# Patient Record
Sex: MALE | Race: WHITE | Employment: OTHER | URBAN - NONMETROPOLITAN AREA
[De-identification: names, ages, dates, MRNs, and addresses within clinical notes are randomized per-mention and may not be internally consistent; named-entity substitution may affect disease eponyms.]

---

## 2017-01-11 RX ORDER — OXYCODONE HYDROCHLORIDE 30 MG/1
30 TABLET, FILM COATED, EXTENDED RELEASE ORAL EVERY 12 HOURS
Qty: 60 EACH | Refills: 0 | Status: SHIPPED | OUTPATIENT
Start: 2017-01-16 | End: 2017-02-07 | Stop reason: SDUPTHER

## 2017-01-11 RX ORDER — HYDROCODONE BITARTRATE AND ACETAMINOPHEN 10; 325 MG/1; MG/1
1 TABLET ORAL EVERY 6 HOURS PRN
Qty: 120 TABLET | Refills: 0 | Status: SHIPPED | OUTPATIENT
Start: 2017-01-16 | End: 2017-02-07 | Stop reason: SDUPTHER

## 2017-02-07 RX ORDER — HYDROCODONE BITARTRATE AND ACETAMINOPHEN 10; 325 MG/1; MG/1
1 TABLET ORAL EVERY 6 HOURS PRN
Qty: 120 TABLET | Refills: 0 | Status: SHIPPED | OUTPATIENT
Start: 2017-02-07 | End: 2017-03-14 | Stop reason: SDUPTHER

## 2017-02-07 RX ORDER — OXYCODONE HYDROCHLORIDE 30 MG/1
30 TABLET, FILM COATED, EXTENDED RELEASE ORAL EVERY 12 HOURS
Qty: 60 EACH | Refills: 0 | Status: SHIPPED | OUTPATIENT
Start: 2017-02-07 | End: 2017-03-14 | Stop reason: SDUPTHER

## 2017-03-14 ENCOUNTER — OFFICE VISIT (OUTPATIENT)
Dept: PAIN MANAGEMENT | Age: 62
End: 2017-03-14
Payer: MEDICARE

## 2017-03-14 ENCOUNTER — HOSPITAL ENCOUNTER (OUTPATIENT)
Age: 62
Setting detail: SPECIMEN
Discharge: HOME | End: 2017-03-14

## 2017-03-14 VITALS
DIASTOLIC BLOOD PRESSURE: 86 MMHG | WEIGHT: 259.2 LBS | BODY MASS INDEX: 35.11 KG/M2 | HEIGHT: 72 IN | RESPIRATION RATE: 20 BRPM | HEART RATE: 96 BPM | SYSTOLIC BLOOD PRESSURE: 136 MMHG

## 2017-03-14 DIAGNOSIS — Z79.891 ENCOUNTER FOR LONG-TERM OPIATE ANALGESIC USE: ICD-10-CM

## 2017-03-14 DIAGNOSIS — G89.29 CHRONIC CERVICAL PAIN: ICD-10-CM

## 2017-03-14 DIAGNOSIS — M17.12 PRIMARY OSTEOARTHRITIS OF LEFT KNEE: Primary | ICD-10-CM

## 2017-03-14 DIAGNOSIS — Z02.83 ENCOUNTER FOR DRUG SCREENING: ICD-10-CM

## 2017-03-14 DIAGNOSIS — M54.2 CHRONIC CERVICAL PAIN: ICD-10-CM

## 2017-03-14 PROCEDURE — 4004F PT TOBACCO SCREEN RCVD TLK: CPT | Performed by: NURSE PRACTITIONER

## 2017-03-14 PROCEDURE — 3017F COLORECTAL CA SCREEN DOC REV: CPT | Performed by: NURSE PRACTITIONER

## 2017-03-14 PROCEDURE — G8484 FLU IMMUNIZE NO ADMIN: HCPCS | Performed by: NURSE PRACTITIONER

## 2017-03-14 PROCEDURE — G8427 DOCREV CUR MEDS BY ELIG CLIN: HCPCS | Performed by: NURSE PRACTITIONER

## 2017-03-14 PROCEDURE — G8417 CALC BMI ABV UP PARAM F/U: HCPCS | Performed by: NURSE PRACTITIONER

## 2017-03-14 PROCEDURE — 99214 OFFICE O/P EST MOD 30 MIN: CPT | Performed by: NURSE PRACTITIONER

## 2017-03-14 RX ORDER — HYDROCODONE BITARTRATE AND ACETAMINOPHEN 10; 325 MG/1; MG/1
1 TABLET ORAL EVERY 6 HOURS PRN
Qty: 120 TABLET | Refills: 0 | Status: SHIPPED | OUTPATIENT
Start: 2017-03-14 | End: 2017-04-07 | Stop reason: SDUPTHER

## 2017-03-14 RX ORDER — OXYCODONE HYDROCHLORIDE 30 MG/1
30 TABLET, FILM COATED, EXTENDED RELEASE ORAL EVERY 12 HOURS
Qty: 60 EACH | Refills: 0 | Status: SHIPPED | OUTPATIENT
Start: 2017-03-14 | End: 2017-04-07 | Stop reason: SDUPTHER

## 2017-03-14 ASSESSMENT — ENCOUNTER SYMPTOMS: BACK PAIN: 1

## 2017-03-18 LAB
6-ACETYLMORPHINE, UR: NOT DETECTED
7-AMINOCLONAZEPAM, URINE: NOT DETECTED
ALPHA-OH-ALPRAZ, URINE: NOT DETECTED
ALPRAZOLAM, URINE: NOT DETECTED
AMPHETAMINES, URINE: NOT DETECTED
BARBITURATES, URINE: NOT DETECTED
BENZOYLECGONINE, UR: NOT DETECTED
BUPRENORPHINE URINE: NOT DETECTED
CARISOPRODOL, UR: NOT DETECTED
CLONAZEPAM, URINE: NOT DETECTED
CODEINE, URINE: NOT DETECTED
CREATININE URINE: 205.4 MG/DL (ref 20–400)
DIAZEPAM, URINE: NOT DETECTED
EER PAIN MGT DRUG PANEL, HIGH RES/EMIT U: NORMAL
ETHYL GLUCURONIDE UR: NOT DETECTED
FENTANYL URINE: NOT DETECTED
HYDROCODONE, URINE: PRESENT
HYDROMORPHONE, URINE: PRESENT
LORAZEPAM, URINE: NOT DETECTED
MARIJUANA METAB, UR: NOT DETECTED
MDA, UR: NOT DETECTED
MDEA, EVE, UR: NOT DETECTED
MDMA URINE: NOT DETECTED
MEPERIDINE METAB, UR: NOT DETECTED
METHADONE, URINE: NOT DETECTED
METHAMPHETAMINE, URINE: NOT DETECTED
METHYLPHENIDATE: NOT DETECTED
MIDAZOLAM, URINE: NOT DETECTED
MORPHINE URINE: NOT DETECTED
NORBUPRENORPHINE, URINE: NOT DETECTED
NORDIAZEPAM, URINE: NOT DETECTED
NORFENTANYL, URINE: NOT DETECTED
NORHYDROCODONE, URINE: PRESENT
NOROXYCODONE, URINE: PRESENT
NOROXYMORPHONE, URINE: NOT DETECTED
OXAZEPAM, URINE: NOT DETECTED
OXYCODONE URINE: PRESENT
OXYMORPHONE, URINE: NOT DETECTED
PAIN MGT DRUG PANEL, HI RES, UR: NORMAL
PCP,URINE: NOT DETECTED
PHENTERMINE, UR: NOT DETECTED
PROPOXYPHENE, URINE: NOT DETECTED
TAPENTADOL, URINE: NOT DETECTED
TAPENTADOL-O-SULFATE, URINE: NOT DETECTED
TEMAZEPAM, URINE: NOT DETECTED
TRAMADOL, URINE: NOT DETECTED
ZOLPIDEM, URINE: NOT DETECTED

## 2017-04-10 RX ORDER — HYDROCODONE BITARTRATE AND ACETAMINOPHEN 10; 325 MG/1; MG/1
1 TABLET ORAL EVERY 6 HOURS PRN
Qty: 120 TABLET | Refills: 0 | Status: SHIPPED | OUTPATIENT
Start: 2017-04-13 | End: 2017-04-11 | Stop reason: SDUPTHER

## 2017-04-10 RX ORDER — OXYCODONE HYDROCHLORIDE 30 MG/1
30 TABLET, FILM COATED, EXTENDED RELEASE ORAL EVERY 12 HOURS
Qty: 60 EACH | Refills: 0 | Status: SHIPPED | OUTPATIENT
Start: 2017-04-13 | End: 2017-04-11 | Stop reason: SDUPTHER

## 2017-04-11 RX ORDER — OXYCODONE HYDROCHLORIDE 30 MG/1
30 TABLET, FILM COATED, EXTENDED RELEASE ORAL EVERY 12 HOURS
Qty: 60 EACH | Refills: 0 | Status: SHIPPED | OUTPATIENT
Start: 2017-04-13 | End: 2017-05-08 | Stop reason: SDUPTHER

## 2017-04-11 RX ORDER — HYDROCODONE BITARTRATE AND ACETAMINOPHEN 10; 325 MG/1; MG/1
1 TABLET ORAL EVERY 6 HOURS PRN
Qty: 120 TABLET | Refills: 0 | Status: SHIPPED | OUTPATIENT
Start: 2017-04-13 | End: 2017-05-08 | Stop reason: SDUPTHER

## 2017-04-14 ENCOUNTER — TELEPHONE (OUTPATIENT)
Dept: PAIN MANAGEMENT | Age: 62
End: 2017-04-14

## 2017-05-08 DIAGNOSIS — M54.2 CHRONIC CERVICAL PAIN: ICD-10-CM

## 2017-05-08 DIAGNOSIS — G89.29 CHRONIC BILATERAL LOW BACK PAIN WITH LEFT-SIDED SCIATICA: Primary | ICD-10-CM

## 2017-05-08 DIAGNOSIS — M54.42 CHRONIC BILATERAL LOW BACK PAIN WITH LEFT-SIDED SCIATICA: Primary | ICD-10-CM

## 2017-05-08 DIAGNOSIS — M17.12 PRIMARY OSTEOARTHRITIS OF LEFT KNEE: ICD-10-CM

## 2017-05-08 DIAGNOSIS — G89.29 CHRONIC CERVICAL PAIN: ICD-10-CM

## 2017-05-09 RX ORDER — OXYCODONE HYDROCHLORIDE 30 MG/1
30 TABLET, FILM COATED, EXTENDED RELEASE ORAL EVERY 12 HOURS
Qty: 60 EACH | Refills: 0 | Status: SHIPPED | OUTPATIENT
Start: 2017-05-19 | End: 2017-06-15 | Stop reason: SDUPTHER

## 2017-05-09 RX ORDER — HYDROCODONE BITARTRATE AND ACETAMINOPHEN 10; 325 MG/1; MG/1
1 TABLET ORAL EVERY 6 HOURS PRN
Qty: 120 TABLET | Refills: 0 | Status: SHIPPED | OUTPATIENT
Start: 2017-05-13 | End: 2017-06-15 | Stop reason: SDUPTHER

## 2017-06-15 ENCOUNTER — OFFICE VISIT (OUTPATIENT)
Dept: PAIN MANAGEMENT | Age: 62
End: 2017-06-15
Payer: MEDICARE

## 2017-06-15 ENCOUNTER — HOSPITAL ENCOUNTER (OUTPATIENT)
Age: 62
Setting detail: SPECIMEN
Discharge: HOME | End: 2017-06-15
Payer: MEDICARE

## 2017-06-15 VITALS
DIASTOLIC BLOOD PRESSURE: 84 MMHG | BODY MASS INDEX: 31.75 KG/M2 | SYSTOLIC BLOOD PRESSURE: 142 MMHG | RESPIRATION RATE: 14 BRPM | HEIGHT: 72 IN | HEART RATE: 80 BPM | WEIGHT: 234.4 LBS

## 2017-06-15 DIAGNOSIS — G89.29 CHRONIC BILATERAL LOW BACK PAIN WITH LEFT-SIDED SCIATICA: ICD-10-CM

## 2017-06-15 DIAGNOSIS — M17.12 PRIMARY OSTEOARTHRITIS OF LEFT KNEE: ICD-10-CM

## 2017-06-15 DIAGNOSIS — G89.29 CHRONIC CERVICAL PAIN: ICD-10-CM

## 2017-06-15 DIAGNOSIS — Z02.83 ENCOUNTER FOR DRUG SCREENING: ICD-10-CM

## 2017-06-15 DIAGNOSIS — M54.2 CHRONIC CERVICAL PAIN: ICD-10-CM

## 2017-06-15 DIAGNOSIS — Z79.891 ENCOUNTER FOR LONG-TERM OPIATE ANALGESIC USE: ICD-10-CM

## 2017-06-15 DIAGNOSIS — Z79.891 OPIATE ANALGESIC USE AGREEMENT EXISTS: Primary | ICD-10-CM

## 2017-06-15 DIAGNOSIS — M54.42 CHRONIC BILATERAL LOW BACK PAIN WITH LEFT-SIDED SCIATICA: ICD-10-CM

## 2017-06-15 PROCEDURE — G8417 CALC BMI ABV UP PARAM F/U: HCPCS | Performed by: NURSE PRACTITIONER

## 2017-06-15 PROCEDURE — G8427 DOCREV CUR MEDS BY ELIG CLIN: HCPCS | Performed by: NURSE PRACTITIONER

## 2017-06-15 PROCEDURE — 4004F PT TOBACCO SCREEN RCVD TLK: CPT | Performed by: NURSE PRACTITIONER

## 2017-06-15 PROCEDURE — 99214 OFFICE O/P EST MOD 30 MIN: CPT | Performed by: NURSE PRACTITIONER

## 2017-06-15 PROCEDURE — 3017F COLORECTAL CA SCREEN DOC REV: CPT | Performed by: NURSE PRACTITIONER

## 2017-06-15 RX ORDER — OXYCODONE HYDROCHLORIDE 30 MG/1
30 TABLET, FILM COATED, EXTENDED RELEASE ORAL EVERY 12 HOURS
Qty: 60 EACH | Refills: 0 | Status: SHIPPED | OUTPATIENT
Start: 2017-06-15 | End: 2017-07-06 | Stop reason: SDUPTHER

## 2017-06-15 RX ORDER — HYDROCODONE BITARTRATE AND ACETAMINOPHEN 10; 325 MG/1; MG/1
1 TABLET ORAL EVERY 6 HOURS PRN
Qty: 120 TABLET | Refills: 0 | Status: SHIPPED | OUTPATIENT
Start: 2017-06-15 | End: 2017-07-06 | Stop reason: SDUPTHER

## 2017-06-15 ASSESSMENT — ENCOUNTER SYMPTOMS: BACK PAIN: 1

## 2017-06-18 LAB
6-ACETYLMORPHINE, UR: NOT DETECTED
7-AMINOCLONAZEPAM, URINE: NOT DETECTED
ALPHA-OH-ALPRAZ, URINE: NOT DETECTED
ALPRAZOLAM, URINE: NOT DETECTED
AMPHETAMINES, URINE: NOT DETECTED
BARBITURATES, URINE: NOT DETECTED
BENZOYLECGONINE, UR: NOT DETECTED
BUPRENORPHINE URINE: NOT DETECTED
CARISOPRODOL, UR: NOT DETECTED
CLONAZEPAM, URINE: NOT DETECTED
CODEINE, URINE: NOT DETECTED
CREATININE URINE: 42.7 MG/DL (ref 20–400)
DIAZEPAM, URINE: NOT DETECTED
EER PAIN MGT DRUG PANEL, HIGH RES/EMIT U: NORMAL
ETHYL GLUCURONIDE UR: NOT DETECTED
FENTANYL URINE: NOT DETECTED
HYDROCODONE, URINE: PRESENT
HYDROMORPHONE, URINE: NOT DETECTED
LORAZEPAM, URINE: NOT DETECTED
MARIJUANA METAB, UR: NOT DETECTED
MDA, UR: NOT DETECTED
MDEA, EVE, UR: NOT DETECTED
MDMA URINE: NOT DETECTED
MEPERIDINE METAB, UR: NOT DETECTED
METHADONE, URINE: NOT DETECTED
METHAMPHETAMINE, URINE: NOT DETECTED
METHYLPHENIDATE: NOT DETECTED
MIDAZOLAM, URINE: NOT DETECTED
MORPHINE URINE: NOT DETECTED
NORBUPRENORPHINE, URINE: NOT DETECTED
NORDIAZEPAM, URINE: NOT DETECTED
NORFENTANYL, URINE: NOT DETECTED
NORHYDROCODONE, URINE: PRESENT
NOROXYCODONE, URINE: PRESENT
NOROXYMORPHONE, URINE: NOT DETECTED
OXAZEPAM, URINE: NOT DETECTED
OXYCODONE URINE: PRESENT
OXYMORPHONE, URINE: NOT DETECTED
PAIN MGT DRUG PANEL, HI RES, UR: NORMAL
PCP,URINE: NOT DETECTED
PHENTERMINE, UR: NOT DETECTED
PROPOXYPHENE, URINE: NOT DETECTED
TAPENTADOL, URINE: NOT DETECTED
TAPENTADOL-O-SULFATE, URINE: NOT DETECTED
TEMAZEPAM, URINE: NOT DETECTED
TRAMADOL, URINE: NOT DETECTED
ZOLPIDEM, URINE: NOT DETECTED

## 2017-07-06 DIAGNOSIS — M54.2 CHRONIC CERVICAL PAIN: ICD-10-CM

## 2017-07-06 DIAGNOSIS — M54.42 CHRONIC BILATERAL LOW BACK PAIN WITH LEFT-SIDED SCIATICA: ICD-10-CM

## 2017-07-06 DIAGNOSIS — G89.29 CHRONIC BILATERAL LOW BACK PAIN WITH LEFT-SIDED SCIATICA: ICD-10-CM

## 2017-07-06 DIAGNOSIS — G89.29 CHRONIC CERVICAL PAIN: ICD-10-CM

## 2017-07-06 DIAGNOSIS — M17.12 PRIMARY OSTEOARTHRITIS OF LEFT KNEE: ICD-10-CM

## 2017-07-07 RX ORDER — HYDROCODONE BITARTRATE AND ACETAMINOPHEN 10; 325 MG/1; MG/1
1 TABLET ORAL EVERY 6 HOURS PRN
Qty: 120 TABLET | Refills: 0 | Status: SHIPPED | OUTPATIENT
Start: 2017-07-15 | End: 2017-08-07 | Stop reason: SDUPTHER

## 2017-07-07 RX ORDER — OXYCODONE HYDROCHLORIDE 30 MG/1
30 TABLET, FILM COATED, EXTENDED RELEASE ORAL EVERY 12 HOURS
Qty: 60 EACH | Refills: 0 | Status: SHIPPED | OUTPATIENT
Start: 2017-07-15 | End: 2017-08-07 | Stop reason: SDUPTHER

## 2017-08-07 DIAGNOSIS — M54.42 CHRONIC BILATERAL LOW BACK PAIN WITH LEFT-SIDED SCIATICA: ICD-10-CM

## 2017-08-07 DIAGNOSIS — M54.2 CHRONIC CERVICAL PAIN: ICD-10-CM

## 2017-08-07 DIAGNOSIS — G89.29 CHRONIC CERVICAL PAIN: ICD-10-CM

## 2017-08-07 DIAGNOSIS — G89.29 CHRONIC BILATERAL LOW BACK PAIN WITH LEFT-SIDED SCIATICA: ICD-10-CM

## 2017-08-07 DIAGNOSIS — M17.12 PRIMARY OSTEOARTHRITIS OF LEFT KNEE: ICD-10-CM

## 2017-08-09 RX ORDER — HYDROCODONE BITARTRATE AND ACETAMINOPHEN 10; 325 MG/1; MG/1
1 TABLET ORAL EVERY 6 HOURS PRN
Qty: 120 TABLET | Refills: 0 | Status: SHIPPED | OUTPATIENT
Start: 2017-08-14 | End: 2017-09-06 | Stop reason: SDUPTHER

## 2017-08-09 RX ORDER — OXYCODONE HYDROCHLORIDE 30 MG/1
30 TABLET, FILM COATED, EXTENDED RELEASE ORAL EVERY 12 HOURS
Qty: 60 EACH | Refills: 0 | Status: SHIPPED | OUTPATIENT
Start: 2017-08-14 | End: 2017-09-06 | Stop reason: SDUPTHER

## 2017-09-06 DIAGNOSIS — G89.29 CHRONIC CERVICAL PAIN: ICD-10-CM

## 2017-09-06 DIAGNOSIS — M54.42 CHRONIC BILATERAL LOW BACK PAIN WITH LEFT-SIDED SCIATICA: ICD-10-CM

## 2017-09-06 DIAGNOSIS — M17.12 PRIMARY OSTEOARTHRITIS OF LEFT KNEE: ICD-10-CM

## 2017-09-06 DIAGNOSIS — G89.29 CHRONIC BILATERAL LOW BACK PAIN WITH LEFT-SIDED SCIATICA: ICD-10-CM

## 2017-09-06 DIAGNOSIS — M54.2 CHRONIC CERVICAL PAIN: ICD-10-CM

## 2017-09-07 RX ORDER — HYDROCODONE BITARTRATE AND ACETAMINOPHEN 10; 325 MG/1; MG/1
1 TABLET ORAL EVERY 6 HOURS PRN
Qty: 120 TABLET | Refills: 0 | Status: SHIPPED | OUTPATIENT
Start: 2017-09-13 | End: 2017-10-13 | Stop reason: SDUPTHER

## 2017-09-07 RX ORDER — OXYCODONE HYDROCHLORIDE 30 MG/1
30 TABLET, FILM COATED, EXTENDED RELEASE ORAL EVERY 12 HOURS
Qty: 60 EACH | Refills: 0 | Status: SHIPPED | OUTPATIENT
Start: 2017-09-13 | End: 2017-10-13 | Stop reason: SDUPTHER

## 2017-10-13 ENCOUNTER — OFFICE VISIT (OUTPATIENT)
Dept: PAIN MANAGEMENT | Age: 62
End: 2017-10-13
Payer: MEDICARE

## 2017-10-13 ENCOUNTER — HOSPITAL ENCOUNTER (OUTPATIENT)
Age: 62
Setting detail: SPECIMEN
Discharge: HOME | End: 2017-10-13
Payer: MEDICARE

## 2017-10-13 VITALS
HEART RATE: 80 BPM | DIASTOLIC BLOOD PRESSURE: 78 MMHG | WEIGHT: 232 LBS | RESPIRATION RATE: 16 BRPM | HEIGHT: 72 IN | BODY MASS INDEX: 31.42 KG/M2 | SYSTOLIC BLOOD PRESSURE: 108 MMHG

## 2017-10-13 DIAGNOSIS — M54.42 CHRONIC BILATERAL LOW BACK PAIN WITH LEFT-SIDED SCIATICA: Primary | ICD-10-CM

## 2017-10-13 DIAGNOSIS — Z02.83 ENCOUNTER FOR DRUG SCREENING: ICD-10-CM

## 2017-10-13 DIAGNOSIS — M54.2 CHRONIC CERVICAL PAIN: ICD-10-CM

## 2017-10-13 DIAGNOSIS — G89.29 CHRONIC CERVICAL PAIN: ICD-10-CM

## 2017-10-13 DIAGNOSIS — M17.12 PRIMARY OSTEOARTHRITIS OF LEFT KNEE: ICD-10-CM

## 2017-10-13 DIAGNOSIS — Z79.891 ENCOUNTER FOR LONG-TERM OPIATE ANALGESIC USE: ICD-10-CM

## 2017-10-13 DIAGNOSIS — G89.29 CHRONIC BILATERAL LOW BACK PAIN WITH LEFT-SIDED SCIATICA: Primary | ICD-10-CM

## 2017-10-13 PROCEDURE — G8417 CALC BMI ABV UP PARAM F/U: HCPCS | Performed by: NURSE PRACTITIONER

## 2017-10-13 PROCEDURE — G8427 DOCREV CUR MEDS BY ELIG CLIN: HCPCS | Performed by: NURSE PRACTITIONER

## 2017-10-13 PROCEDURE — 80307 DRUG TEST PRSMV CHEM ANLYZR: CPT

## 2017-10-13 PROCEDURE — 3017F COLORECTAL CA SCREEN DOC REV: CPT | Performed by: NURSE PRACTITIONER

## 2017-10-13 PROCEDURE — G8484 FLU IMMUNIZE NO ADMIN: HCPCS | Performed by: NURSE PRACTITIONER

## 2017-10-13 PROCEDURE — 4004F PT TOBACCO SCREEN RCVD TLK: CPT | Performed by: NURSE PRACTITIONER

## 2017-10-13 PROCEDURE — 99214 OFFICE O/P EST MOD 30 MIN: CPT | Performed by: NURSE PRACTITIONER

## 2017-10-13 RX ORDER — HYDROCODONE BITARTRATE AND ACETAMINOPHEN 10; 325 MG/1; MG/1
1 TABLET ORAL EVERY 6 HOURS PRN
Qty: 120 TABLET | Refills: 0 | Status: SHIPPED | OUTPATIENT
Start: 2017-10-13 | End: 2017-11-06 | Stop reason: SDUPTHER

## 2017-10-13 RX ORDER — OXYCODONE HYDROCHLORIDE 30 MG/1
30 TABLET, FILM COATED, EXTENDED RELEASE ORAL EVERY 12 HOURS
Qty: 60 EACH | Refills: 0 | Status: SHIPPED | OUTPATIENT
Start: 2017-10-13 | End: 2017-11-06 | Stop reason: SDUPTHER

## 2017-10-13 ASSESSMENT — ENCOUNTER SYMPTOMS: BACK PAIN: 1

## 2017-10-13 NOTE — PROGRESS NOTES
Controlled Substances Monitoring: Attestation: The Prescription Monitoring Report for this patient was reviewed today. Brendon Harris NP)  Documentation: No signs of potential drug abuse or diversion identified., Existing medication contract. , Random urine drug screen sent today.  Brendon Harris NP)  Follow up three months

## 2017-10-15 LAB
6-ACETYLMORPHINE, UR: NOT DETECTED
7-AMINOCLONAZEPAM, URINE: NOT DETECTED
ALPHA-OH-ALPRAZ, URINE: NOT DETECTED
ALPRAZOLAM, URINE: NOT DETECTED
AMPHETAMINES, URINE: NOT DETECTED
BARBITURATES, URINE: NOT DETECTED
BENZOYLECGONINE, UR: NOT DETECTED
BUPRENORPHINE URINE: NOT DETECTED
CARISOPRODOL, UR: NOT DETECTED
CLONAZEPAM, URINE: NOT DETECTED
CODEINE, URINE: NOT DETECTED
CREATININE URINE: 81.6 MG/DL (ref 20–400)
DIAZEPAM, URINE: NOT DETECTED
EER PAIN MGT DRUG PANEL, HIGH RES/EMIT U: NORMAL
ETHYL GLUCURONIDE UR: NOT DETECTED
FENTANYL URINE: NOT DETECTED
HYDROCODONE, URINE: PRESENT
HYDROMORPHONE, URINE: NOT DETECTED
LORAZEPAM, URINE: NOT DETECTED
MARIJUANA METAB, UR: NOT DETECTED
MDA, UR: NOT DETECTED
MDEA, EVE, UR: NOT DETECTED
MDMA URINE: NOT DETECTED
MEPERIDINE METAB, UR: NOT DETECTED
METHADONE, URINE: NOT DETECTED
METHAMPHETAMINE, URINE: NOT DETECTED
METHYLPHENIDATE: NOT DETECTED
MIDAZOLAM, URINE: NOT DETECTED
MORPHINE URINE: NOT DETECTED
NORBUPRENORPHINE, URINE: NOT DETECTED
NORDIAZEPAM, URINE: NOT DETECTED
NORFENTANYL, URINE: NOT DETECTED
NORHYDROCODONE, URINE: PRESENT
NOROXYCODONE, URINE: PRESENT
NOROXYMORPHONE, URINE: NOT DETECTED
OXAZEPAM, URINE: NOT DETECTED
OXYCODONE URINE: PRESENT
OXYMORPHONE, URINE: NOT DETECTED
PAIN MGT DRUG PANEL, HI RES, UR: NORMAL
PCP,URINE: NOT DETECTED
PHENTERMINE, UR: NOT DETECTED
PROPOXYPHENE, URINE: NOT DETECTED
TAPENTADOL, URINE: NOT DETECTED
TAPENTADOL-O-SULFATE, URINE: NOT DETECTED
TEMAZEPAM, URINE: NOT DETECTED
TRAMADOL, URINE: NOT DETECTED
ZOLPIDEM, URINE: NOT DETECTED

## 2017-11-06 DIAGNOSIS — G89.29 CHRONIC BILATERAL LOW BACK PAIN WITH LEFT-SIDED SCIATICA: ICD-10-CM

## 2017-11-06 DIAGNOSIS — M54.2 CHRONIC CERVICAL PAIN: ICD-10-CM

## 2017-11-06 DIAGNOSIS — M54.42 CHRONIC BILATERAL LOW BACK PAIN WITH LEFT-SIDED SCIATICA: ICD-10-CM

## 2017-11-06 DIAGNOSIS — G89.29 CHRONIC CERVICAL PAIN: ICD-10-CM

## 2017-11-06 DIAGNOSIS — M17.12 PRIMARY OSTEOARTHRITIS OF LEFT KNEE: ICD-10-CM

## 2017-11-06 NOTE — TELEPHONE ENCOUNTER
Pt called refill line for Norco, and Oxycontin. Last Appt:  10/13/2017  Next Appt:   12/13/2017  Med verified in [address] [place] [address] checked for [place], [place], and [place]: 10/13/2017 Norco #120. Due 11/12/2017.          10/13/2017 Oxycontin #60. Due 11/12/2017.

## 2017-11-07 RX ORDER — HYDROCODONE BITARTRATE AND ACETAMINOPHEN 10; 325 MG/1; MG/1
1 TABLET ORAL EVERY 6 HOURS PRN
Qty: 120 TABLET | Refills: 0 | Status: SHIPPED | OUTPATIENT
Start: 2017-11-12 | End: 2017-11-07 | Stop reason: SDUPTHER

## 2017-11-07 RX ORDER — OXYCODONE HYDROCHLORIDE 30 MG/1
30 TABLET, FILM COATED, EXTENDED RELEASE ORAL EVERY 12 HOURS
Qty: 60 EACH | Refills: 0 | Status: SHIPPED | OUTPATIENT
Start: 2017-11-12 | End: 2017-11-07 | Stop reason: SDUPTHER

## 2017-11-08 RX ORDER — HYDROCODONE BITARTRATE AND ACETAMINOPHEN 10; 325 MG/1; MG/1
1 TABLET ORAL EVERY 6 HOURS PRN
Qty: 120 TABLET | Refills: 0 | Status: SHIPPED | OUTPATIENT
Start: 2017-11-11 | End: 2017-12-06 | Stop reason: SDUPTHER

## 2017-11-08 RX ORDER — OXYCODONE HYDROCHLORIDE 30 MG/1
30 TABLET, FILM COATED, EXTENDED RELEASE ORAL EVERY 12 HOURS
Qty: 60 EACH | Refills: 0 | Status: SHIPPED | OUTPATIENT
Start: 2017-11-11 | End: 2017-12-06 | Stop reason: SDUPTHER

## 2017-12-06 DIAGNOSIS — M54.2 CHRONIC CERVICAL PAIN: ICD-10-CM

## 2017-12-06 DIAGNOSIS — M54.42 CHRONIC BILATERAL LOW BACK PAIN WITH LEFT-SIDED SCIATICA: ICD-10-CM

## 2017-12-06 DIAGNOSIS — G89.29 CHRONIC CERVICAL PAIN: ICD-10-CM

## 2017-12-06 DIAGNOSIS — G89.29 CHRONIC BILATERAL LOW BACK PAIN WITH LEFT-SIDED SCIATICA: ICD-10-CM

## 2017-12-06 DIAGNOSIS — M17.12 PRIMARY OSTEOARTHRITIS OF LEFT KNEE: ICD-10-CM

## 2017-12-06 NOTE — TELEPHONE ENCOUNTER
Pt called refill line requesting a refill of norco and oxycontin.    Last Appt:  10/13/2017  Next Appt:   12/13/2017  Med verified in Epic

## 2017-12-06 NOTE — TELEPHONE ENCOUNTER
OAR[place] Report checked for [place] and [place]: 11/11/2017 Oxycontin #60. Due 12/11/2017.          11/11/2017 Norco #120. Due 12/11/2017.

## 2017-12-08 DIAGNOSIS — M54.42 CHRONIC BILATERAL LOW BACK PAIN WITH LEFT-SIDED SCIATICA: ICD-10-CM

## 2017-12-08 DIAGNOSIS — M17.12 PRIMARY OSTEOARTHRITIS OF LEFT KNEE: ICD-10-CM

## 2017-12-08 DIAGNOSIS — G89.29 CHRONIC BILATERAL LOW BACK PAIN WITH LEFT-SIDED SCIATICA: ICD-10-CM

## 2017-12-08 DIAGNOSIS — M54.2 CHRONIC CERVICAL PAIN: ICD-10-CM

## 2017-12-08 DIAGNOSIS — G89.29 CHRONIC CERVICAL PAIN: ICD-10-CM

## 2017-12-08 RX ORDER — HYDROCODONE BITARTRATE AND ACETAMINOPHEN 10; 325 MG/1; MG/1
1 TABLET ORAL EVERY 6 HOURS PRN
Qty: 120 TABLET | Refills: 0 | Status: SHIPPED | OUTPATIENT
Start: 2017-12-11 | End: 2017-12-08 | Stop reason: SDUPTHER

## 2017-12-08 RX ORDER — OXYCODONE HYDROCHLORIDE 30 MG/1
30 TABLET, FILM COATED, EXTENDED RELEASE ORAL EVERY 12 HOURS
Qty: 60 EACH | Refills: 0 | Status: SHIPPED | OUTPATIENT
Start: 2017-12-11 | End: 2017-12-08 | Stop reason: SDUPTHER

## 2017-12-08 NOTE — TELEPHONE ENCOUNTER
Pt called refill line for [place] , to be sent to [facility]. Last Appt:  10/13/2017  Next Appt:   12/13/2017  Med verified in [address] checked for [place] and [place]: 11/11/2017 Oxycontin #60. Due 12/11/2017. .          11/11/2017 Norco #120. Due 12/11/2017.

## 2017-12-11 RX ORDER — OXYCODONE HYDROCHLORIDE 30 MG/1
30 TABLET, FILM COATED, EXTENDED RELEASE ORAL EVERY 12 HOURS
Qty: 60 EACH | Refills: 0 | Status: SHIPPED | OUTPATIENT
Start: 2017-12-11 | End: 2018-01-02 | Stop reason: SDUPTHER

## 2017-12-11 RX ORDER — HYDROCODONE BITARTRATE AND ACETAMINOPHEN 10; 325 MG/1; MG/1
1 TABLET ORAL EVERY 6 HOURS PRN
Qty: 120 TABLET | Refills: 0 | Status: SHIPPED | OUTPATIENT
Start: 2017-12-11 | End: 2018-01-02 | Stop reason: SDUPTHER

## 2017-12-13 ENCOUNTER — OFFICE VISIT (OUTPATIENT)
Dept: PAIN MANAGEMENT | Age: 62
End: 2017-12-13
Payer: MEDICARE

## 2017-12-13 VITALS
DIASTOLIC BLOOD PRESSURE: 82 MMHG | RESPIRATION RATE: 16 BRPM | BODY MASS INDEX: 32.51 KG/M2 | HEART RATE: 72 BPM | WEIGHT: 240 LBS | SYSTOLIC BLOOD PRESSURE: 144 MMHG | HEIGHT: 72 IN

## 2017-12-13 DIAGNOSIS — G89.29 CHRONIC CERVICAL PAIN: Primary | ICD-10-CM

## 2017-12-13 DIAGNOSIS — Z79.891 OPIATE ANALGESIC USE AGREEMENT EXISTS: ICD-10-CM

## 2017-12-13 DIAGNOSIS — M54.2 CHRONIC CERVICAL PAIN: Primary | ICD-10-CM

## 2017-12-13 DIAGNOSIS — G89.29 ENCOUNTER FOR CHRONIC PAIN MANAGEMENT: ICD-10-CM

## 2017-12-13 DIAGNOSIS — M17.12 PRIMARY OSTEOARTHRITIS OF LEFT KNEE: ICD-10-CM

## 2017-12-13 PROCEDURE — G8427 DOCREV CUR MEDS BY ELIG CLIN: HCPCS | Performed by: NURSE PRACTITIONER

## 2017-12-13 PROCEDURE — 4004F PT TOBACCO SCREEN RCVD TLK: CPT | Performed by: NURSE PRACTITIONER

## 2017-12-13 PROCEDURE — 3017F COLORECTAL CA SCREEN DOC REV: CPT | Performed by: NURSE PRACTITIONER

## 2017-12-13 PROCEDURE — 99213 OFFICE O/P EST LOW 20 MIN: CPT | Performed by: NURSE PRACTITIONER

## 2017-12-13 PROCEDURE — G8417 CALC BMI ABV UP PARAM F/U: HCPCS | Performed by: NURSE PRACTITIONER

## 2017-12-13 PROCEDURE — G8484 FLU IMMUNIZE NO ADMIN: HCPCS | Performed by: NURSE PRACTITIONER

## 2017-12-13 ASSESSMENT — ENCOUNTER SYMPTOMS: BACK PAIN: 1

## 2017-12-13 NOTE — PROGRESS NOTES
Subjective:      Patient ID: Chalino Sheridan is a 65 y.o. male. Knee Pain    Associated symptoms include numbness. Here today for routine pain clinic recheck. Overall doing well current medications for chronic pain control, maintaining ADL's, remains active. No side effects    Underwent nasal surgery x3, 2 ER visits. Nose still numb, accompanied by headaches. Pain Assessment  Location of Pain: Knee  Location Modifiers: Left  Severity of Pain: 7  Quality of Pain: Throbbing, Sharp, Dull, Aching  Duration of Pain: Persistent  Frequency of Pain: Constant  Aggravating Factors:  (weather)  Limiting Behavior: Yes  Relieving Factors: Ice, Heat (pain med, voltrean gel)  Are there other pain locations you wish to document?: No    Allergies   Allergen Reactions    Penicillins Shortness Of Breath    Adhesive Tape Rash       Outpatient Prescriptions Marked as Taking for the 12/13/17 encounter (Office Visit) with Reny Khan NP   Medication Sig Dispense Refill    HYDROcodone-acetaminophen (NORCO)  MG per tablet Take 1 tablet by mouth every 6 hours as needed for Pain . 120 tablet 0    oxyCODONE (OXYCONTIN) 30 MG T12A extended release tablet Take 30 mg by mouth every 12 hours . 60 each 0    fluticasone-vilanterol (BREO ELLIPTA) 100-25 MCG/INH AEPB Inhale into the lungs 2 puffs daily      DEXILANT 30 MG CPDR Take 1 tablet by mouth nightly   0    levothyroxine (SYNTHROID) 200 MCG tablet Take 1 tablet by mouth every other day      SEROQUEL  MG XR tablet Take 300 mg by mouth nightly   0    dexlansoprazole (DEXILANT) 60 MG CPDR capsule Take 60 mg by mouth every morning       pravastatin (PRAVACHOL) 40 MG tablet Take 40 mg by mouth daily.  albuterol (PROVENTIL HFA;VENTOLIN HFA) 108 (90 BASE) MCG/ACT inhaler Inhale 2 puffs into the lungs every 6 hours as needed for Wheezing.       warfarin (COUMADIN) 7.5 MG tablet Take 7.5 mg by mouth daily       warfarin (COUMADIN) 1 MG tablet Take 5 mg by

## 2018-01-02 DIAGNOSIS — G89.29 CHRONIC CERVICAL PAIN: ICD-10-CM

## 2018-01-02 DIAGNOSIS — M17.12 PRIMARY OSTEOARTHRITIS OF LEFT KNEE: ICD-10-CM

## 2018-01-02 DIAGNOSIS — M54.2 CHRONIC CERVICAL PAIN: ICD-10-CM

## 2018-01-02 DIAGNOSIS — G89.29 CHRONIC BILATERAL LOW BACK PAIN WITH LEFT-SIDED SCIATICA: ICD-10-CM

## 2018-01-02 DIAGNOSIS — M54.42 CHRONIC BILATERAL LOW BACK PAIN WITH LEFT-SIDED SCIATICA: ICD-10-CM

## 2018-01-02 NOTE — TELEPHONE ENCOUNTER
[place]AR[place] Report checked for [place] and [place]: 12/11/17 norco 10-325mg #120. Due 1/10/18.          12/11/17 oxycontin 30mg #60. Due 1/10/18.     Pt called refill line requesting a refill of oxycontin and norco.  Last Appt:  12/13/2017  Next Appt:   3/13/2018  Med verified in Epic

## 2018-01-04 RX ORDER — HYDROCODONE BITARTRATE AND ACETAMINOPHEN 10; 325 MG/1; MG/1
1 TABLET ORAL EVERY 6 HOURS PRN
Qty: 120 TABLET | Refills: 0 | Status: SHIPPED | OUTPATIENT
Start: 2018-01-10 | End: 2018-01-29 | Stop reason: SDUPTHER

## 2018-01-04 RX ORDER — OXYCODONE HYDROCHLORIDE 30 MG/1
30 TABLET, FILM COATED, EXTENDED RELEASE ORAL EVERY 12 HOURS
Qty: 60 EACH | Refills: 0 | Status: SHIPPED | OUTPATIENT
Start: 2018-01-10 | End: 2018-01-29 | Stop reason: SDUPTHER

## 2018-01-29 DIAGNOSIS — G89.29 CHRONIC BILATERAL LOW BACK PAIN WITH LEFT-SIDED SCIATICA: ICD-10-CM

## 2018-01-29 DIAGNOSIS — M54.2 CHRONIC CERVICAL PAIN: ICD-10-CM

## 2018-01-29 DIAGNOSIS — G89.29 CHRONIC CERVICAL PAIN: ICD-10-CM

## 2018-01-29 DIAGNOSIS — M54.42 CHRONIC BILATERAL LOW BACK PAIN WITH LEFT-SIDED SCIATICA: ICD-10-CM

## 2018-01-29 DIAGNOSIS — M17.12 PRIMARY OSTEOARTHRITIS OF LEFT KNEE: ICD-10-CM

## 2018-01-31 RX ORDER — HYDROCODONE BITARTRATE AND ACETAMINOPHEN 10; 325 MG/1; MG/1
1 TABLET ORAL EVERY 6 HOURS PRN
Qty: 120 TABLET | Refills: 0 | Status: SHIPPED | OUTPATIENT
Start: 2018-02-09 | End: 2018-03-06 | Stop reason: SDUPTHER

## 2018-01-31 RX ORDER — OXYCODONE HYDROCHLORIDE 30 MG/1
30 TABLET, FILM COATED, EXTENDED RELEASE ORAL EVERY 12 HOURS
Qty: 60 EACH | Refills: 0 | Status: SHIPPED | OUTPATIENT
Start: 2018-02-09 | End: 2018-03-06 | Stop reason: SDUPTHER

## 2018-03-06 DIAGNOSIS — G89.29 CHRONIC BILATERAL LOW BACK PAIN WITH LEFT-SIDED SCIATICA: ICD-10-CM

## 2018-03-06 DIAGNOSIS — M54.2 CHRONIC CERVICAL PAIN: ICD-10-CM

## 2018-03-06 DIAGNOSIS — G89.29 CHRONIC CERVICAL PAIN: ICD-10-CM

## 2018-03-06 DIAGNOSIS — M17.12 PRIMARY OSTEOARTHRITIS OF LEFT KNEE: ICD-10-CM

## 2018-03-06 DIAGNOSIS — M54.42 CHRONIC BILATERAL LOW BACK PAIN WITH LEFT-SIDED SCIATICA: ICD-10-CM

## 2018-03-08 NOTE — TELEPHONE ENCOUNTER
Patient called refill line requesting that we call him as soon as possible to let him know when the medications would be sent to the pharmacy. Writer called patient and explained the policy and that he was not due until 3/11/2018, patient voices understanding.   Stats that he will not pick medications up until 3.13.2018 anyway

## 2018-03-09 RX ORDER — OXYCODONE HYDROCHLORIDE 30 MG/1
30 TABLET, FILM COATED, EXTENDED RELEASE ORAL EVERY 12 HOURS
Qty: 60 EACH | Refills: 0 | Status: SHIPPED | OUTPATIENT
Start: 2018-03-10 | End: 2018-04-03 | Stop reason: SDUPTHER

## 2018-03-09 RX ORDER — HYDROCODONE BITARTRATE AND ACETAMINOPHEN 10; 325 MG/1; MG/1
1 TABLET ORAL EVERY 6 HOURS PRN
Qty: 120 TABLET | Refills: 0 | Status: SHIPPED | OUTPATIENT
Start: 2018-03-10 | End: 2018-04-03 | Stop reason: SDUPTHER

## 2018-03-12 ENCOUNTER — OFFICE VISIT (OUTPATIENT)
Dept: PAIN MANAGEMENT | Age: 63
End: 2018-03-12
Payer: MEDICARE

## 2018-03-12 ENCOUNTER — HOSPITAL ENCOUNTER (OUTPATIENT)
Age: 63
Setting detail: SPECIMEN
Discharge: HOME | End: 2018-03-12
Payer: MEDICARE

## 2018-03-12 VITALS
DIASTOLIC BLOOD PRESSURE: 82 MMHG | HEIGHT: 72 IN | WEIGHT: 241 LBS | SYSTOLIC BLOOD PRESSURE: 130 MMHG | HEART RATE: 78 BPM | BODY MASS INDEX: 32.64 KG/M2

## 2018-03-12 DIAGNOSIS — Z79.891 ENCOUNTER FOR LONG-TERM OPIATE ANALGESIC USE: ICD-10-CM

## 2018-03-12 DIAGNOSIS — M54.42 CHRONIC BILATERAL LOW BACK PAIN WITH LEFT-SIDED SCIATICA: Primary | ICD-10-CM

## 2018-03-12 DIAGNOSIS — Z02.83 ENCOUNTER FOR DRUG SCREENING: ICD-10-CM

## 2018-03-12 DIAGNOSIS — M17.12 PRIMARY OSTEOARTHRITIS OF LEFT KNEE: ICD-10-CM

## 2018-03-12 DIAGNOSIS — M25.562 CHRONIC PAIN OF LEFT KNEE: ICD-10-CM

## 2018-03-12 DIAGNOSIS — G89.29 CHRONIC BILATERAL LOW BACK PAIN WITH LEFT-SIDED SCIATICA: Primary | ICD-10-CM

## 2018-03-12 DIAGNOSIS — G89.29 CHRONIC PAIN OF LEFT KNEE: ICD-10-CM

## 2018-03-12 PROCEDURE — 80307 DRUG TEST PRSMV CHEM ANLYZR: CPT

## 2018-03-12 PROCEDURE — G8427 DOCREV CUR MEDS BY ELIG CLIN: HCPCS | Performed by: NURSE PRACTITIONER

## 2018-03-12 PROCEDURE — 99214 OFFICE O/P EST MOD 30 MIN: CPT | Performed by: NURSE PRACTITIONER

## 2018-03-12 PROCEDURE — 4004F PT TOBACCO SCREEN RCVD TLK: CPT | Performed by: NURSE PRACTITIONER

## 2018-03-12 PROCEDURE — G8484 FLU IMMUNIZE NO ADMIN: HCPCS | Performed by: NURSE PRACTITIONER

## 2018-03-12 PROCEDURE — G8417 CALC BMI ABV UP PARAM F/U: HCPCS | Performed by: NURSE PRACTITIONER

## 2018-03-12 PROCEDURE — 3017F COLORECTAL CA SCREEN DOC REV: CPT | Performed by: NURSE PRACTITIONER

## 2018-03-12 ASSESSMENT — ENCOUNTER SYMPTOMS: BACK PAIN: 1

## 2018-03-15 LAB
6-ACETYLMORPHINE, UR: NOT DETECTED
7-AMINOCLONAZEPAM, URINE: NOT DETECTED
ALPHA-OH-ALPRAZ, URINE: NOT DETECTED
ALPRAZOLAM, URINE: NOT DETECTED
AMPHETAMINES, URINE: NOT DETECTED
BARBITURATES, URINE: NOT DETECTED
BENZOYLECGONINE, UR: NOT DETECTED
BUPRENORPHINE URINE: NOT DETECTED
CARISOPRODOL, UR: NOT DETECTED
CLONAZEPAM, URINE: NOT DETECTED
CODEINE, URINE: NOT DETECTED
CREATININE URINE: 45.9 MG/DL (ref 20–400)
DIAZEPAM, URINE: NOT DETECTED
EER PAIN MGT DRUG PANEL, HIGH RES/EMIT U: NORMAL
ETHYL GLUCURONIDE UR: NOT DETECTED
FENTANYL URINE: NOT DETECTED
HYDROCODONE, URINE: PRESENT
HYDROMORPHONE, URINE: NOT DETECTED
LORAZEPAM, URINE: NOT DETECTED
MARIJUANA METAB, UR: NOT DETECTED
MDA, UR: NOT DETECTED
MDEA, EVE, UR: NOT DETECTED
MDMA URINE: NOT DETECTED
MEPERIDINE METAB, UR: NOT DETECTED
METHADONE, URINE: NOT DETECTED
METHAMPHETAMINE, URINE: NOT DETECTED
METHYLPHENIDATE: NOT DETECTED
MIDAZOLAM, URINE: NOT DETECTED
MORPHINE URINE: NOT DETECTED
NORBUPRENORPHINE, URINE: NOT DETECTED
NORDIAZEPAM, URINE: NOT DETECTED
NORFENTANYL, URINE: NOT DETECTED
NORHYDROCODONE, URINE: PRESENT
NOROXYCODONE, URINE: PRESENT
NOROXYMORPHONE, URINE: NOT DETECTED
OXAZEPAM, URINE: NOT DETECTED
OXYCODONE URINE: PRESENT
OXYMORPHONE, URINE: NOT DETECTED
PAIN MGT DRUG PANEL, HI RES, UR: NORMAL
PCP,URINE: NOT DETECTED
PHENTERMINE, UR: NOT DETECTED
PROPOXYPHENE, URINE: NOT DETECTED
TAPENTADOL, URINE: NOT DETECTED
TAPENTADOL-O-SULFATE, URINE: NOT DETECTED
TEMAZEPAM, URINE: NOT DETECTED
TRAMADOL, URINE: NOT DETECTED
ZOLPIDEM, URINE: NOT DETECTED

## 2018-04-03 DIAGNOSIS — M17.12 PRIMARY OSTEOARTHRITIS OF LEFT KNEE: ICD-10-CM

## 2018-04-03 DIAGNOSIS — M54.42 CHRONIC BILATERAL LOW BACK PAIN WITH LEFT-SIDED SCIATICA: ICD-10-CM

## 2018-04-03 DIAGNOSIS — G89.29 CHRONIC CERVICAL PAIN: ICD-10-CM

## 2018-04-03 DIAGNOSIS — M54.2 CHRONIC CERVICAL PAIN: ICD-10-CM

## 2018-04-03 DIAGNOSIS — G89.29 CHRONIC BILATERAL LOW BACK PAIN WITH LEFT-SIDED SCIATICA: ICD-10-CM

## 2018-04-05 RX ORDER — OXYCODONE HYDROCHLORIDE 30 MG/1
30 TABLET, FILM COATED, EXTENDED RELEASE ORAL EVERY 12 HOURS
Qty: 60 EACH | Refills: 0 | Status: SHIPPED | OUTPATIENT
Start: 2018-04-09 | End: 2018-05-01 | Stop reason: SDUPTHER

## 2018-04-05 RX ORDER — HYDROCODONE BITARTRATE AND ACETAMINOPHEN 10; 325 MG/1; MG/1
1 TABLET ORAL EVERY 6 HOURS PRN
Qty: 120 TABLET | Refills: 0 | Status: SHIPPED | OUTPATIENT
Start: 2018-04-09 | End: 2018-05-01 | Stop reason: SDUPTHER

## 2018-05-01 DIAGNOSIS — G89.29 CHRONIC CERVICAL PAIN: ICD-10-CM

## 2018-05-01 DIAGNOSIS — M54.2 CHRONIC CERVICAL PAIN: ICD-10-CM

## 2018-05-01 DIAGNOSIS — M17.12 PRIMARY OSTEOARTHRITIS OF LEFT KNEE: ICD-10-CM

## 2018-05-01 DIAGNOSIS — M54.42 CHRONIC BILATERAL LOW BACK PAIN WITH LEFT-SIDED SCIATICA: ICD-10-CM

## 2018-05-01 DIAGNOSIS — G89.29 CHRONIC BILATERAL LOW BACK PAIN WITH LEFT-SIDED SCIATICA: ICD-10-CM

## 2018-05-01 RX ORDER — OXYCODONE HYDROCHLORIDE 30 MG/1
30 TABLET, FILM COATED, EXTENDED RELEASE ORAL EVERY 12 HOURS
Qty: 60 EACH | Refills: 0 | Status: SHIPPED | OUTPATIENT
Start: 2018-05-09 | End: 2018-06-01 | Stop reason: SDUPTHER

## 2018-05-01 RX ORDER — HYDROCODONE BITARTRATE AND ACETAMINOPHEN 10; 325 MG/1; MG/1
1 TABLET ORAL EVERY 6 HOURS PRN
Qty: 120 TABLET | Refills: 0 | Status: SHIPPED | OUTPATIENT
Start: 2018-05-09 | End: 2018-06-01 | Stop reason: SDUPTHER

## 2018-06-01 DIAGNOSIS — G89.29 CHRONIC CERVICAL PAIN: ICD-10-CM

## 2018-06-01 DIAGNOSIS — M54.2 CHRONIC CERVICAL PAIN: ICD-10-CM

## 2018-06-01 DIAGNOSIS — M17.12 PRIMARY OSTEOARTHRITIS OF LEFT KNEE: ICD-10-CM

## 2018-06-01 DIAGNOSIS — G89.29 CHRONIC BILATERAL LOW BACK PAIN WITH LEFT-SIDED SCIATICA: ICD-10-CM

## 2018-06-01 DIAGNOSIS — M54.42 CHRONIC BILATERAL LOW BACK PAIN WITH LEFT-SIDED SCIATICA: ICD-10-CM

## 2018-06-07 RX ORDER — OXYCODONE HYDROCHLORIDE 30 MG/1
30 TABLET, FILM COATED, EXTENDED RELEASE ORAL EVERY 12 HOURS
Qty: 60 EACH | Refills: 0 | Status: SHIPPED | OUTPATIENT
Start: 2018-06-08 | End: 2018-07-05 | Stop reason: SDUPTHER

## 2018-06-07 RX ORDER — HYDROCODONE BITARTRATE AND ACETAMINOPHEN 10; 325 MG/1; MG/1
1 TABLET ORAL EVERY 6 HOURS PRN
Qty: 120 TABLET | Refills: 0 | Status: SHIPPED | OUTPATIENT
Start: 2018-06-08 | End: 2018-07-05 | Stop reason: SDUPTHER

## 2018-06-12 ENCOUNTER — HOSPITAL ENCOUNTER (OUTPATIENT)
Age: 63
Setting detail: SPECIMEN
Discharge: HOME | End: 2018-06-12
Payer: MEDICARE

## 2018-06-12 ENCOUNTER — OFFICE VISIT (OUTPATIENT)
Dept: PAIN MANAGEMENT | Age: 63
End: 2018-06-12
Payer: MEDICARE

## 2018-06-12 VITALS
RESPIRATION RATE: 16 BRPM | HEIGHT: 72 IN | DIASTOLIC BLOOD PRESSURE: 80 MMHG | WEIGHT: 223.6 LBS | HEART RATE: 64 BPM | BODY MASS INDEX: 30.28 KG/M2 | OXYGEN SATURATION: 98 % | SYSTOLIC BLOOD PRESSURE: 142 MMHG

## 2018-06-12 DIAGNOSIS — Z79.891 ENCOUNTER FOR LONG-TERM OPIATE ANALGESIC USE: ICD-10-CM

## 2018-06-12 DIAGNOSIS — Z02.83 ENCOUNTER FOR DRUG SCREENING: ICD-10-CM

## 2018-06-12 DIAGNOSIS — G89.29 CHRONIC BILATERAL LOW BACK PAIN WITH LEFT-SIDED SCIATICA: Primary | ICD-10-CM

## 2018-06-12 DIAGNOSIS — M17.12 PRIMARY OSTEOARTHRITIS OF LEFT KNEE: ICD-10-CM

## 2018-06-12 DIAGNOSIS — G89.29 CHRONIC PAIN OF LEFT KNEE: ICD-10-CM

## 2018-06-12 DIAGNOSIS — M25.562 CHRONIC PAIN OF LEFT KNEE: ICD-10-CM

## 2018-06-12 DIAGNOSIS — M54.42 CHRONIC BILATERAL LOW BACK PAIN WITH LEFT-SIDED SCIATICA: Primary | ICD-10-CM

## 2018-06-12 PROCEDURE — 99214 OFFICE O/P EST MOD 30 MIN: CPT | Performed by: NURSE PRACTITIONER

## 2018-06-12 PROCEDURE — G8427 DOCREV CUR MEDS BY ELIG CLIN: HCPCS | Performed by: NURSE PRACTITIONER

## 2018-06-12 PROCEDURE — G8417 CALC BMI ABV UP PARAM F/U: HCPCS | Performed by: NURSE PRACTITIONER

## 2018-06-12 PROCEDURE — 3017F COLORECTAL CA SCREEN DOC REV: CPT | Performed by: NURSE PRACTITIONER

## 2018-06-12 PROCEDURE — 4004F PT TOBACCO SCREEN RCVD TLK: CPT | Performed by: NURSE PRACTITIONER

## 2018-06-12 PROCEDURE — 80307 DRUG TEST PRSMV CHEM ANLYZR: CPT

## 2018-06-12 ASSESSMENT — ENCOUNTER SYMPTOMS: BACK PAIN: 1

## 2018-06-15 LAB
6-ACETYLMORPHINE, UR: NOT DETECTED
7-AMINOCLONAZEPAM, URINE: NOT DETECTED
ALPHA-OH-ALPRAZ, URINE: NOT DETECTED
ALPRAZOLAM, URINE: NOT DETECTED
AMPHETAMINES, URINE: NOT DETECTED
BARBITURATES, URINE: NOT DETECTED
BENZOYLECGONINE, UR: NOT DETECTED
BUPRENORPHINE URINE: NOT DETECTED
CARISOPRODOL, UR: NOT DETECTED
CLONAZEPAM, URINE: NOT DETECTED
CODEINE, URINE: NOT DETECTED
CREATININE URINE: 85 MG/DL (ref 20–400)
DIAZEPAM, URINE: NOT DETECTED
EER PAIN MGT DRUG PANEL, HIGH RES/EMIT U: NORMAL
ETHYL GLUCURONIDE UR: NOT DETECTED
FENTANYL URINE: NOT DETECTED
HYDROCODONE, URINE: PRESENT
HYDROMORPHONE, URINE: NOT DETECTED
LORAZEPAM, URINE: NOT DETECTED
MARIJUANA METAB, UR: NOT DETECTED
MDA, UR: NOT DETECTED
MDEA, EVE, UR: NOT DETECTED
MDMA URINE: NOT DETECTED
MEPERIDINE METAB, UR: NOT DETECTED
METHADONE, URINE: NOT DETECTED
METHAMPHETAMINE, URINE: NOT DETECTED
METHYLPHENIDATE: NOT DETECTED
MIDAZOLAM, URINE: NOT DETECTED
MORPHINE URINE: NOT DETECTED
NORBUPRENORPHINE, URINE: NOT DETECTED
NORDIAZEPAM, URINE: NOT DETECTED
NORFENTANYL, URINE: NOT DETECTED
NORHYDROCODONE, URINE: PRESENT
NOROXYCODONE, URINE: PRESENT
NOROXYMORPHONE, URINE: NOT DETECTED
OXAZEPAM, URINE: NOT DETECTED
OXYCODONE URINE: PRESENT
OXYMORPHONE, URINE: NOT DETECTED
PAIN MGT DRUG PANEL, HI RES, UR: NORMAL
PCP,URINE: NOT DETECTED
PHENTERMINE, UR: NOT DETECTED
PROPOXYPHENE, URINE: NOT DETECTED
TAPENTADOL, URINE: NOT DETECTED
TAPENTADOL-O-SULFATE, URINE: NOT DETECTED
TEMAZEPAM, URINE: NOT DETECTED
TRAMADOL, URINE: NOT DETECTED
ZOLPIDEM, URINE: NOT DETECTED

## 2018-07-05 DIAGNOSIS — M54.42 CHRONIC BILATERAL LOW BACK PAIN WITH LEFT-SIDED SCIATICA: ICD-10-CM

## 2018-07-05 DIAGNOSIS — M17.12 PRIMARY OSTEOARTHRITIS OF LEFT KNEE: ICD-10-CM

## 2018-07-05 DIAGNOSIS — G89.29 CHRONIC CERVICAL PAIN: ICD-10-CM

## 2018-07-05 DIAGNOSIS — M54.2 CHRONIC CERVICAL PAIN: ICD-10-CM

## 2018-07-05 DIAGNOSIS — G89.29 CHRONIC BILATERAL LOW BACK PAIN WITH LEFT-SIDED SCIATICA: ICD-10-CM

## 2018-07-06 RX ORDER — HYDROCODONE BITARTRATE AND ACETAMINOPHEN 10; 325 MG/1; MG/1
1 TABLET ORAL EVERY 6 HOURS PRN
Qty: 120 TABLET | Refills: 0 | Status: SHIPPED | OUTPATIENT
Start: 2018-07-07 | End: 2018-08-02 | Stop reason: SDUPTHER

## 2018-07-06 RX ORDER — OXYCODONE HYDROCHLORIDE 30 MG/1
30 TABLET, FILM COATED, EXTENDED RELEASE ORAL EVERY 12 HOURS
Qty: 60 EACH | Refills: 0 | Status: SHIPPED | OUTPATIENT
Start: 2018-07-07 | End: 2018-08-02 | Stop reason: SDUPTHER

## 2018-08-02 DIAGNOSIS — M54.42 CHRONIC BILATERAL LOW BACK PAIN WITH LEFT-SIDED SCIATICA: ICD-10-CM

## 2018-08-02 DIAGNOSIS — G89.29 CHRONIC BILATERAL LOW BACK PAIN WITH LEFT-SIDED SCIATICA: ICD-10-CM

## 2018-08-02 DIAGNOSIS — M54.2 CHRONIC CERVICAL PAIN: ICD-10-CM

## 2018-08-02 DIAGNOSIS — G89.29 CHRONIC CERVICAL PAIN: ICD-10-CM

## 2018-08-02 DIAGNOSIS — M17.12 PRIMARY OSTEOARTHRITIS OF LEFT KNEE: ICD-10-CM

## 2018-08-02 RX ORDER — HYDROCODONE BITARTRATE AND ACETAMINOPHEN 10; 325 MG/1; MG/1
1 TABLET ORAL EVERY 6 HOURS PRN
Qty: 120 TABLET | Refills: 0 | Status: SHIPPED | OUTPATIENT
Start: 2018-08-06 | End: 2018-08-31 | Stop reason: SDUPTHER

## 2018-08-02 RX ORDER — OXYCODONE HYDROCHLORIDE 30 MG/1
30 TABLET, FILM COATED, EXTENDED RELEASE ORAL EVERY 12 HOURS
Qty: 60 EACH | Refills: 0 | Status: SHIPPED | OUTPATIENT
Start: 2018-08-06 | End: 2018-08-31 | Stop reason: SDUPTHER

## 2018-08-31 DIAGNOSIS — G89.29 CHRONIC BILATERAL LOW BACK PAIN WITH LEFT-SIDED SCIATICA: ICD-10-CM

## 2018-08-31 DIAGNOSIS — M17.12 PRIMARY OSTEOARTHRITIS OF LEFT KNEE: ICD-10-CM

## 2018-08-31 DIAGNOSIS — M54.42 CHRONIC BILATERAL LOW BACK PAIN WITH LEFT-SIDED SCIATICA: ICD-10-CM

## 2018-08-31 DIAGNOSIS — M54.2 CHRONIC CERVICAL PAIN: ICD-10-CM

## 2018-08-31 DIAGNOSIS — G89.29 CHRONIC CERVICAL PAIN: ICD-10-CM

## 2018-09-04 RX ORDER — OXYCODONE HYDROCHLORIDE 30 MG/1
30 TABLET, FILM COATED, EXTENDED RELEASE ORAL EVERY 12 HOURS
Qty: 60 EACH | Refills: 0 | Status: SHIPPED | OUTPATIENT
Start: 2018-09-05 | End: 2018-10-01 | Stop reason: SDUPTHER

## 2018-09-04 RX ORDER — HYDROCODONE BITARTRATE AND ACETAMINOPHEN 10; 325 MG/1; MG/1
1 TABLET ORAL EVERY 6 HOURS PRN
Qty: 120 TABLET | Refills: 0 | Status: SHIPPED | OUTPATIENT
Start: 2018-09-05 | End: 2018-10-01 | Stop reason: SDUPTHER

## 2018-09-13 ENCOUNTER — OFFICE VISIT (OUTPATIENT)
Dept: PAIN MANAGEMENT | Age: 63
End: 2018-09-13
Payer: MEDICARE

## 2018-09-13 ENCOUNTER — HOSPITAL ENCOUNTER (OUTPATIENT)
Age: 63
Setting detail: SPECIMEN
Discharge: HOME | End: 2018-09-13
Payer: MEDICARE

## 2018-09-13 VITALS
RESPIRATION RATE: 16 BRPM | DIASTOLIC BLOOD PRESSURE: 80 MMHG | BODY MASS INDEX: 29.53 KG/M2 | HEART RATE: 42 BPM | SYSTOLIC BLOOD PRESSURE: 124 MMHG | HEIGHT: 72 IN | WEIGHT: 218 LBS | OXYGEN SATURATION: 98 %

## 2018-09-13 DIAGNOSIS — M17.12 PRIMARY OSTEOARTHRITIS OF LEFT KNEE: ICD-10-CM

## 2018-09-13 DIAGNOSIS — M25.562 CHRONIC PAIN OF LEFT KNEE: ICD-10-CM

## 2018-09-13 DIAGNOSIS — G89.29 CHRONIC PAIN OF LEFT KNEE: ICD-10-CM

## 2018-09-13 DIAGNOSIS — Z79.891 ENCOUNTER FOR LONG-TERM OPIATE ANALGESIC USE: ICD-10-CM

## 2018-09-13 DIAGNOSIS — M54.42 CHRONIC BILATERAL LOW BACK PAIN WITH LEFT-SIDED SCIATICA: Primary | ICD-10-CM

## 2018-09-13 DIAGNOSIS — G89.29 CHRONIC BILATERAL LOW BACK PAIN WITH LEFT-SIDED SCIATICA: Primary | ICD-10-CM

## 2018-09-13 DIAGNOSIS — Z02.83 ENCOUNTER FOR DRUG SCREENING: ICD-10-CM

## 2018-09-13 PROCEDURE — 99214 OFFICE O/P EST MOD 30 MIN: CPT | Performed by: NURSE PRACTITIONER

## 2018-09-13 PROCEDURE — 3017F COLORECTAL CA SCREEN DOC REV: CPT | Performed by: NURSE PRACTITIONER

## 2018-09-13 PROCEDURE — 4004F PT TOBACCO SCREEN RCVD TLK: CPT | Performed by: NURSE PRACTITIONER

## 2018-09-13 PROCEDURE — 80307 DRUG TEST PRSMV CHEM ANLYZR: CPT

## 2018-09-13 PROCEDURE — G8417 CALC BMI ABV UP PARAM F/U: HCPCS | Performed by: NURSE PRACTITIONER

## 2018-09-13 PROCEDURE — G8427 DOCREV CUR MEDS BY ELIG CLIN: HCPCS | Performed by: NURSE PRACTITIONER

## 2018-09-13 RX ORDER — ASPIRIN 81 MG/1
81 TABLET, CHEWABLE ORAL
COMMUNITY
Start: 2013-11-10 | End: 2021-10-18

## 2018-09-13 RX ORDER — CETIRIZINE HYDROCHLORIDE 10 MG/1
10 TABLET ORAL
COMMUNITY
Start: 2018-07-27 | End: 2019-05-15 | Stop reason: ALTCHOICE

## 2018-09-13 ASSESSMENT — ENCOUNTER SYMPTOMS: BACK PAIN: 1

## 2018-09-13 NOTE — PATIENT INSTRUCTIONS
Pain Management Plan:  1. Continue current pain medications to improve quality of life and function. Pain clinic phone numbers  Refills  - Call [phone]. Please leave your name, a working phone number, date of birth, the name, dose, quantity, and last refill date of the prescription, and the pharmacy. Medication or Procedure Questions - Call [phone]. This is the direct line to the [facility] Pain Management Nurses. Appointment or General Questions - Call  [phone]. This is the direct line the  [address] [place] [address] can also use [facility] [employer]. Is your [employer] Activated? How to dispose of unused pain medications safely:  · Flush all unused pain medications. This is the FDA's recommended way of disposing these medications. · All other medications can be disposed in the trash mixed in undesirable contents (used coffee grounds, used [employer], etc).

## 2018-09-16 LAB
6-ACETYLMORPHINE, UR: NOT DETECTED
7-AMINOCLONAZEPAM, URINE: NOT DETECTED
ALPHA-OH-ALPRAZ, URINE: NOT DETECTED
ALPRAZOLAM, URINE: NOT DETECTED
AMPHETAMINES, URINE: NOT DETECTED
BARBITURATES, URINE: NOT DETECTED
BENZOYLECGONINE, UR: NOT DETECTED
BUPRENORPHINE URINE: NOT DETECTED
CARISOPRODOL, UR: NOT DETECTED
CLONAZEPAM, URINE: NOT DETECTED
CODEINE, URINE: NOT DETECTED
CREATININE URINE: 197 MG/DL (ref 20–400)
DIAZEPAM, URINE: NOT DETECTED
EER PAIN MGT DRUG PANEL, HIGH RES/EMIT U: NORMAL
ETHYL GLUCURONIDE UR: NOT DETECTED
FENTANYL URINE: NOT DETECTED
HYDROCODONE, URINE: PRESENT
HYDROMORPHONE, URINE: NOT DETECTED
LORAZEPAM, URINE: NOT DETECTED
MARIJUANA METAB, UR: NOT DETECTED
MDA, UR: NOT DETECTED
MDEA, EVE, UR: NOT DETECTED
MDMA URINE: NOT DETECTED
MEPERIDINE METAB, UR: NOT DETECTED
METHADONE, URINE: NOT DETECTED
METHAMPHETAMINE, URINE: NOT DETECTED
METHYLPHENIDATE: NOT DETECTED
MIDAZOLAM, URINE: NOT DETECTED
MORPHINE URINE: NOT DETECTED
NORBUPRENORPHINE, URINE: NOT DETECTED
NORDIAZEPAM, URINE: NOT DETECTED
NORFENTANYL, URINE: NOT DETECTED
NORHYDROCODONE, URINE: PRESENT
NOROXYCODONE, URINE: PRESENT
NOROXYMORPHONE, URINE: NOT DETECTED
OXAZEPAM, URINE: NOT DETECTED
OXYCODONE URINE: PRESENT
OXYMORPHONE, URINE: NOT DETECTED
PAIN MGT DRUG PANEL, HI RES, UR: NORMAL
PCP,URINE: NOT DETECTED
PHENTERMINE, UR: NOT DETECTED
PROPOXYPHENE, URINE: NOT DETECTED
TAPENTADOL, URINE: NOT DETECTED
TAPENTADOL-O-SULFATE, URINE: NOT DETECTED
TEMAZEPAM, URINE: NOT DETECTED
TRAMADOL, URINE: NOT DETECTED
ZOLPIDEM, URINE: NOT DETECTED

## 2018-10-01 DIAGNOSIS — G89.29 CHRONIC CERVICAL PAIN: ICD-10-CM

## 2018-10-01 DIAGNOSIS — M54.2 CHRONIC CERVICAL PAIN: ICD-10-CM

## 2018-10-01 DIAGNOSIS — M54.42 CHRONIC BILATERAL LOW BACK PAIN WITH LEFT-SIDED SCIATICA: ICD-10-CM

## 2018-10-01 DIAGNOSIS — M17.12 PRIMARY OSTEOARTHRITIS OF LEFT KNEE: ICD-10-CM

## 2018-10-01 DIAGNOSIS — G89.29 CHRONIC BILATERAL LOW BACK PAIN WITH LEFT-SIDED SCIATICA: ICD-10-CM

## 2018-10-01 RX ORDER — OXYCODONE HYDROCHLORIDE 30 MG/1
30 TABLET, FILM COATED, EXTENDED RELEASE ORAL EVERY 12 HOURS
Qty: 60 EACH | Refills: 0 | Status: SHIPPED | OUTPATIENT
Start: 2018-10-05 | End: 2018-10-30 | Stop reason: SDUPTHER

## 2018-10-01 RX ORDER — HYDROCODONE BITARTRATE AND ACETAMINOPHEN 10; 325 MG/1; MG/1
1 TABLET ORAL EVERY 6 HOURS PRN
Qty: 120 TABLET | Refills: 0 | Status: SHIPPED | OUTPATIENT
Start: 2018-10-05 | End: 2018-10-30 | Stop reason: SDUPTHER

## 2018-10-01 NOTE — TELEPHONE ENCOUNTER
Pt called the refill line on 9/30/18 to request a refill of norco and oxycodone to be sent to [facility].      Last Appt:  9/13/2018  Next Appt:   12/13/2018  Med verified in Epic

## 2018-10-30 DIAGNOSIS — M54.42 CHRONIC BILATERAL LOW BACK PAIN WITH LEFT-SIDED SCIATICA: ICD-10-CM

## 2018-10-30 DIAGNOSIS — G89.29 CHRONIC CERVICAL PAIN: ICD-10-CM

## 2018-10-30 DIAGNOSIS — M17.12 PRIMARY OSTEOARTHRITIS OF LEFT KNEE: ICD-10-CM

## 2018-10-30 DIAGNOSIS — M54.2 CHRONIC CERVICAL PAIN: ICD-10-CM

## 2018-10-30 DIAGNOSIS — G89.29 CHRONIC BILATERAL LOW BACK PAIN WITH LEFT-SIDED SCIATICA: ICD-10-CM

## 2018-10-30 RX ORDER — OXYCODONE HYDROCHLORIDE 30 MG/1
30 TABLET, FILM COATED, EXTENDED RELEASE ORAL EVERY 12 HOURS
Qty: 60 EACH | Refills: 0 | Status: SHIPPED | OUTPATIENT
Start: 2018-11-05 | End: 2018-11-30 | Stop reason: SDUPTHER

## 2018-10-30 RX ORDER — HYDROCODONE BITARTRATE AND ACETAMINOPHEN 10; 325 MG/1; MG/1
1 TABLET ORAL EVERY 6 HOURS PRN
Qty: 120 TABLET | Refills: 0 | Status: SHIPPED | OUTPATIENT
Start: 2018-11-05 | End: 2018-11-30 | Stop reason: SDUPTHER

## 2018-11-30 DIAGNOSIS — M17.12 PRIMARY OSTEOARTHRITIS OF LEFT KNEE: ICD-10-CM

## 2018-11-30 DIAGNOSIS — G89.29 CHRONIC CERVICAL PAIN: ICD-10-CM

## 2018-11-30 DIAGNOSIS — M54.42 CHRONIC BILATERAL LOW BACK PAIN WITH LEFT-SIDED SCIATICA: ICD-10-CM

## 2018-11-30 DIAGNOSIS — G89.29 CHRONIC BILATERAL LOW BACK PAIN WITH LEFT-SIDED SCIATICA: ICD-10-CM

## 2018-11-30 DIAGNOSIS — M54.2 CHRONIC CERVICAL PAIN: ICD-10-CM

## 2018-11-30 NOTE — TELEPHONE ENCOUNTER
Pt called the refill line requesting a refill of norco and oxycodone. OAR[place] Report checked for [place], and [place]: 11/5/18 norco 10-325mg #120. Due 12/5/18.        11/5/18 oxycodone 30mg #60. Due 12/5/18.     Last Appt:  9/13/2018  Next Appt:   12/13/2018  Med verified in Epic

## 2018-12-03 RX ORDER — OXYCODONE HYDROCHLORIDE 30 MG/1
30 TABLET, FILM COATED, EXTENDED RELEASE ORAL EVERY 12 HOURS
Qty: 60 EACH | Refills: 0 | Status: SHIPPED | OUTPATIENT
Start: 2018-12-05 | End: 2018-12-04 | Stop reason: SDUPTHER

## 2018-12-03 RX ORDER — HYDROCODONE BITARTRATE AND ACETAMINOPHEN 10; 325 MG/1; MG/1
1 TABLET ORAL EVERY 6 HOURS PRN
Qty: 120 TABLET | Refills: 0 | Status: SHIPPED | OUTPATIENT
Start: 2018-12-05 | End: 2018-12-04 | Stop reason: SDUPTHER

## 2018-12-04 DIAGNOSIS — M54.2 CHRONIC CERVICAL PAIN: ICD-10-CM

## 2018-12-04 DIAGNOSIS — G89.29 CHRONIC CERVICAL PAIN: ICD-10-CM

## 2018-12-04 DIAGNOSIS — G89.29 CHRONIC BILATERAL LOW BACK PAIN WITH LEFT-SIDED SCIATICA: ICD-10-CM

## 2018-12-04 DIAGNOSIS — M54.42 CHRONIC BILATERAL LOW BACK PAIN WITH LEFT-SIDED SCIATICA: ICD-10-CM

## 2018-12-04 DIAGNOSIS — M17.12 PRIMARY OSTEOARTHRITIS OF LEFT KNEE: ICD-10-CM

## 2018-12-04 RX ORDER — HYDROCODONE BITARTRATE AND ACETAMINOPHEN 10; 325 MG/1; MG/1
1 TABLET ORAL EVERY 6 HOURS PRN
Qty: 120 TABLET | Refills: 0 | Status: SHIPPED | OUTPATIENT
Start: 2018-12-05 | End: 2019-01-03 | Stop reason: SDUPTHER

## 2018-12-04 RX ORDER — OXYCODONE HYDROCHLORIDE 30 MG/1
30 TABLET, FILM COATED, EXTENDED RELEASE ORAL EVERY 12 HOURS
Qty: 60 EACH | Refills: 0 | Status: SHIPPED | OUTPATIENT
Start: 2018-12-05 | End: 2018-12-31 | Stop reason: SDUPTHER

## 2018-12-13 ENCOUNTER — OFFICE VISIT (OUTPATIENT)
Dept: PAIN MANAGEMENT | Age: 63
End: 2018-12-13
Payer: MEDICARE

## 2018-12-13 VITALS
SYSTOLIC BLOOD PRESSURE: 112 MMHG | HEIGHT: 72 IN | WEIGHT: 220 LBS | BODY MASS INDEX: 29.8 KG/M2 | DIASTOLIC BLOOD PRESSURE: 68 MMHG | HEART RATE: 60 BPM

## 2018-12-13 DIAGNOSIS — M79.18 CHRONIC MYOFASCIAL PAIN: Primary | ICD-10-CM

## 2018-12-13 DIAGNOSIS — M25.562 CHRONIC PAIN OF LEFT KNEE: ICD-10-CM

## 2018-12-13 DIAGNOSIS — G89.29 CHRONIC MYOFASCIAL PAIN: Primary | ICD-10-CM

## 2018-12-13 DIAGNOSIS — G89.29 CHRONIC PAIN OF LEFT KNEE: ICD-10-CM

## 2018-12-13 PROCEDURE — G8427 DOCREV CUR MEDS BY ELIG CLIN: HCPCS | Performed by: NURSE PRACTITIONER

## 2018-12-13 PROCEDURE — 99214 OFFICE O/P EST MOD 30 MIN: CPT | Performed by: NURSE PRACTITIONER

## 2018-12-13 PROCEDURE — 4004F PT TOBACCO SCREEN RCVD TLK: CPT | Performed by: NURSE PRACTITIONER

## 2018-12-13 PROCEDURE — G8484 FLU IMMUNIZE NO ADMIN: HCPCS | Performed by: NURSE PRACTITIONER

## 2018-12-13 PROCEDURE — 3017F COLORECTAL CA SCREEN DOC REV: CPT | Performed by: NURSE PRACTITIONER

## 2018-12-13 PROCEDURE — G8417 CALC BMI ABV UP PARAM F/U: HCPCS | Performed by: NURSE PRACTITIONER

## 2018-12-13 ASSESSMENT — ENCOUNTER SYMPTOMS
EYES NEGATIVE: 1
SHORTNESS OF BREATH: 0
CONSTIPATION: 0
BACK PAIN: 1

## 2018-12-31 DIAGNOSIS — M54.42 CHRONIC BILATERAL LOW BACK PAIN WITH LEFT-SIDED SCIATICA: ICD-10-CM

## 2018-12-31 DIAGNOSIS — G89.29 CHRONIC CERVICAL PAIN: ICD-10-CM

## 2018-12-31 DIAGNOSIS — M17.12 PRIMARY OSTEOARTHRITIS OF LEFT KNEE: ICD-10-CM

## 2018-12-31 DIAGNOSIS — G89.29 CHRONIC BILATERAL LOW BACK PAIN WITH LEFT-SIDED SCIATICA: ICD-10-CM

## 2018-12-31 DIAGNOSIS — M54.2 CHRONIC CERVICAL PAIN: ICD-10-CM

## 2019-01-03 RX ORDER — OXYCODONE HYDROCHLORIDE 30 MG/1
30 TABLET, FILM COATED, EXTENDED RELEASE ORAL EVERY 12 HOURS
Qty: 60 EACH | Refills: 0 | Status: SHIPPED | OUTPATIENT
Start: 2019-01-04 | End: 2019-02-01 | Stop reason: SDUPTHER

## 2019-01-03 RX ORDER — HYDROCODONE BITARTRATE AND ACETAMINOPHEN 10; 325 MG/1; MG/1
1 TABLET ORAL EVERY 6 HOURS PRN
Qty: 120 TABLET | Refills: 0 | Status: SHIPPED | OUTPATIENT
Start: 2019-01-04 | End: 2019-02-01 | Stop reason: SDUPTHER

## 2019-02-01 DIAGNOSIS — G89.29 CHRONIC BILATERAL LOW BACK PAIN WITH LEFT-SIDED SCIATICA: ICD-10-CM

## 2019-02-01 DIAGNOSIS — M54.2 CHRONIC CERVICAL PAIN: ICD-10-CM

## 2019-02-01 DIAGNOSIS — M17.12 PRIMARY OSTEOARTHRITIS OF LEFT KNEE: ICD-10-CM

## 2019-02-01 DIAGNOSIS — G89.29 CHRONIC CERVICAL PAIN: ICD-10-CM

## 2019-02-01 DIAGNOSIS — M54.42 CHRONIC BILATERAL LOW BACK PAIN WITH LEFT-SIDED SCIATICA: ICD-10-CM

## 2019-02-01 RX ORDER — HYDROCODONE BITARTRATE AND ACETAMINOPHEN 10; 325 MG/1; MG/1
1 TABLET ORAL EVERY 6 HOURS PRN
Qty: 120 TABLET | Refills: 0 | Status: SHIPPED | OUTPATIENT
Start: 2019-02-02 | End: 2019-02-25 | Stop reason: SDUPTHER

## 2019-02-01 RX ORDER — OXYCODONE HYDROCHLORIDE 30 MG/1
30 TABLET, FILM COATED, EXTENDED RELEASE ORAL EVERY 12 HOURS
Qty: 60 EACH | Refills: 0 | Status: SHIPPED | OUTPATIENT
Start: 2019-02-02 | End: 2019-02-25 | Stop reason: SDUPTHER

## 2019-02-25 DIAGNOSIS — M54.42 CHRONIC BILATERAL LOW BACK PAIN WITH LEFT-SIDED SCIATICA: ICD-10-CM

## 2019-02-25 DIAGNOSIS — M17.12 PRIMARY OSTEOARTHRITIS OF LEFT KNEE: ICD-10-CM

## 2019-02-25 DIAGNOSIS — G89.29 CHRONIC BILATERAL LOW BACK PAIN WITH LEFT-SIDED SCIATICA: ICD-10-CM

## 2019-02-25 DIAGNOSIS — G89.29 CHRONIC CERVICAL PAIN: ICD-10-CM

## 2019-02-25 DIAGNOSIS — M54.2 CHRONIC CERVICAL PAIN: ICD-10-CM

## 2019-02-25 RX ORDER — HYDROCODONE BITARTRATE AND ACETAMINOPHEN 10; 325 MG/1; MG/1
1 TABLET ORAL EVERY 6 HOURS PRN
Qty: 120 TABLET | Refills: 0 | Status: SHIPPED | OUTPATIENT
Start: 2019-03-05 | End: 2019-03-28 | Stop reason: SDUPTHER

## 2019-02-25 RX ORDER — OXYCODONE HYDROCHLORIDE 30 MG/1
30 TABLET, FILM COATED, EXTENDED RELEASE ORAL EVERY 12 HOURS
Qty: 60 EACH | Refills: 0 | Status: SHIPPED | OUTPATIENT
Start: 2019-03-05 | End: 2019-03-28 | Stop reason: SDUPTHER

## 2019-03-13 ENCOUNTER — OFFICE VISIT (OUTPATIENT)
Dept: PAIN MANAGEMENT | Age: 64
End: 2019-03-13
Payer: MEDICARE

## 2019-03-13 VITALS
RESPIRATION RATE: 16 BRPM | HEIGHT: 72 IN | OXYGEN SATURATION: 98 % | SYSTOLIC BLOOD PRESSURE: 130 MMHG | BODY MASS INDEX: 31.97 KG/M2 | WEIGHT: 236 LBS | DIASTOLIC BLOOD PRESSURE: 82 MMHG | HEART RATE: 75 BPM

## 2019-03-13 DIAGNOSIS — G89.29 CHRONIC PAIN OF LEFT KNEE: ICD-10-CM

## 2019-03-13 DIAGNOSIS — M25.562 CHRONIC PAIN OF LEFT KNEE: ICD-10-CM

## 2019-03-13 DIAGNOSIS — M79.18 CHRONIC MYOFASCIAL PAIN: ICD-10-CM

## 2019-03-13 DIAGNOSIS — M54.2 CHRONIC CERVICAL PAIN: ICD-10-CM

## 2019-03-13 DIAGNOSIS — G89.29 CHRONIC MYOFASCIAL PAIN: ICD-10-CM

## 2019-03-13 DIAGNOSIS — G89.29 CHRONIC LEFT-SIDED LOW BACK PAIN WITH LEFT-SIDED SCIATICA: Primary | ICD-10-CM

## 2019-03-13 DIAGNOSIS — M54.42 CHRONIC LEFT-SIDED LOW BACK PAIN WITH LEFT-SIDED SCIATICA: Primary | ICD-10-CM

## 2019-03-13 DIAGNOSIS — G89.29 CHRONIC CERVICAL PAIN: ICD-10-CM

## 2019-03-13 PROCEDURE — 4004F PT TOBACCO SCREEN RCVD TLK: CPT | Performed by: NURSE PRACTITIONER

## 2019-03-13 PROCEDURE — 99214 OFFICE O/P EST MOD 30 MIN: CPT | Performed by: NURSE PRACTITIONER

## 2019-03-13 PROCEDURE — G8484 FLU IMMUNIZE NO ADMIN: HCPCS | Performed by: NURSE PRACTITIONER

## 2019-03-13 PROCEDURE — G8428 CUR MEDS NOT DOCUMENT: HCPCS | Performed by: NURSE PRACTITIONER

## 2019-03-13 PROCEDURE — G8417 CALC BMI ABV UP PARAM F/U: HCPCS | Performed by: NURSE PRACTITIONER

## 2019-03-13 PROCEDURE — 3017F COLORECTAL CA SCREEN DOC REV: CPT | Performed by: NURSE PRACTITIONER

## 2019-03-13 ASSESSMENT — ENCOUNTER SYMPTOMS
SHORTNESS OF BREATH: 0
BACK PAIN: 1
CONSTIPATION: 0
EYES NEGATIVE: 1

## 2019-03-28 DIAGNOSIS — G89.29 CHRONIC CERVICAL PAIN: ICD-10-CM

## 2019-03-28 DIAGNOSIS — M54.2 CHRONIC CERVICAL PAIN: ICD-10-CM

## 2019-03-28 DIAGNOSIS — G89.29 CHRONIC BILATERAL LOW BACK PAIN WITH LEFT-SIDED SCIATICA: ICD-10-CM

## 2019-03-28 DIAGNOSIS — M17.12 PRIMARY OSTEOARTHRITIS OF LEFT KNEE: ICD-10-CM

## 2019-03-28 DIAGNOSIS — M54.42 CHRONIC BILATERAL LOW BACK PAIN WITH LEFT-SIDED SCIATICA: ICD-10-CM

## 2019-03-28 NOTE — TELEPHONE ENCOUNTER
The patient called the refill line requesting a refill of norco and oxycodone 30mg. O[place] Report checked for [place]: 3/5/19 norco 10-325mg #120. Due 4/4/19.          3/5/19 oxycontin 30mg #60. Due 4/4/19.     Last Appt:  3/13/2019  Next Appt:   5/15/2019  Med verified in [facility] [address]

## 2019-04-01 RX ORDER — OXYCODONE HYDROCHLORIDE 30 MG/1
30 TABLET, FILM COATED, EXTENDED RELEASE ORAL EVERY 12 HOURS
Qty: 60 EACH | Refills: 0 | Status: SHIPPED | OUTPATIENT
Start: 2019-04-04 | End: 2019-05-01 | Stop reason: SDUPTHER

## 2019-04-01 RX ORDER — HYDROCODONE BITARTRATE AND ACETAMINOPHEN 10; 325 MG/1; MG/1
1 TABLET ORAL EVERY 6 HOURS PRN
Qty: 120 TABLET | Refills: 0 | Status: SHIPPED | OUTPATIENT
Start: 2019-04-04 | End: 2019-05-01 | Stop reason: SDUPTHER

## 2019-05-01 DIAGNOSIS — M54.42 CHRONIC BILATERAL LOW BACK PAIN WITH LEFT-SIDED SCIATICA: ICD-10-CM

## 2019-05-01 DIAGNOSIS — G89.29 CHRONIC CERVICAL PAIN: ICD-10-CM

## 2019-05-01 DIAGNOSIS — M17.12 PRIMARY OSTEOARTHRITIS OF LEFT KNEE: ICD-10-CM

## 2019-05-01 DIAGNOSIS — M54.2 CHRONIC CERVICAL PAIN: ICD-10-CM

## 2019-05-01 DIAGNOSIS — G89.29 CHRONIC BILATERAL LOW BACK PAIN WITH LEFT-SIDED SCIATICA: ICD-10-CM

## 2019-05-01 RX ORDER — OXYCODONE HYDROCHLORIDE 30 MG/1
30 TABLET, FILM COATED, EXTENDED RELEASE ORAL EVERY 12 HOURS
Qty: 60 EACH | Refills: 0 | Status: SHIPPED | OUTPATIENT
Start: 2019-05-04 | End: 2019-06-03 | Stop reason: SDUPTHER

## 2019-05-01 RX ORDER — HYDROCODONE BITARTRATE AND ACETAMINOPHEN 10; 325 MG/1; MG/1
1 TABLET ORAL EVERY 6 HOURS PRN
Qty: 120 TABLET | Refills: 0 | Status: SHIPPED | OUTPATIENT
Start: 2019-05-04 | End: 2019-06-03 | Stop reason: SDUPTHER

## 2019-05-01 NOTE — TELEPHONE ENCOUNTER
The patient called the refill line requesting a refill of oxycontin 30mg and norco 10-325mg. [place] checked for [place]: 4/4/19 oxycontin ER 30mg #60. Due 5/4/19.          4/4/19 norco 10-325mg #120. Due 5/4/19.     Last Appt:  3/13/2019  Next Appt:   5/15/2019  Med verified in [facility] [address]

## 2019-05-15 ENCOUNTER — OFFICE VISIT (OUTPATIENT)
Dept: PAIN MANAGEMENT | Age: 64
End: 2019-05-15
Payer: MEDICARE

## 2019-05-15 ENCOUNTER — HOSPITAL ENCOUNTER (OUTPATIENT)
Age: 64
Setting detail: SPECIMEN
Discharge: HOME | End: 2019-05-15
Payer: MEDICARE

## 2019-05-15 VITALS
SYSTOLIC BLOOD PRESSURE: 90 MMHG | HEART RATE: 80 BPM | BODY MASS INDEX: 30.48 KG/M2 | DIASTOLIC BLOOD PRESSURE: 54 MMHG | WEIGHT: 225 LBS | HEIGHT: 72 IN

## 2019-05-15 DIAGNOSIS — M79.18 CHRONIC MYOFASCIAL PAIN: ICD-10-CM

## 2019-05-15 DIAGNOSIS — Z02.83 ENCOUNTER FOR DRUG SCREENING: ICD-10-CM

## 2019-05-15 DIAGNOSIS — M54.42 CHRONIC LEFT-SIDED LOW BACK PAIN WITH LEFT-SIDED SCIATICA: Primary | ICD-10-CM

## 2019-05-15 DIAGNOSIS — G62.9 NEUROPATHY: ICD-10-CM

## 2019-05-15 DIAGNOSIS — G89.29 CHRONIC LEFT-SIDED LOW BACK PAIN WITH LEFT-SIDED SCIATICA: Primary | ICD-10-CM

## 2019-05-15 DIAGNOSIS — Z79.891 ENCOUNTER FOR LONG-TERM OPIATE ANALGESIC USE: ICD-10-CM

## 2019-05-15 DIAGNOSIS — G47.9 SLEEP DISTURBANCE: ICD-10-CM

## 2019-05-15 DIAGNOSIS — G89.29 CHRONIC MYOFASCIAL PAIN: ICD-10-CM

## 2019-05-15 DIAGNOSIS — R25.2 LEG CRAMPS: ICD-10-CM

## 2019-05-15 PROCEDURE — G8417 CALC BMI ABV UP PARAM F/U: HCPCS | Performed by: NURSE PRACTITIONER

## 2019-05-15 PROCEDURE — 99214 OFFICE O/P EST MOD 30 MIN: CPT | Performed by: NURSE PRACTITIONER

## 2019-05-15 PROCEDURE — 4004F PT TOBACCO SCREEN RCVD TLK: CPT | Performed by: NURSE PRACTITIONER

## 2019-05-15 PROCEDURE — G8427 DOCREV CUR MEDS BY ELIG CLIN: HCPCS | Performed by: NURSE PRACTITIONER

## 2019-05-15 PROCEDURE — 3017F COLORECTAL CA SCREEN DOC REV: CPT | Performed by: NURSE PRACTITIONER

## 2019-05-15 PROCEDURE — 80307 DRUG TEST PRSMV CHEM ANLYZR: CPT

## 2019-05-15 RX ORDER — DIPHENHYDRAMINE HCL 25 MG
25 CAPSULE ORAL EVERY 6 HOURS PRN
COMMUNITY
End: 2021-04-19

## 2019-05-15 RX ORDER — GABAPENTIN 300 MG/1
300 CAPSULE ORAL NIGHTLY
Qty: 30 CAPSULE | Refills: 1 | Status: SHIPPED | OUTPATIENT
Start: 2019-05-15 | End: 2019-06-26

## 2019-05-15 ASSESSMENT — ENCOUNTER SYMPTOMS
CONSTIPATION: 0
BACK PAIN: 1
SHORTNESS OF BREATH: 0
EYES NEGATIVE: 1

## 2019-05-15 NOTE — PROGRESS NOTES
Subjective:      Patient ID: Cristhian Aaron is a 59 y.o. male. Chief Complaint   Patient presents with    Back Pain     low back pain radiating down left leg and left knee pain     HPI   Here today for routine pain clinic recheck, medication management. Pain reduced on current medications, denies side effects, maintaining ADL's. Still working part time, some days increased pain. Leg cramps at night, interfering with sleep    ADVERSE MEDICATION EFFECTS:   Nausea and vomiting: no   Constipation: no-Undercontrol-: no  Dizziness/drowsy/sleepy--not applicable  Urinary Retention: no    ACTIVITY/SOCIAL/EMOTIONAL:  Sleep Pattern: 6 hours per night.  generally restful sleep  Home Exercises: daily walking  Activity:unchanged  Emotional Issues: normal.   Currently seeing a Psychiatrist or Psychologist:  No      ABERRANT BEHAVIORS SINCE LAST VISIT  Lost rx/pills:------------------------------------------ no  Taking  medication as prescribed: ----------- no  Urine Drug Screen ---------------------------------  yes  Recent ER visits: -------------------------------------No  Pill count is appropriate: ---------------------------not applicable   Refills for prescriptions appropriate:---------- yes      Pain Assessment  Location of Pain: Back  Location Modifiers: Left(pain radiated down left side)  Severity of Pain: 5  Quality of Pain: Throbbing, Sharp, Dull, Aching, Grinding  Duration of Pain: A few hours  Frequency of Pain: Intermittent(pt states worse at night)  Aggravating Factors: Bending, Kneeling, Walking, Stairs  Limiting Behavior: Yes  Relieving Factors: Rest, Nsaids(pain gel and pain medication)  Result of Injury: No  Are there other pain locations you wish to document?: No    Allergies   Allergen Reactions    Penicillins Shortness Of Breath    Cephalexin Hives    Adhesive Tape Rash       Outpatient Medications Marked as Taking for the 5/15/19 encounter (Office Visit) with SAHARA Rendon - CNP Medication Sig Dispense Refill    diphenhydrAMINE (BENADRYL ALLERGY) 25 MG capsule Take 25 mg by mouth every 6 hours as needed for Itching      gabapentin (NEURONTIN) 300 MG capsule Take 1 capsule by mouth nightly for 30 days. Intended supply: 90 days 30 capsule 1    oxyCODONE (OXYCONTIN) 30 MG T12A extended release tablet Take 30 mg by mouth every 12 hours for 30 days. 60 each 0    HYDROcodone-acetaminophen (NORCO)  MG per tablet Take 1 tablet by mouth every 6 hours as needed for Pain for up to 30 days. 120 tablet 0    aspirin 81 MG chewable tablet Take 81 mg by mouth      levothyroxine (SYNTHROID) 150 MCG tablet Take 150 mcg by mouth every other day       diclofenac sodium (VOLTAREN) 1 % GEL Apply 2 g topically 4 times daily as needed 5 Tube 11    dexlansoprazole (DEXILANT) 60 MG CPDR capsule Take 60 mg by mouth every morning       pravastatin (PRAVACHOL) 40 MG tablet Take 40 mg by mouth daily.  albuterol (PROVENTIL HFA;VENTOLIN HFA) 108 (90 BASE) MCG/ACT inhaler Inhale 2 puffs into the lungs every 6 hours as needed for Wheezing.       warfarin (COUMADIN) 5 MG tablet Take 5 mg by mouth daily Indications: the patient takes 1.5 tabs of 5mg tablet for total of 7.5mg          Past Medical History:   Diagnosis Date    Backache, unspecified     Chronic pain of left knee     had supartz x 2 with no relief from [address] COPD (chronic obstructive pulmonary disease) ([facility] [address]     Depression     Hyperlipidemia     Hypothyroidism     Other pulmonary embolism and infarction     Tobacco use disorder     WPW syndrome        Past Surgical History:   Procedure Laterality Date    CARDIAC CATHETERIZATION  2013    FINGER SURGERY Right 2006    index finger flexor tendon repair    FINGER SURGERY Left 2014    KNEE ARTHROSCOPY Left 2012    LUNG BIOPSY Left 2/4/2015    benign results    OTHER SURGICAL HISTORY  7/9/2014    L4/L5    SINUS SURGERY  11/04/2017    Dr. Duglas Mackenzie BIOPSY  2014, 2010    THROAT SURGERY  5/3/16    Abdominal fat used to repair vocal cords    TONSILLECTOMY         Family History   Problem Relation Age of Onset    Heart Disease Mother     Cancer Father         lung       Social History     Socioeconomic History    Marital status:      Spouse name: None    Number of children: None    Years of education: None    Highest education level: None   Occupational History    None   Social Needs    Financial resource strain: None    Food insecurity:     Worry: None     Inability: None    Transportation needs:     Medical: None     Non-medical: None   Tobacco Use    Smoking status: Current Every Day Smoker     Packs/day: 0.50     Types: Cigarettes    Smokeless tobacco: Never Used   Substance and Sexual Activity    Alcohol use: No    Drug use: No    Sexual activity: None   Lifestyle    Physical activity:     Days per week: None     Minutes per session: None    Stress: None   Relationships    Social connections:     Talks on phone: None     Gets together: None     Attends Faith service: None     Active member of club or organization: None     Attends meetings of clubs or organizations: None     Relationship status: None    Intimate partner violence:     Fear of current or ex partner: None     Emotionally abused: None     Physically abused: None     Forced sexual activity: None   Other Topics Concern    None   Social History Narrative    None     Review of Systems   Constitutional: Positive for fatigue. HENT: Negative. Eyes: Negative. Respiratory: Negative for shortness of breath. Cardiovascular: Negative for chest pain. Gastrointestinal: Negative for constipation. Musculoskeletal: Positive for arthralgias, back pain, myalgias and neck pain. Skin: Negative. Allergic/Immunologic: Negative for environmental allergies and food allergies. Neurological: Positive for weakness and numbness.    Psychiatric/Behavioral: Positive for sleep disturbance. Objective:   Physical Exam   Constitutional: He is oriented to person, place, and time. He appears well-developed and well-nourished. He is active and cooperative. Non-toxic appearance. He does not have a sickly appearance. He does not appear ill. No distress. HENT:   Head: Normocephalic and atraumatic. Mouth/Throat: Oropharynx is clear and moist and mucous membranes are normal.   Cardiovascular: Normal rate, intact distal pulses and normal pulses. Warm extremities. Normal capillary refill. Pulmonary/Chest: Effort normal.   Regular respiratory rate. No acute dyspnea. No audible wheezing. Neurological: He is alert and oriented to person, place, and time. He has normal strength and normal reflexes. He displays no atrophy. No cranial nerve deficit or sensory deficit. He exhibits normal muscle tone. Skin: Skin is warm, dry and intact. He is not diaphoretic. No pallor. Psychiatric: He has a normal mood and affect. His speech is normal and behavior is normal. Judgment normal. His affect is not angry. He is not agitated, not aggressive and not withdrawn. He does not express impulsivity or inappropriate judgment. Assessment:      1. Chronic left-sided low back pain with left-sided sciatica    2. Encounter for long-term opiate analgesic use    3. Encounter for drug screening    4. Chronic myofascial pain    5. Neuropathy    6. Leg cramps    7. Sleep disturbance          Plan:     Chronic pain diagnoses such as   1. Chronic left-sided low back pain with left-sided sciatica    2. Encounter for long-term opiate analgesic use    3. Encounter for drug screening    4. Chronic myofascial pain    5. Neuropathy    6. Leg cramps    7. Sleep disturbance     controlled on current medication regime, wll continue current pain medications to improve quality of life and function.    Will start gabapentin at bedtime for legs cramps  SOAPP- the score is 4 (Values indicates patient is <4minimal

## 2019-05-18 LAB
6-ACETYLMORPHINE, UR: NOT DETECTED
7-AMINOCLONAZEPAM, URINE: NOT DETECTED
ALPHA-OH-ALPRAZ, URINE: NOT DETECTED
ALPRAZOLAM, URINE: NOT DETECTED
AMPHETAMINES, URINE: NOT DETECTED
BARBITURATES, URINE: NOT DETECTED
BENZOYLECGONINE, UR: NOT DETECTED
BUPRENORPHINE URINE: NOT DETECTED
CARISOPRODOL, UR: NOT DETECTED
CLONAZEPAM, URINE: NOT DETECTED
CODEINE, URINE: NOT DETECTED
CREATININE URINE: 128.6 MG/DL (ref 20–400)
DIAZEPAM, URINE: NOT DETECTED
EER PAIN MGT DRUG PANEL, HIGH RES/EMIT U: NORMAL
ETHYL GLUCURONIDE UR: NOT DETECTED
FENTANYL URINE: NOT DETECTED
HYDROCODONE, URINE: PRESENT
HYDROMORPHONE, URINE: NOT DETECTED
LORAZEPAM, URINE: NOT DETECTED
MARIJUANA METAB, UR: NOT DETECTED
MDA, UR: NOT DETECTED
MDEA, EVE, UR: NOT DETECTED
MDMA URINE: NOT DETECTED
MEPERIDINE METAB, UR: NOT DETECTED
METHADONE, URINE: NOT DETECTED
METHAMPHETAMINE, URINE: NOT DETECTED
METHYLPHENIDATE: NOT DETECTED
MIDAZOLAM, URINE: NOT DETECTED
MORPHINE URINE: NOT DETECTED
NORBUPRENORPHINE, URINE: NOT DETECTED
NORDIAZEPAM, URINE: NOT DETECTED
NORFENTANYL, URINE: NOT DETECTED
NORHYDROCODONE, URINE: PRESENT
NOROXYCODONE, URINE: PRESENT
NOROXYMORPHONE, URINE: NOT DETECTED
OXAZEPAM, URINE: NOT DETECTED
OXYCODONE URINE: PRESENT
OXYMORPHONE, URINE: NOT DETECTED
PAIN MGT DRUG PANEL, HI RES, UR: NORMAL
PCP,URINE: NOT DETECTED
PHENTERMINE, UR: NOT DETECTED
PROPOXYPHENE, URINE: NOT DETECTED
TAPENTADOL, URINE: NOT DETECTED
TAPENTADOL-O-SULFATE, URINE: NOT DETECTED
TEMAZEPAM, URINE: NOT DETECTED
TRAMADOL, URINE: NOT DETECTED
ZOLPIDEM, URINE: NOT DETECTED

## 2019-05-25 ENCOUNTER — HOSPITAL ENCOUNTER (OUTPATIENT)
Age: 64
Discharge: HOME | End: 2019-05-27
Payer: MEDICARE

## 2019-05-25 ENCOUNTER — HOSPITAL ENCOUNTER (OUTPATIENT)
Dept: GENERAL RADIOLOGY | Age: 64
Discharge: HOME | End: 2019-05-27
Payer: MEDICARE

## 2019-05-25 ENCOUNTER — OFFICE VISIT (OUTPATIENT)
Dept: PRIMARY CARE CLINIC | Age: 64
End: 2019-05-25
Payer: MEDICARE

## 2019-05-25 VITALS
RESPIRATION RATE: 18 BRPM | HEART RATE: 71 BPM | OXYGEN SATURATION: 98 % | BODY MASS INDEX: 30.23 KG/M2 | DIASTOLIC BLOOD PRESSURE: 70 MMHG | SYSTOLIC BLOOD PRESSURE: 132 MMHG | WEIGHT: 223.2 LBS | TEMPERATURE: 98.1 F | HEIGHT: 72 IN

## 2019-05-25 DIAGNOSIS — S60.222A CONTUSION OF LEFT HAND, INITIAL ENCOUNTER: ICD-10-CM

## 2019-05-25 DIAGNOSIS — M79.642 HAND PAIN, LEFT: ICD-10-CM

## 2019-05-25 DIAGNOSIS — S60.222A TRAUMATIC HEMATOMA OF LEFT HAND, INITIAL ENCOUNTER: Primary | ICD-10-CM

## 2019-05-25 PROCEDURE — 3017F COLORECTAL CA SCREEN DOC REV: CPT | Performed by: NURSE PRACTITIONER

## 2019-05-25 PROCEDURE — G8427 DOCREV CUR MEDS BY ELIG CLIN: HCPCS | Performed by: NURSE PRACTITIONER

## 2019-05-25 PROCEDURE — 73130 X-RAY EXAM OF HAND: CPT

## 2019-05-25 PROCEDURE — 4004F PT TOBACCO SCREEN RCVD TLK: CPT | Performed by: NURSE PRACTITIONER

## 2019-05-25 PROCEDURE — G8417 CALC BMI ABV UP PARAM F/U: HCPCS | Performed by: NURSE PRACTITIONER

## 2019-05-25 PROCEDURE — 99214 OFFICE O/P EST MOD 30 MIN: CPT | Performed by: NURSE PRACTITIONER

## 2019-05-25 ASSESSMENT — PATIENT HEALTH QUESTIONNAIRE - PHQ9
SUM OF ALL RESPONSES TO PHQ9 QUESTIONS 1 & 2: 0
SUM OF ALL RESPONSES TO PHQ QUESTIONS 1-9: 0
2. FEELING DOWN, DEPRESSED OR HOPELESS: 0
SUM OF ALL RESPONSES TO PHQ QUESTIONS 1-9: 0
1. LITTLE INTEREST OR PLEASURE IN DOING THINGS: 0

## 2019-05-25 ASSESSMENT — ENCOUNTER SYMPTOMS: RESPIRATORY NEGATIVE: 1

## 2019-05-25 NOTE — PROGRESS NOTES
[facility]             [address], [address]                        Telephone [phone]             Fax [phone]     Aliyah Finley  1955  VOV:[id number]   Date of visit:  5/25/2019    Subjective:     Aliyah Finley is a 59 y.o.  male who presents to [facility] today (5/25/2019) for evaluation of:    Chief Complaint   Patient presents with    Hand Pain     left hand pain s/p getting crushed between car & dog today. Hand Pain    The incident occurred less than 1 hour ago. The incident occurred at home. Injury mechanism: left hand crushed between car fender and dog's head. The pain is present in the left hand. Quality: throbbing. The pain does not radiate. The pain is at a severity of 6/10. The pain has been constant since the incident. Pertinent negatives include no chest pain, numbness or tingling. Associated symptoms comments: Swelling medial and lateral aspects of top of hand. He has tried nothing for the symptoms. The treatment provided no relief. He has the following problem list:  Patient Active Problem List   Diagnosis    Chronic left-sided low back pain with left-sided sciatica    Chronic cervical pain    Neuropathy    Chronic myofascial pain    Osteoarthritis of left knee    Opiate analgesic use agreement exists    Encounter for drug screening    Chronic pain of left knee    Encounter for long-term opiate analgesic use        Current medications are:  Current Outpatient Medications   Medication Sig Dispense Refill    diphenhydrAMINE (BENADRYL ALLERGY) 25 MG capsule Take 25 mg by mouth every 6 hours as needed for Itching      gabapentin (NEURONTIN) 300 MG capsule Take 1 capsule by mouth nightly for 30 days. Intended supply: 90 days 30 capsule 1    oxyCODONE (OXYCONTIN) 30 MG T12A extended release tablet Take 30 mg by mouth every 12 hours for 30 days.  60 each 0  HYDROcodone-acetaminophen (NORCO)  MG per tablet Take 1 tablet by mouth every 6 hours as needed for Pain for up to 30 days. 120 tablet 0    aspirin 81 MG chewable tablet Take 81 mg by mouth      levothyroxine (SYNTHROID) 150 MCG tablet Take 150 mcg by mouth every other day       dexlansoprazole (DEXILANT) 60 MG CPDR capsule Take 60 mg by mouth every morning       pravastatin (PRAVACHOL) 40 MG tablet Take 40 mg by mouth daily.  albuterol (PROVENTIL HFA;VENTOLIN HFA) 108 (90 BASE) MCG/ACT inhaler Inhale 2 puffs into the lungs every 6 hours as needed for Wheezing.  warfarin (COUMADIN) 5 MG tablet Take 5 mg by mouth daily Indications: the patient takes 1.5 tabs of 5mg tablet for total of 7.5mg       diclofenac sodium (VOLTAREN) 1 % GEL Apply 2 g topically 4 times daily as needed 5 Tube 11     No current facility-administered medications for this visit. He is allergic to penicillins; mucinex [guaifenesin er]; cephalexin; and adhesive tape. Darren Hogan He  reports that he has been smoking cigarettes. He started smoking about 48 years ago. He has a 23.00 pack-year smoking history. He has never used smokeless tobacco.      Objective:    Vitals:    05/25/19 1625   BP: 132/70   Pulse: 71   Resp: 18   Temp: 98.1 °F (36.7 °C)   SpO2: 98%     Body mass index is 30.27 kg/m². Review of Systems   Constitutional: Negative. Respiratory: Negative. Cardiovascular: Negative. Negative for chest pain. Musculoskeletal:        Left hand pain and swelling   Neurological: Negative for tingling and numbness. Physical Exam   Constitutional: He is oriented to person, place, and time. He appears well-developed and well-nourished. HENT:   Head: Normocephalic. Eyes: Pupils are equal, round, and reactive to light. Neck: Normal range of motion. Neck supple. Cardiovascular: Normal rate, regular rhythm and normal heart sounds.    Pulmonary/Chest: Effort normal and breath sounds normal. Musculoskeletal:        Left hand: He exhibits decreased range of motion (increased pain with grasping) and swelling. He exhibits normal capillary refill. Normal sensation noted. Normal strength noted. Hands:  Neurological: He is alert and oriented to person, place, and time. Skin: Skin is warm and dry. Psychiatric: He has a normal mood and affect. His behavior is normal. Thought content normal.   Nursing note and vitals reviewed. Assessment and Plan:    Xr Hand Left (min 3 Views)    Result Date: 5/25/2019  EXAMINATION: 3 XRAY VIEWS OF THE LEFT HAND 5/25/2019 4:13 pm COMPARISON: None. HISTORY: ORDERING SYSTEM PROVIDED HISTORY: Hand pain, left TECHNOLOGIST PROVIDED HISTORY: hit hand against card Ordering Physician Provided Reason for Exam: Pt hit left hand against his car. Pt dorsal side of left hand is swollen and bruised near the 1st metacarpal and 5th metacarpal, pt is on blood thinners FINDINGS: There is no acute fracture or suspect osseous lesion. There is no joint subluxation or dislocation. There are mild degenerative changes of the 1st carpometacarpal joint. No focal soft tissue abnormality is evident. No acute osseous abnormality of the left hand. Diagnosis Orders   1. Traumatic hematoma of left hand, initial encounter     2. Contusion of left hand, initial encounter     3. Hand pain, left  XR HAND LEFT (MIN 3 VIEWS)     Take Tylenol as needed for pain. Apply a compressive ACE bandage. Rest and elevate the affected painful area. Apply cold compresses intermittently as needed. As pain recedes, begin normal activities slowly as tolerated. We discussed signs of infection. Follow up with PCP if symptoms do not improve or worsen.         Electronically signed by SAHARA Barba CNP on 5/25/19 at 4:32 PM

## 2019-05-25 NOTE — PATIENT INSTRUCTIONS
about moving and exercising your injured hand. When should you call for help? Call your doctor now or seek immediate medical care if:    · Your pain gets worse.     · You have new or worse swelling.     · You have tingling, weakness, or numbness in the area near the bruise.     · The area near the bruise is cold or pale.     · You have symptoms of infection, such as:  ? Increased pain, swelling, warmth, or redness. ? Red streaks leading from the area. ? Pus draining from the area. ? A fever.    Watch closely for changes in your health, and be sure to contact your doctor if:    · You do not get better as expected. Where can you learn more? Go to [email or web address] org and sign in to your "[employer]" account. Enter U931 in the [employer] box to learn more about \"Hand Bruises: Care Instructions. \"     If you do not have an account, please click on the \"Sign Up Now\" link. Current as of: September 23, 2018  Content Version: 12.0  © 9698-6826 [employer]. Care instructions adapted under license by [facility] ([facility]). If you have questions about a medical condition or this instruction, always ask your healthcare professional. [address] any warranty or liability for your use of this information.

## 2019-06-03 DIAGNOSIS — G89.29 CHRONIC BILATERAL LOW BACK PAIN WITH LEFT-SIDED SCIATICA: ICD-10-CM

## 2019-06-03 DIAGNOSIS — M17.12 PRIMARY OSTEOARTHRITIS OF LEFT KNEE: ICD-10-CM

## 2019-06-03 DIAGNOSIS — M54.42 CHRONIC BILATERAL LOW BACK PAIN WITH LEFT-SIDED SCIATICA: ICD-10-CM

## 2019-06-03 DIAGNOSIS — M54.2 CHRONIC CERVICAL PAIN: ICD-10-CM

## 2019-06-03 DIAGNOSIS — G89.29 CHRONIC CERVICAL PAIN: ICD-10-CM

## 2019-06-03 RX ORDER — HYDROCODONE BITARTRATE AND ACETAMINOPHEN 10; 325 MG/1; MG/1
1 TABLET ORAL EVERY 6 HOURS PRN
Qty: 120 TABLET | Refills: 0 | Status: SHIPPED | OUTPATIENT
Start: 2019-06-03 | End: 2019-06-26 | Stop reason: SDUPTHER

## 2019-06-03 RX ORDER — NALOXONE HYDROCHLORIDE 4 MG/.1ML
1 SPRAY NASAL PRN
Qty: 1 EACH | Refills: 5 | Status: SHIPPED | OUTPATIENT
Start: 2019-06-03 | End: 2021-01-05 | Stop reason: ALTCHOICE

## 2019-06-03 RX ORDER — OXYCODONE HYDROCHLORIDE 30 MG/1
30 TABLET, FILM COATED, EXTENDED RELEASE ORAL EVERY 12 HOURS
Qty: 60 EACH | Refills: 0 | Status: SHIPPED | OUTPATIENT
Start: 2019-06-03 | End: 2019-06-26 | Stop reason: SDUPTHER

## 2019-06-03 NOTE — TELEPHONE ENCOUNTER
The patient called the refill line requesting a refill of norco and oxycontin 30mg. OARRS Report checked for [place], and [place]: 5/4/19 norco 10-325mg #120. Due now. 5/4/19 oxycontin 30mg #60. Due now.     Last Appt:  5/15/2019  Next Appt:   6/26/2019  Med verified in Epic

## 2019-06-26 ENCOUNTER — OFFICE VISIT (OUTPATIENT)
Dept: PAIN MANAGEMENT | Age: 64
End: 2019-06-26
Payer: MEDICARE

## 2019-06-26 VITALS
SYSTOLIC BLOOD PRESSURE: 134 MMHG | BODY MASS INDEX: 29.96 KG/M2 | HEIGHT: 71 IN | DIASTOLIC BLOOD PRESSURE: 76 MMHG | HEART RATE: 78 BPM | WEIGHT: 214 LBS

## 2019-06-26 DIAGNOSIS — M54.2 CHRONIC CERVICAL PAIN: ICD-10-CM

## 2019-06-26 DIAGNOSIS — G89.29 CHRONIC MYOFASCIAL PAIN: ICD-10-CM

## 2019-06-26 DIAGNOSIS — M17.12 PRIMARY OSTEOARTHRITIS OF LEFT KNEE: ICD-10-CM

## 2019-06-26 DIAGNOSIS — M25.562 CHRONIC PAIN OF LEFT KNEE: ICD-10-CM

## 2019-06-26 DIAGNOSIS — G89.29 CHRONIC PAIN OF LEFT KNEE: ICD-10-CM

## 2019-06-26 DIAGNOSIS — G89.29 CHRONIC LEFT-SIDED LOW BACK PAIN WITH LEFT-SIDED SCIATICA: ICD-10-CM

## 2019-06-26 DIAGNOSIS — M54.42 CHRONIC BILATERAL LOW BACK PAIN WITH LEFT-SIDED SCIATICA: Primary | ICD-10-CM

## 2019-06-26 DIAGNOSIS — M54.42 CHRONIC LEFT-SIDED LOW BACK PAIN WITH LEFT-SIDED SCIATICA: ICD-10-CM

## 2019-06-26 DIAGNOSIS — M79.18 CHRONIC MYOFASCIAL PAIN: ICD-10-CM

## 2019-06-26 DIAGNOSIS — G89.29 CHRONIC CERVICAL PAIN: ICD-10-CM

## 2019-06-26 DIAGNOSIS — G89.29 CHRONIC BILATERAL LOW BACK PAIN WITH LEFT-SIDED SCIATICA: Primary | ICD-10-CM

## 2019-06-26 PROCEDURE — 4004F PT TOBACCO SCREEN RCVD TLK: CPT | Performed by: NURSE PRACTITIONER

## 2019-06-26 PROCEDURE — 99214 OFFICE O/P EST MOD 30 MIN: CPT | Performed by: NURSE PRACTITIONER

## 2019-06-26 PROCEDURE — G8417 CALC BMI ABV UP PARAM F/U: HCPCS | Performed by: NURSE PRACTITIONER

## 2019-06-26 PROCEDURE — 3017F COLORECTAL CA SCREEN DOC REV: CPT | Performed by: NURSE PRACTITIONER

## 2019-06-26 PROCEDURE — G8427 DOCREV CUR MEDS BY ELIG CLIN: HCPCS | Performed by: NURSE PRACTITIONER

## 2019-06-26 RX ORDER — OXYCODONE HYDROCHLORIDE 30 MG/1
30 TABLET, FILM COATED, EXTENDED RELEASE ORAL EVERY 12 HOURS
Qty: 60 EACH | Refills: 0 | Status: SHIPPED | OUTPATIENT
Start: 2019-07-03 | End: 2019-07-01 | Stop reason: SDUPTHER

## 2019-06-26 RX ORDER — PREGABALIN 25 MG/1
25 CAPSULE ORAL NIGHTLY
Qty: 30 CAPSULE | Refills: 3 | Status: SHIPPED | OUTPATIENT
Start: 2019-06-26 | End: 2019-07-31

## 2019-06-26 RX ORDER — QUETIAPINE FUMARATE 50 MG/1
200 TABLET, EXTENDED RELEASE ORAL DAILY
Refills: 0 | COMMUNITY
Start: 2019-06-21 | End: 2020-06-12 | Stop reason: DRUGHIGH

## 2019-06-26 RX ORDER — HYDROCODONE BITARTRATE AND ACETAMINOPHEN 10; 325 MG/1; MG/1
1 TABLET ORAL EVERY 6 HOURS PRN
Qty: 120 TABLET | Refills: 0 | Status: SHIPPED | OUTPATIENT
Start: 2019-07-03 | End: 2019-07-01 | Stop reason: SDUPTHER

## 2019-06-26 RX ORDER — VORTIOXETINE 10 MG/1
TABLET, FILM COATED ORAL
Refills: 0 | COMMUNITY
Start: 2019-06-21 | End: 2020-06-12 | Stop reason: DRUGHIGH

## 2019-06-26 ASSESSMENT — ENCOUNTER SYMPTOMS
CONSTIPATION: 0
BACK PAIN: 1
SHORTNESS OF BREATH: 0
EYES NEGATIVE: 1

## 2019-06-26 NOTE — PROGRESS NOTES
Subjective:      Patient ID: Maine Meier is a 59 y.o. male. Chief Complaint   Patient presents with    Lower Back Pain     pt states no change in pain and had to go off medication due to side effects with his depression. HPI   Here today for routine pain clinic recheck, medication management. Pain reduced on current medications, denies side effects, maintaining ADL's. Still working part time, some days increased pain. Leg cramps at night, interfering with sleep-last visit prescribed gabapentin, developed severe depression, admitted to psych, started on seroquel and trintellix. Off gabapentin, although it did help with leg cramps    ADVERSE MEDICATION EFFECTS:   Nausea and vomiting: no   Constipation: no-Undercontrol-: no  Dizziness/drowsy/sleepy--not applicable  Urinary Retention: no    ACTIVITY/SOCIAL/EMOTIONAL:  Sleep Pattern: 6 hours per night.  generally restful sleep  Home Exercises: daily walking  Activity:unchanged  Emotional Issues: normal.   Currently seeing a Psychiatrist or Psychologist:  No      ABERRANT BEHAVIORS SINCE LAST VISIT  Lost rx/pills:------------------------------------------ no  Taking  medication as prescribed: ----------- no  Urine Drug Screen ---------------------------------  yes  Recent ER visits: -------------------------------------No  Pill count is appropriate: ---------------------------not applicable   Refills for prescriptions appropriate:---------- yes      Pain Assessment  Location of Pain: Back  Location Modifiers: Left, Right, Inferior  Severity of Pain: 7  Quality of Pain: Sharp, Aching  Duration of Pain: Persistent  Frequency of Pain: Constant  Aggravating Factors: Bending, Stretching, Exercise, Walking, Squatting, Standing, Straightening, Kneeling  Limiting Behavior: Yes  Relieving Factors: Rest, Other (Comment)(medications and gel)  Result of Injury: No  Work-Related Injury: No  Are there other pain locations you wish to document?: No    Allergies  Hyperlipidemia     Hypothyroidism     Other pulmonary embolism and infarction     Tobacco use disorder     WPW syndrome        Past Surgical History:   Procedure Laterality Date    CARDIAC CATHETERIZATION  2013    FINGER SURGERY Right 2006    index finger flexor tendon repair    FINGER SURGERY Left 2014    KNEE ARTHROSCOPY Left 2012    LUNG BIOPSY Left 2/4/2015    benign results    OTHER SURGICAL HISTORY  7/9/2014    L4/L5    SINUS SURGERY  11/04/2017    Dr. Robby Xiao TISSUE BIOPSY  2014, 2010    THROAT SURGERY  5/3/16    Abdominal fat used to repair vocal cords    TONSILLECTOMY         Family History   Problem Relation Age of Onset    Heart Disease Mother     Cancer Father         lung       Social History     Socioeconomic History    Marital status:      Spouse name: None    Number of children: None    Years of education: None    Highest education level: None   Occupational History    None   Social Needs    Financial resource strain: None    Food insecurity:     Worry: None     Inability: None    Transportation needs:     Medical: None     Non-medical: None   Tobacco Use    Smoking status: Current Every Day Smoker     Packs/day: 0.50     Years: 46.00     Pack years: 23.00     Types: Cigarettes     Start date: 1971    Smokeless tobacco: Never Used    Tobacco comment: Pt down to 1/2 ppd from 3 ppd, attempting to quit.    Substance and Sexual Activity    Alcohol use: No    Drug use: No    Sexual activity: None   Lifestyle    Physical activity:     Days per week: None     Minutes per session: None    Stress: None   Relationships    Social connections:     Talks on phone: None     Gets together: None     Attends [employer] service: None     Active member of club or organization: None     Attends meetings of clubs or organizations: None     Relationship status: None    Intimate partner violence:     Fear of current or ex partner: None     Emotionally abused: None Physically abused: None     Forced sexual activity: None   Other Topics Concern    None   Social History Narrative    None     Review of Systems   Constitutional: Positive for fatigue. HENT: Negative. Eyes: Negative. Respiratory: Negative for shortness of breath. Cardiovascular: Negative for chest pain. Gastrointestinal: Negative for constipation. Musculoskeletal: Positive for arthralgias, back pain, myalgias and neck pain. Skin: Negative. Allergic/Immunologic: Negative for environmental allergies and food allergies. Neurological: Positive for weakness and numbness. Psychiatric/Behavioral: Positive for sleep disturbance. Objective:   Physical Exam   Constitutional: He is oriented to person, place, and time. He appears well-developed and well-nourished. He is active and cooperative. Non-toxic appearance. He does not have a sickly appearance. He does not appear ill. No distress. HENT:   Head: Normocephalic and atraumatic. Mouth/Throat: Oropharynx is clear and moist and mucous membranes are normal.   Cardiovascular: Normal rate, intact distal pulses and normal pulses. Warm extremities. Normal capillary refill. Pulmonary/Chest: Effort normal.   Regular respiratory rate. No acute dyspnea. No audible wheezing. Neurological: He is alert and oriented to person, place, and time. He has normal strength and normal reflexes. He displays no atrophy. No cranial nerve deficit or sensory deficit. He exhibits normal muscle tone. Skin: Skin is warm, dry and intact. He is not diaphoretic. No pallor. Psychiatric: He has a normal mood and affect. His speech is normal and behavior is normal. Judgment normal. His affect is not angry. He is not agitated, not aggressive and not withdrawn. He does not express impulsivity or inappropriate judgment. Assessment:      1. Chronic bilateral low back pain with left-sided sciatica    2. Chronic cervical pain    3.  Primary osteoarthritis of left knee    4. Chronic left-sided low back pain with left-sided sciatica    5. Chronic myofascial pain    6. Chronic pain of left knee          Plan:     Chronic pain diagnoses such as   1. Chronic bilateral low back pain with left-sided sciatica    2. Chronic cervical pain    3. Primary osteoarthritis of left knee    4. Chronic left-sided low back pain with left-sided sciatica    5. Chronic myofascial pain    6. Chronic pain of left knee     controlled on current medication regime, wll continue current pain medications to improve quality of life and function. Remain off gabapentin, to start Lyrica 25mg at bedtime, if worsening depression, instructed to stop taking. SOAPP- the score is 4 (Values indicates patient is <4minimal potential 4-7 Moderate potential >7 High potential for drug addiction)    Controlled Substances Monitoring:     RX Monitoring 6/26/2019   Attestation -   Periodic Controlled Substance Monitoring No signs of potential drug abuse or diversion identified.;Obtaining appropriate analgesic effect of treatment. Chronic Pain > 50 MEDD Obtained or confirmed \"Consent for Opioid Use\" on file. Chronic Pain > 80 MEDD Obtained or confirmed \"Medication Contract\" on file. ;Co-prescribed Naloxone.    Chronic Pain > 120 MEDD (historical values) -           Follow up one month

## 2019-07-01 DIAGNOSIS — M54.42 CHRONIC BILATERAL LOW BACK PAIN WITH LEFT-SIDED SCIATICA: ICD-10-CM

## 2019-07-01 DIAGNOSIS — G89.29 CHRONIC CERVICAL PAIN: ICD-10-CM

## 2019-07-01 DIAGNOSIS — G89.29 CHRONIC BILATERAL LOW BACK PAIN WITH LEFT-SIDED SCIATICA: ICD-10-CM

## 2019-07-01 DIAGNOSIS — M54.2 CHRONIC CERVICAL PAIN: ICD-10-CM

## 2019-07-01 DIAGNOSIS — M17.12 PRIMARY OSTEOARTHRITIS OF LEFT KNEE: ICD-10-CM

## 2019-07-01 RX ORDER — OXYCODONE HYDROCHLORIDE 30 MG/1
30 TABLET, FILM COATED, EXTENDED RELEASE ORAL EVERY 12 HOURS
Qty: 60 EACH | Refills: 0 | Status: SHIPPED | OUTPATIENT
Start: 2019-07-03 | End: 2019-07-23 | Stop reason: SDUPTHER

## 2019-07-01 RX ORDER — HYDROCODONE BITARTRATE AND ACETAMINOPHEN 10; 325 MG/1; MG/1
1 TABLET ORAL EVERY 6 HOURS PRN
Qty: 120 TABLET | Refills: 0 | Status: SHIPPED | OUTPATIENT
Start: 2019-07-03 | End: 2019-07-23 | Stop reason: SDUPTHER

## 2019-07-01 NOTE — TELEPHONE ENCOUNTER
Last Appt:  6/26/2019  Next Appt:   7/31/2019  Med verified in [address] checked for [place], [place], and [place]: 6/3/19 Oxycontin 30 mg #60. Due 7/3/19. [place] Report checked for [place], [place], and [place]: 6/3/19 Norco  mg  #120. Due 7/3/19.     Please send to [facility]

## 2019-07-23 DIAGNOSIS — M54.2 CHRONIC CERVICAL PAIN: ICD-10-CM

## 2019-07-23 DIAGNOSIS — G89.29 CHRONIC BILATERAL LOW BACK PAIN WITH LEFT-SIDED SCIATICA: ICD-10-CM

## 2019-07-23 DIAGNOSIS — M54.42 CHRONIC BILATERAL LOW BACK PAIN WITH LEFT-SIDED SCIATICA: ICD-10-CM

## 2019-07-23 DIAGNOSIS — M17.12 PRIMARY OSTEOARTHRITIS OF LEFT KNEE: ICD-10-CM

## 2019-07-23 DIAGNOSIS — G89.29 CHRONIC CERVICAL PAIN: ICD-10-CM

## 2019-07-24 RX ORDER — HYDROCODONE BITARTRATE AND ACETAMINOPHEN 10; 325 MG/1; MG/1
1 TABLET ORAL EVERY 6 HOURS PRN
Qty: 120 TABLET | Refills: 0 | Status: SHIPPED | OUTPATIENT
Start: 2019-08-02 | End: 2019-08-28 | Stop reason: SDUPTHER

## 2019-07-24 RX ORDER — OXYCODONE HYDROCHLORIDE 30 MG/1
30 TABLET, FILM COATED, EXTENDED RELEASE ORAL EVERY 12 HOURS
Qty: 60 EACH | Refills: 0 | Status: SHIPPED | OUTPATIENT
Start: 2019-08-02 | End: 2019-08-28 | Stop reason: SDUPTHER

## 2019-07-31 ENCOUNTER — OFFICE VISIT (OUTPATIENT)
Dept: PAIN MANAGEMENT | Age: 64
End: 2019-07-31
Payer: MEDICARE

## 2019-07-31 VITALS
SYSTOLIC BLOOD PRESSURE: 148 MMHG | WEIGHT: 218 LBS | BODY MASS INDEX: 30.52 KG/M2 | HEIGHT: 71 IN | HEART RATE: 98 BPM | DIASTOLIC BLOOD PRESSURE: 88 MMHG

## 2019-07-31 DIAGNOSIS — G89.29 CHRONIC CERVICAL PAIN: ICD-10-CM

## 2019-07-31 DIAGNOSIS — G89.29 CHRONIC MYOFASCIAL PAIN: ICD-10-CM

## 2019-07-31 DIAGNOSIS — M79.18 CHRONIC MYOFASCIAL PAIN: ICD-10-CM

## 2019-07-31 DIAGNOSIS — G62.9 NEUROPATHY: ICD-10-CM

## 2019-07-31 DIAGNOSIS — G89.29 CHRONIC LEFT-SIDED LOW BACK PAIN WITH LEFT-SIDED SCIATICA: Primary | ICD-10-CM

## 2019-07-31 DIAGNOSIS — M25.562 CHRONIC PAIN OF LEFT KNEE: ICD-10-CM

## 2019-07-31 DIAGNOSIS — M54.2 CHRONIC CERVICAL PAIN: ICD-10-CM

## 2019-07-31 DIAGNOSIS — M54.42 CHRONIC LEFT-SIDED LOW BACK PAIN WITH LEFT-SIDED SCIATICA: Primary | ICD-10-CM

## 2019-07-31 DIAGNOSIS — G89.29 CHRONIC PAIN OF LEFT KNEE: ICD-10-CM

## 2019-07-31 PROCEDURE — G8417 CALC BMI ABV UP PARAM F/U: HCPCS | Performed by: NURSE PRACTITIONER

## 2019-07-31 PROCEDURE — G8427 DOCREV CUR MEDS BY ELIG CLIN: HCPCS | Performed by: NURSE PRACTITIONER

## 2019-07-31 PROCEDURE — 3017F COLORECTAL CA SCREEN DOC REV: CPT | Performed by: NURSE PRACTITIONER

## 2019-07-31 PROCEDURE — 99214 OFFICE O/P EST MOD 30 MIN: CPT | Performed by: NURSE PRACTITIONER

## 2019-07-31 PROCEDURE — 4004F PT TOBACCO SCREEN RCVD TLK: CPT | Performed by: NURSE PRACTITIONER

## 2019-07-31 ASSESSMENT — ENCOUNTER SYMPTOMS
BACK PAIN: 1
SHORTNESS OF BREATH: 0
EYES NEGATIVE: 1
CONSTIPATION: 0

## 2019-08-28 ENCOUNTER — OFFICE VISIT (OUTPATIENT)
Dept: PAIN MANAGEMENT | Age: 64
End: 2019-08-28
Payer: MEDICARE

## 2019-08-28 ENCOUNTER — TELEPHONE (OUTPATIENT)
Dept: PAIN MANAGEMENT | Age: 64
End: 2019-08-28

## 2019-08-28 VITALS
WEIGHT: 217.8 LBS | SYSTOLIC BLOOD PRESSURE: 136 MMHG | DIASTOLIC BLOOD PRESSURE: 82 MMHG | HEART RATE: 72 BPM | BODY MASS INDEX: 30.49 KG/M2 | RESPIRATION RATE: 16 BRPM | HEIGHT: 71 IN

## 2019-08-28 DIAGNOSIS — M54.2 CHRONIC CERVICAL PAIN: ICD-10-CM

## 2019-08-28 DIAGNOSIS — M54.42 CHRONIC BILATERAL LOW BACK PAIN WITH LEFT-SIDED SCIATICA: Primary | ICD-10-CM

## 2019-08-28 DIAGNOSIS — M25.562 CHRONIC PAIN OF LEFT KNEE: ICD-10-CM

## 2019-08-28 DIAGNOSIS — M17.12 PRIMARY OSTEOARTHRITIS OF LEFT KNEE: ICD-10-CM

## 2019-08-28 DIAGNOSIS — G89.29 CHRONIC LEFT-SIDED LOW BACK PAIN WITH LEFT-SIDED SCIATICA: ICD-10-CM

## 2019-08-28 DIAGNOSIS — M79.18 CHRONIC MYOFASCIAL PAIN: ICD-10-CM

## 2019-08-28 DIAGNOSIS — M54.42 CHRONIC BILATERAL LOW BACK PAIN WITH LEFT-SIDED SCIATICA: ICD-10-CM

## 2019-08-28 DIAGNOSIS — G89.29 CHRONIC MYOFASCIAL PAIN: ICD-10-CM

## 2019-08-28 DIAGNOSIS — G89.29 CHRONIC CERVICAL PAIN: ICD-10-CM

## 2019-08-28 DIAGNOSIS — G89.29 CHRONIC BILATERAL LOW BACK PAIN WITH LEFT-SIDED SCIATICA: Primary | ICD-10-CM

## 2019-08-28 DIAGNOSIS — G89.29 CHRONIC BILATERAL LOW BACK PAIN WITH LEFT-SIDED SCIATICA: ICD-10-CM

## 2019-08-28 DIAGNOSIS — M54.42 CHRONIC LEFT-SIDED LOW BACK PAIN WITH LEFT-SIDED SCIATICA: ICD-10-CM

## 2019-08-28 DIAGNOSIS — G89.29 CHRONIC PAIN OF LEFT KNEE: ICD-10-CM

## 2019-08-28 PROCEDURE — G8417 CALC BMI ABV UP PARAM F/U: HCPCS | Performed by: NURSE PRACTITIONER

## 2019-08-28 PROCEDURE — 3017F COLORECTAL CA SCREEN DOC REV: CPT | Performed by: NURSE PRACTITIONER

## 2019-08-28 PROCEDURE — 4004F PT TOBACCO SCREEN RCVD TLK: CPT | Performed by: NURSE PRACTITIONER

## 2019-08-28 PROCEDURE — G8427 DOCREV CUR MEDS BY ELIG CLIN: HCPCS | Performed by: NURSE PRACTITIONER

## 2019-08-28 PROCEDURE — 99214 OFFICE O/P EST MOD 30 MIN: CPT | Performed by: NURSE PRACTITIONER

## 2019-08-28 RX ORDER — NALOXONE HYDROCHLORIDE 4 MG/.1ML
1 SPRAY NASAL PRN
Qty: 1 EACH | Refills: 5 | Status: SHIPPED | OUTPATIENT
Start: 2019-08-28 | End: 2019-10-29

## 2019-08-28 RX ORDER — NALOXONE HYDROCHLORIDE 4 MG/.1ML
1 SPRAY NASAL PRN
Qty: 1 EACH | Refills: 5 | Status: SHIPPED
Start: 2019-08-28 | End: 2020-03-19 | Stop reason: SDUPTHER

## 2019-08-28 RX ORDER — OXYCODONE HYDROCHLORIDE 30 MG/1
30 TABLET, FILM COATED, EXTENDED RELEASE ORAL EVERY 12 HOURS
Qty: 60 EACH | Refills: 0 | Status: SHIPPED | OUTPATIENT
Start: 2019-09-01 | End: 2019-08-28 | Stop reason: SDUPTHER

## 2019-08-28 RX ORDER — HYDROCODONE BITARTRATE AND ACETAMINOPHEN 10; 325 MG/1; MG/1
1 TABLET ORAL EVERY 6 HOURS PRN
Qty: 120 TABLET | Refills: 0 | Status: SHIPPED | OUTPATIENT
Start: 2019-09-01 | End: 2019-08-28 | Stop reason: SDUPTHER

## 2019-08-28 RX ORDER — OXYCODONE HYDROCHLORIDE 30 MG/1
30 TABLET, FILM COATED, EXTENDED RELEASE ORAL EVERY 12 HOURS
Qty: 60 EACH | Refills: 0 | Status: SHIPPED | OUTPATIENT
Start: 2019-08-31 | End: 2019-09-24 | Stop reason: SDUPTHER

## 2019-08-28 RX ORDER — HYDROCODONE BITARTRATE AND ACETAMINOPHEN 10; 325 MG/1; MG/1
1 TABLET ORAL EVERY 6 HOURS PRN
Qty: 120 TABLET | Refills: 0 | Status: SHIPPED | OUTPATIENT
Start: 2019-08-31 | End: 2019-09-24 | Stop reason: SDUPTHER

## 2019-08-28 ASSESSMENT — ENCOUNTER SYMPTOMS
BACK PAIN: 1
EYES NEGATIVE: 1
SHORTNESS OF BREATH: 0
CONSTIPATION: 0

## 2019-08-28 NOTE — PROGRESS NOTES
Cardiovascular: Negative for chest pain. Gastrointestinal: Negative for constipation. Musculoskeletal: Positive for arthralgias, back pain, myalgias and neck pain. Skin: Negative. Allergic/Immunologic: Negative for environmental allergies and food allergies. Neurological: Positive for weakness and numbness. Psychiatric/Behavioral: Positive for sleep disturbance. Objective:   Physical Exam   Constitutional: He is oriented to person, place, and time. He appears well-developed and well-nourished. He is active and cooperative. Non-toxic appearance. He does not have a sickly appearance. He does not appear ill. No distress. HENT:   Head: Normocephalic and atraumatic. Mouth/Throat: Oropharynx is clear and moist and mucous membranes are normal.   Cardiovascular: Normal rate, intact distal pulses and normal pulses. Warm extremities. Normal capillary refill. Pulmonary/Chest: Effort normal.   Regular respiratory rate. No acute dyspnea. No audible wheezing. Neurological: He is alert and oriented to person, place, and time. He has normal strength and normal reflexes. He displays no atrophy. No cranial nerve deficit or sensory deficit. He exhibits normal muscle tone. Skin: Skin is warm, dry and intact. He is not diaphoretic. No pallor. Psychiatric: He has a normal mood and affect. His speech is normal and behavior is normal. Judgment normal. His affect is not angry. He is not agitated, not aggressive and not withdrawn. He does not express impulsivity or inappropriate judgment. Assessment:      1. Chronic bilateral low back pain with left-sided sciatica    2. Chronic cervical pain    3. Primary osteoarthritis of left knee    4. Chronic left-sided low back pain with left-sided sciatica    5. Chronic pain of left knee    6. Chronic myofascial pain          Plan:     Chronic pain diagnoses such as   1. Chronic bilateral low back pain with left-sided sciatica    2. Chronic cervical pain    3.

## 2019-09-24 DIAGNOSIS — M54.42 CHRONIC BILATERAL LOW BACK PAIN WITH LEFT-SIDED SCIATICA: ICD-10-CM

## 2019-09-24 DIAGNOSIS — G89.29 CHRONIC CERVICAL PAIN: ICD-10-CM

## 2019-09-24 DIAGNOSIS — M17.12 PRIMARY OSTEOARTHRITIS OF LEFT KNEE: ICD-10-CM

## 2019-09-24 DIAGNOSIS — M54.2 CHRONIC CERVICAL PAIN: ICD-10-CM

## 2019-09-24 DIAGNOSIS — G89.29 CHRONIC BILATERAL LOW BACK PAIN WITH LEFT-SIDED SCIATICA: ICD-10-CM

## 2019-09-26 RX ORDER — OXYCODONE HYDROCHLORIDE 30 MG/1
30 TABLET, FILM COATED, EXTENDED RELEASE ORAL EVERY 12 HOURS
Qty: 60 EACH | Refills: 0 | Status: SHIPPED | OUTPATIENT
Start: 2019-10-01 | End: 2019-10-28 | Stop reason: SDUPTHER

## 2019-09-26 RX ORDER — HYDROCODONE BITARTRATE AND ACETAMINOPHEN 10; 325 MG/1; MG/1
1 TABLET ORAL EVERY 6 HOURS PRN
Qty: 120 TABLET | Refills: 0 | Status: SHIPPED | OUTPATIENT
Start: 2019-10-01 | End: 2019-10-28 | Stop reason: SDUPTHER

## 2019-10-28 DIAGNOSIS — G89.29 CHRONIC CERVICAL PAIN: ICD-10-CM

## 2019-10-28 DIAGNOSIS — M54.2 CHRONIC CERVICAL PAIN: ICD-10-CM

## 2019-10-28 DIAGNOSIS — M17.12 PRIMARY OSTEOARTHRITIS OF LEFT KNEE: ICD-10-CM

## 2019-10-28 DIAGNOSIS — G89.29 CHRONIC BILATERAL LOW BACK PAIN WITH LEFT-SIDED SCIATICA: ICD-10-CM

## 2019-10-28 DIAGNOSIS — M54.42 CHRONIC BILATERAL LOW BACK PAIN WITH LEFT-SIDED SCIATICA: ICD-10-CM

## 2019-10-28 RX ORDER — OXYCODONE HYDROCHLORIDE 30 MG/1
30 TABLET, FILM COATED, EXTENDED RELEASE ORAL EVERY 12 HOURS
Qty: 60 EACH | Refills: 0 | Status: SHIPPED | OUTPATIENT
Start: 2019-10-31 | End: 2019-11-29 | Stop reason: SDUPTHER

## 2019-10-28 RX ORDER — HYDROCODONE BITARTRATE AND ACETAMINOPHEN 10; 325 MG/1; MG/1
1 TABLET ORAL EVERY 6 HOURS PRN
Qty: 120 TABLET | Refills: 0 | Status: SHIPPED | OUTPATIENT
Start: 2019-10-31 | End: 2019-11-29 | Stop reason: SDUPTHER

## 2019-10-29 ENCOUNTER — OFFICE VISIT (OUTPATIENT)
Dept: PAIN MANAGEMENT | Age: 64
End: 2019-10-29
Payer: MEDICARE

## 2019-10-29 VITALS
SYSTOLIC BLOOD PRESSURE: 130 MMHG | DIASTOLIC BLOOD PRESSURE: 84 MMHG | HEART RATE: 70 BPM | HEIGHT: 72 IN | RESPIRATION RATE: 16 BRPM | BODY MASS INDEX: 29.5 KG/M2 | WEIGHT: 217.81 LBS

## 2019-10-29 DIAGNOSIS — G89.29 CHRONIC PAIN OF LEFT KNEE: ICD-10-CM

## 2019-10-29 DIAGNOSIS — M79.18 CHRONIC MYOFASCIAL PAIN: ICD-10-CM

## 2019-10-29 DIAGNOSIS — M54.42 CHRONIC LEFT-SIDED LOW BACK PAIN WITH LEFT-SIDED SCIATICA: Primary | ICD-10-CM

## 2019-10-29 DIAGNOSIS — M25.562 CHRONIC PAIN OF LEFT KNEE: ICD-10-CM

## 2019-10-29 DIAGNOSIS — G89.29 CHRONIC LEFT-SIDED LOW BACK PAIN WITH LEFT-SIDED SCIATICA: Primary | ICD-10-CM

## 2019-10-29 DIAGNOSIS — G89.29 CHRONIC MYOFASCIAL PAIN: ICD-10-CM

## 2019-10-29 PROCEDURE — G8417 CALC BMI ABV UP PARAM F/U: HCPCS | Performed by: NURSE PRACTITIONER

## 2019-10-29 PROCEDURE — G8484 FLU IMMUNIZE NO ADMIN: HCPCS | Performed by: NURSE PRACTITIONER

## 2019-10-29 PROCEDURE — 3017F COLORECTAL CA SCREEN DOC REV: CPT | Performed by: NURSE PRACTITIONER

## 2019-10-29 PROCEDURE — G8427 DOCREV CUR MEDS BY ELIG CLIN: HCPCS | Performed by: NURSE PRACTITIONER

## 2019-10-29 PROCEDURE — 4004F PT TOBACCO SCREEN RCVD TLK: CPT | Performed by: NURSE PRACTITIONER

## 2019-10-29 PROCEDURE — 99214 OFFICE O/P EST MOD 30 MIN: CPT | Performed by: NURSE PRACTITIONER

## 2019-10-29 ASSESSMENT — ENCOUNTER SYMPTOMS
EYES NEGATIVE: 1
SHORTNESS OF BREATH: 0
BACK PAIN: 1
CONSTIPATION: 0

## 2019-12-26 DIAGNOSIS — G89.29 CHRONIC BILATERAL LOW BACK PAIN WITH LEFT-SIDED SCIATICA: ICD-10-CM

## 2019-12-26 DIAGNOSIS — M54.42 CHRONIC BILATERAL LOW BACK PAIN WITH LEFT-SIDED SCIATICA: ICD-10-CM

## 2019-12-26 DIAGNOSIS — G89.29 CHRONIC CERVICAL PAIN: ICD-10-CM

## 2019-12-26 DIAGNOSIS — M17.12 PRIMARY OSTEOARTHRITIS OF LEFT KNEE: ICD-10-CM

## 2019-12-26 DIAGNOSIS — M54.2 CHRONIC CERVICAL PAIN: ICD-10-CM

## 2019-12-26 RX ORDER — OXYCODONE HYDROCHLORIDE 30 MG/1
30 TABLET, FILM COATED, EXTENDED RELEASE ORAL EVERY 12 HOURS
Qty: 60 EACH | Refills: 0 | Status: SHIPPED | OUTPATIENT
Start: 2019-12-31 | End: 2020-01-27 | Stop reason: SDUPTHER

## 2019-12-26 RX ORDER — HYDROCODONE BITARTRATE AND ACETAMINOPHEN 10; 325 MG/1; MG/1
1 TABLET ORAL EVERY 6 HOURS PRN
Qty: 120 TABLET | Refills: 0 | Status: SHIPPED | OUTPATIENT
Start: 2019-12-31 | End: 2020-01-27 | Stop reason: SDUPTHER

## 2020-01-27 RX ORDER — OXYCODONE HYDROCHLORIDE 30 MG/1
30 TABLET, FILM COATED, EXTENDED RELEASE ORAL EVERY 12 HOURS
Qty: 60 EACH | Refills: 0 | Status: SHIPPED | OUTPATIENT
Start: 2020-01-30 | End: 2020-02-24 | Stop reason: SDUPTHER

## 2020-01-27 RX ORDER — HYDROCODONE BITARTRATE AND ACETAMINOPHEN 10; 325 MG/1; MG/1
1 TABLET ORAL EVERY 6 HOURS PRN
Qty: 120 TABLET | Refills: 0 | Status: SHIPPED | OUTPATIENT
Start: 2020-01-30 | End: 2020-02-24 | Stop reason: SDUPTHER

## 2020-01-27 NOTE — TELEPHONE ENCOUNTER
The patient called the refill line requesting a refill of norco and oxycontin. OAR[place] Report checked for [place], and [place]: 12/31/19 norco 10-325mg #120. Due 1/30/20.          12/31/19 oxycontin ER 30mg #60. Due 1/30/20.     Last Appt:  10/29/2019  Next Appt:   1/29/2020  Med verified in [facility] [address]

## 2020-01-29 ENCOUNTER — HOSPITAL ENCOUNTER (OUTPATIENT)
Age: 65
Setting detail: SPECIMEN
Discharge: HOME | End: 2020-01-29
Payer: MEDICARE

## 2020-01-29 ENCOUNTER — OFFICE VISIT (OUTPATIENT)
Dept: PAIN MANAGEMENT | Age: 65
End: 2020-01-29
Payer: MEDICARE

## 2020-01-29 VITALS
SYSTOLIC BLOOD PRESSURE: 136 MMHG | BODY MASS INDEX: 29.5 KG/M2 | HEART RATE: 76 BPM | DIASTOLIC BLOOD PRESSURE: 82 MMHG | WEIGHT: 217.81 LBS | HEIGHT: 72 IN | RESPIRATION RATE: 14 BRPM

## 2020-01-29 PROCEDURE — 80307 DRUG TEST PRSMV CHEM ANLYZR: CPT

## 2020-01-29 PROCEDURE — 99214 OFFICE O/P EST MOD 30 MIN: CPT | Performed by: NURSE PRACTITIONER

## 2020-01-29 PROCEDURE — 20610 DRAIN/INJ JOINT/BURSA W/O US: CPT | Performed by: NURSE PRACTITIONER

## 2020-01-29 PROCEDURE — 4004F PT TOBACCO SCREEN RCVD TLK: CPT | Performed by: NURSE PRACTITIONER

## 2020-01-29 PROCEDURE — G8427 DOCREV CUR MEDS BY ELIG CLIN: HCPCS | Performed by: NURSE PRACTITIONER

## 2020-01-29 PROCEDURE — G8417 CALC BMI ABV UP PARAM F/U: HCPCS | Performed by: NURSE PRACTITIONER

## 2020-01-29 PROCEDURE — G8484 FLU IMMUNIZE NO ADMIN: HCPCS | Performed by: NURSE PRACTITIONER

## 2020-01-29 PROCEDURE — 3017F COLORECTAL CA SCREEN DOC REV: CPT | Performed by: NURSE PRACTITIONER

## 2020-01-29 RX ORDER — TRIAMCINOLONE ACETONIDE 40 MG/ML
40 INJECTION, SUSPENSION INTRA-ARTICULAR; INTRAMUSCULAR ONCE
Status: COMPLETED | OUTPATIENT
Start: 2020-01-29 | End: 2020-01-29

## 2020-01-29 RX ADMIN — TRIAMCINOLONE ACETONIDE 40 MG: 40 INJECTION, SUSPENSION INTRA-ARTICULAR; INTRAMUSCULAR at 14:04

## 2020-01-29 ASSESSMENT — ENCOUNTER SYMPTOMS
EYES NEGATIVE: 1
SHORTNESS OF BREATH: 0
BACK PAIN: 1
CONSTIPATION: 0

## 2020-01-29 NOTE — PROGRESS NOTES
(See Comments)     Worsening depression    Gabapentin Other (See Comments)     Severe depression    Adhesive Tape Rash       Outpatient Medications Marked as Taking for the 1/29/20 encounter (Office Visit) with SAHARA Abreu CNP   Medication Sig Dispense Refill    [START ON 1/30/2020] HYDROcodone-acetaminophen (NORCO)  MG per tablet Take 1 tablet by mouth every 6 hours as needed for Pain for up to 30 days. 120 tablet 0    [START ON 1/30/2020] oxyCODONE (OXYCONTIN) 30 MG T12A extended release tablet Take 30 mg by mouth every 12 hours for 30 days. 60 each 0    naloxone 4 MG/0.1ML LIQD nasal spray 1 spray by Nasal route as needed for Opioid Reversal 1 each 5    diclofenac sodium (VOLTAREN) 1 % GEL Apply 2 g topically 4 times daily as needed for Pain 5 Tube 11    QUEtiapine (SEROQUEL XR) 50 MG extended release tablet Take 200 mg by mouth Daily  0    TRINTELLIX 10 MG TABS tablet take 1 tablet by mouth once daily  0    naloxone 4 MG/0.1ML LIQD nasal spray 1 spray by Nasal route as needed for Opioid Reversal 1 each 5    diphenhydrAMINE (BENADRYL ALLERGY) 25 MG capsule Take 25 mg by mouth every 6 hours as needed for Itching      aspirin 81 MG chewable tablet Take 81 mg by mouth      levothyroxine (SYNTHROID) 150 MCG tablet Take 100 mcg by mouth every other day       dexlansoprazole (DEXILANT) 60 MG CPDR capsule Take 60 mg by mouth every morning       pravastatin (PRAVACHOL) 40 MG tablet Take 40 mg by mouth daily.  albuterol (PROVENTIL HFA;VENTOLIN HFA) 108 (90 BASE) MCG/ACT inhaler Inhale 2 puffs into the lungs every 6 hours as needed for Wheezing.       warfarin (COUMADIN) 5 MG tablet Take 5 mg by mouth daily Indications: the patient takes 1.5 tabs of 5mg tablet for total of 7.5mg          Past Medical History:   Diagnosis Date    Backache, unspecified     Bladder cancer ([address]     Chronic pain of left knee     had supartz x 2 with no relief from [address]    COPD (chronic obstructive pulmonary disease) [address] [address]     Depression     Hyperlipidemia     Hypothyroidism     Other pulmonary embolism and infarction     Tobacco use disorder     WPW syndrome        Past Surgical History:   Procedure Laterality Date    CARDIAC CATHETERIZATION  2013    FINGER SURGERY Right 2006    index finger flexor tendon repair    FINGER SURGERY Left 2014    KNEE ARTHROSCOPY Left 2012    LUNG BIOPSY Left 2/4/2015    benign results    OTHER SURGICAL HISTORY  7/9/2014    L4/L5    SINUS SURGERY  11/04/2017    Dr. Rosemarie Durant TISSUE BIOPSY  2014, 2010    THROAT SURGERY  5/3/16    Abdominal fat used to repair vocal cords    TONSILLECTOMY         Family History   Problem Relation Age of Onset   Dino Tapia Cancer Father         lung    Heart Disease Mother        Social History     Socioeconomic History    Marital status:      Spouse name: None    Number of children: None    Years of education: None    Highest education level: None   Occupational History    None   Social Needs    Financial resource strain: None    Food insecurity:     Worry: None     Inability: None    Transportation needs:     Medical: None     Non-medical: None   Tobacco Use    Smoking status: Current Every Day Smoker     Packs/day: 0.50     Years: 46.00     Pack years: 23.00     Types: Cigarettes     Start date: 1971    Smokeless tobacco: Never Used    Tobacco comment: Pt down to 1/2 ppd from 3 ppd, attempting to quit.    Substance and Sexual Activity    Alcohol use: No    Drug use: No    Sexual activity: None   Lifestyle    Physical activity:     Days per week: None     Minutes per session: None    Stress: None   Relationships    Social connections:     Talks on phone: None     Gets together: None     Attends Mandaen service: None     Active member of club or organization: None     Attends meetings of clubs or organizations: None     Relationship status: None    Intimate partner violence:     Fear of analgesic use    4. Encounter for drug screening    5. Primary osteoarthritis of left knee          Plan:     Chronic pain diagnoses such as   1. Chronic left-sided low back pain with left-sided sciatica    2. Chronic pain of left knee    3. Encounter for long-term opiate analgesic use    4. Encounter for drug screening    5. Primary osteoarthritis of left knee     controlled on current medication regime, wll continue current pain medications to improve quality of life and function. Left knee injection given, please see separate note. Will also schedule euflexxa series  SOAPP- the score is 4 (Values indicates patient is <4minimal potential 4-7 Moderate potential >7 High potential for drug addiction)    Controlled Substances Monitoring:     RX Monitoring 10/29/2019   Attestation -   Periodic Controlled Substance Monitoring No signs of potential drug abuse or diversion identified.;Obtaining appropriate analgesic effect of treatment.    Chronic Pain > 50 MEDD -   Chronic Pain > 80 MEDD -   Chronic Pain > 120 MEDD (historical values) -   Chronic Pain > 120 MEDD -           Follow up two months

## 2020-02-01 LAB
6-ACETYLMORPHINE, UR: NOT DETECTED
7-AMINOCLONAZEPAM, URINE: NOT DETECTED
ALPHA-OH-ALPRAZ, URINE: NOT DETECTED
ALPRAZOLAM, URINE: NOT DETECTED
AMPHETAMINES, URINE: NOT DETECTED
BARBITURATES, URINE: NOT DETECTED
BENZOYLECGONINE, UR: NOT DETECTED
BUPRENORPHINE URINE: NOT DETECTED
CARISOPRODOL, UR: NOT DETECTED
CLONAZEPAM, URINE: NOT DETECTED
CODEINE, URINE: NOT DETECTED
CREATININE URINE: 46.5 MG/DL (ref 20–400)
DIAZEPAM, URINE: NOT DETECTED
EER PAIN MGT DRUG PANEL, HIGH RES/EMIT U: NORMAL
ETHYL GLUCURONIDE UR: NOT DETECTED
FENTANYL URINE: NOT DETECTED
HYDROCODONE, URINE: PRESENT
HYDROMORPHONE, URINE: NOT DETECTED
LORAZEPAM, URINE: NOT DETECTED
MARIJUANA METAB, UR: NOT DETECTED
MDA, UR: NOT DETECTED
MDEA, EVE, UR: NOT DETECTED
MDMA URINE: NOT DETECTED
MEPERIDINE METAB, UR: NOT DETECTED
METHADONE, URINE: NOT DETECTED
METHAMPHETAMINE, URINE: NOT DETECTED
METHYLPHENIDATE: NOT DETECTED
MIDAZOLAM, URINE: NOT DETECTED
MORPHINE URINE: NOT DETECTED
NORBUPRENORPHINE, URINE: NOT DETECTED
NORDIAZEPAM, URINE: NOT DETECTED
NORFENTANYL, URINE: NOT DETECTED
NORHYDROCODONE, URINE: PRESENT
NOROXYCODONE, URINE: PRESENT
NOROXYMORPHONE, URINE: NOT DETECTED
OXAZEPAM, URINE: NOT DETECTED
OXYCODONE URINE: PRESENT
OXYMORPHONE, URINE: NOT DETECTED
PAIN MGT DRUG PANEL, HI RES, UR: NORMAL
PCP,URINE: NOT DETECTED
PHENTERMINE, UR: NOT DETECTED
PROPOXYPHENE, URINE: NOT DETECTED
TAPENTADOL, URINE: NOT DETECTED
TAPENTADOL-O-SULFATE, URINE: NOT DETECTED
TEMAZEPAM, URINE: NOT DETECTED
TRAMADOL, URINE: NOT DETECTED
ZOLPIDEM, URINE: NOT DETECTED

## 2020-02-24 RX ORDER — HYDROCODONE BITARTRATE AND ACETAMINOPHEN 10; 325 MG/1; MG/1
1 TABLET ORAL EVERY 6 HOURS PRN
Qty: 120 TABLET | Refills: 0 | Status: SHIPPED | OUTPATIENT
Start: 2020-02-29 | End: 2020-03-23 | Stop reason: SDUPTHER

## 2020-02-24 RX ORDER — OXYCODONE HYDROCHLORIDE 30 MG/1
30 TABLET, FILM COATED, EXTENDED RELEASE ORAL EVERY 12 HOURS
Qty: 60 EACH | Refills: 0 | Status: SHIPPED | OUTPATIENT
Start: 2020-02-29 | End: 2020-03-23 | Stop reason: SDUPTHER

## 2020-02-24 NOTE — TELEPHONE ENCOUNTER
Last Appt:  1/29/2020  Next Appt:   3/4/2020  Med verified in [facility] checked for [place], [place], and [place]: 01/30/20 Oxycontin Er 30mg #60.  Due 02/29/20 01/30/20 [place] 10/325 #120 Due 02/29/20

## 2020-03-04 ENCOUNTER — PROCEDURE VISIT (OUTPATIENT)
Dept: PAIN MANAGEMENT | Age: 65
End: 2020-03-04
Payer: MEDICARE

## 2020-03-04 VITALS
WEIGHT: 216.05 LBS | BODY MASS INDEX: 29.26 KG/M2 | HEIGHT: 72 IN | SYSTOLIC BLOOD PRESSURE: 124 MMHG | HEART RATE: 74 BPM | DIASTOLIC BLOOD PRESSURE: 76 MMHG

## 2020-03-04 PROCEDURE — 20610 DRAIN/INJ JOINT/BURSA W/O US: CPT | Performed by: NURSE PRACTITIONER

## 2020-03-04 PROCEDURE — 99214 OFFICE O/P EST MOD 30 MIN: CPT | Performed by: NURSE PRACTITIONER

## 2020-03-04 RX ORDER — HYALURONATE SODIUM 10 MG/ML
20 SYRINGE (ML) INTRAARTICULAR ONCE
Status: COMPLETED | OUTPATIENT
Start: 2020-03-04 | End: 2020-03-04

## 2020-03-04 RX ADMIN — Medication 20 MG: at 09:07

## 2020-03-04 NOTE — PROGRESS NOTES
Jaimee Grullon  1955  3/4/20      PAIN MANAGEMENT CLINIC PROCEDURE NOTE    CHIEF COMPLAINT: This is a 62 y.o. male patient who presents to the Pain Management Clinic with a history of pain in the left knee(s). PRE-PROCEDURE DIAGNOSIS: Left knee osteoarthritis      POST-PROCEDURE DIAGNOSIS: same as pre-procedure diagnosis    Vitals:    03/04/20 0835   BP: 124/76   Pulse: 74       PROCEDURE:     The procedure was explained, including risks and benefits, and the patient has agreed to proceed. The injection site was marked on the patients skin. A large area around the injection site was cleaned with chlora-prep    VISCO SUPPLEMENTATION INJECTION  A 22G 1.5 inch needle was inserted into the left knee(s) using a anterolateral approach. Depth and direction of the needle were monitored at all times. The syringe was aspirated and was negative for heme. Then, 2 ml ofeuflexxa was injected into the joint. Left: Exp date: 23/13/20, Lot  # [id number]    The injection site was cleaned and dressed with a spot band aid. The patient tolerated the procedure well and with out complication The patient was instructed avoid heat and excessive activity for 24-48 hours.

## 2020-03-11 ENCOUNTER — PROCEDURE VISIT (OUTPATIENT)
Dept: PAIN MANAGEMENT | Age: 65
End: 2020-03-11
Payer: MEDICARE

## 2020-03-11 VITALS
BODY MASS INDEX: 33 KG/M2 | HEART RATE: 96 BPM | RESPIRATION RATE: 16 BRPM | WEIGHT: 243.6 LBS | DIASTOLIC BLOOD PRESSURE: 64 MMHG | HEIGHT: 72 IN | SYSTOLIC BLOOD PRESSURE: 120 MMHG

## 2020-03-11 PROCEDURE — 99214 OFFICE O/P EST MOD 30 MIN: CPT | Performed by: NURSE PRACTITIONER

## 2020-03-11 PROCEDURE — 20610 DRAIN/INJ JOINT/BURSA W/O US: CPT | Performed by: NURSE PRACTITIONER

## 2020-03-11 RX ORDER — HYALURONATE SODIUM 10 MG/ML
20 SYRINGE (ML) INTRAARTICULAR ONCE
Status: COMPLETED | OUTPATIENT
Start: 2020-03-11 | End: 2020-03-11

## 2020-03-11 RX ADMIN — Medication 20 MG: at 09:23

## 2020-03-11 NOTE — PROGRESS NOTES
Chung Pelayo  1955  3/4/20      PAIN MANAGEMENT CLINIC PROCEDURE NOTE    CHIEF COMPLAINT: This is a 62 y.o. male patient who presents to the Pain Management Clinic with a history of pain in the left knee(s). PRE-PROCEDURE DIAGNOSIS: Left knee osteoarthritis      POST-PROCEDURE DIAGNOSIS: same as pre-procedure diagnosis    Vitals:    03/11/20 0851   BP: 120/64   Pulse: 96   Resp: 16       PROCEDURE:     The procedure was explained, including risks and benefits, and the patient has agreed to proceed. The injection site was marked on the patients skin. A large area around the injection site was cleaned with chlora-prep    VISCO SUPPLEMENTATION INJECTION  A 22G 1.5 inch needle was inserted into the left knee(s) using a anterolateral approach. Depth and direction of the needle were monitored at all times. The syringe was aspirated and was negative for heme. Then, 2 ml ofeuflexxa was injected into the joint. Left: Exp date: 12/13/20, Lot  # [id number]    The injection site was cleaned and dressed with a spot band aid. The patient tolerated the procedure well and with out complication The patient was instructed avoid heat and excessive activity for 24-48 hours.

## 2020-03-19 ENCOUNTER — PROCEDURE VISIT (OUTPATIENT)
Dept: PAIN MANAGEMENT | Age: 65
End: 2020-03-19
Payer: MEDICARE

## 2020-03-19 VITALS — DIASTOLIC BLOOD PRESSURE: 80 MMHG | SYSTOLIC BLOOD PRESSURE: 120 MMHG

## 2020-03-19 PROCEDURE — 99213 OFFICE O/P EST LOW 20 MIN: CPT

## 2020-03-19 PROCEDURE — 20610 DRAIN/INJ JOINT/BURSA W/O US: CPT | Performed by: NURSE PRACTITIONER

## 2020-03-19 RX ORDER — HYALURONATE SODIUM 10 MG/ML
20 SYRINGE (ML) INTRAARTICULAR ONCE
Status: COMPLETED | OUTPATIENT
Start: 2020-03-19 | End: 2020-03-19

## 2020-03-19 RX ADMIN — Medication 20 MG: at 10:01

## 2020-03-19 NOTE — PROGRESS NOTES
Erica Sender  1955  3/4/20      PAIN MANAGEMENT CLINIC PROCEDURE NOTE    CHIEF COMPLAINT: This is a 62 y.o. male patient who presents to the Pain Management Clinic with a history of pain in the left knee(s). PRE-PROCEDURE DIAGNOSIS: Left knee osteoarthritis      POST-PROCEDURE DIAGNOSIS: same as pre-procedure diagnosis    Vitals:    03/19/20 0855   BP: 120/80       PROCEDURE:     The procedure was explained, including risks and benefits, and the patient has agreed to proceed. The injection site was marked on the patients skin. A large area around the injection site was cleaned with chlora-prep    VISCO SUPPLEMENTATION INJECTION  A 22G 1.5 inch needle was inserted into the left knee(s) using a anterolateral approach. Depth and direction of the needle were monitored at all times. The syringe was aspirated and was negative for heme. Then, 2 ml ofeuflexxa was injected into the joint. Left: Exp date: 12/13/20, Lot  # [id number]    The injection site was cleaned and dressed with a spot band aid. The patient tolerated the procedure well and with out complication The patient was instructed avoid heat and excessive activity for 24-48 hours.

## 2020-03-23 RX ORDER — HYDROCODONE BITARTRATE AND ACETAMINOPHEN 10; 325 MG/1; MG/1
1 TABLET ORAL EVERY 6 HOURS PRN
Qty: 120 TABLET | Refills: 0 | Status: SHIPPED | OUTPATIENT
Start: 2020-03-30 | End: 2020-04-23 | Stop reason: SDUPTHER

## 2020-03-23 RX ORDER — OXYCODONE HYDROCHLORIDE 30 MG/1
30 TABLET, FILM COATED, EXTENDED RELEASE ORAL EVERY 12 HOURS
Qty: 60 EACH | Refills: 0 | Status: SHIPPED | OUTPATIENT
Start: 2020-03-30 | End: 2020-04-23 | Stop reason: SDUPTHER

## 2020-04-23 RX ORDER — HYDROCODONE BITARTRATE AND ACETAMINOPHEN 10; 325 MG/1; MG/1
1 TABLET ORAL EVERY 6 HOURS PRN
Qty: 120 TABLET | Refills: 0 | Status: SHIPPED | OUTPATIENT
Start: 2020-04-29 | End: 2020-05-26 | Stop reason: SDUPTHER

## 2020-04-23 RX ORDER — OXYCODONE HYDROCHLORIDE 30 MG/1
30 TABLET, FILM COATED, EXTENDED RELEASE ORAL EVERY 12 HOURS
Qty: 60 EACH | Refills: 0 | Status: SHIPPED | OUTPATIENT
Start: 2020-04-29 | End: 2020-05-26 | Stop reason: SDUPTHER

## 2020-05-26 RX ORDER — OXYCODONE HYDROCHLORIDE 30 MG/1
30 TABLET, FILM COATED, EXTENDED RELEASE ORAL EVERY 12 HOURS
Qty: 60 EACH | Refills: 0 | Status: SHIPPED | OUTPATIENT
Start: 2020-05-29 | End: 2020-06-12 | Stop reason: SDUPTHER

## 2020-05-26 RX ORDER — HYDROCODONE BITARTRATE AND ACETAMINOPHEN 10; 325 MG/1; MG/1
1 TABLET ORAL EVERY 6 HOURS PRN
Qty: 120 TABLET | Refills: 0 | Status: SHIPPED | OUTPATIENT
Start: 2020-05-26 | End: 2020-06-12 | Stop reason: SDUPTHER

## 2020-06-12 ENCOUNTER — OFFICE VISIT (OUTPATIENT)
Dept: PAIN MANAGEMENT | Age: 65
End: 2020-06-12
Payer: MEDICARE

## 2020-06-12 VITALS
RESPIRATION RATE: 17 BRPM | HEIGHT: 72 IN | WEIGHT: 257.6 LBS | BODY MASS INDEX: 34.89 KG/M2 | SYSTOLIC BLOOD PRESSURE: 148 MMHG | DIASTOLIC BLOOD PRESSURE: 98 MMHG

## 2020-06-12 PROCEDURE — 20610 DRAIN/INJ JOINT/BURSA W/O US: CPT | Performed by: NURSE PRACTITIONER

## 2020-06-12 PROCEDURE — 3017F COLORECTAL CA SCREEN DOC REV: CPT | Performed by: NURSE PRACTITIONER

## 2020-06-12 PROCEDURE — 4004F PT TOBACCO SCREEN RCVD TLK: CPT | Performed by: NURSE PRACTITIONER

## 2020-06-12 PROCEDURE — 99214 OFFICE O/P EST MOD 30 MIN: CPT | Performed by: NURSE PRACTITIONER

## 2020-06-12 PROCEDURE — G8417 CALC BMI ABV UP PARAM F/U: HCPCS | Performed by: NURSE PRACTITIONER

## 2020-06-12 PROCEDURE — G8427 DOCREV CUR MEDS BY ELIG CLIN: HCPCS | Performed by: NURSE PRACTITIONER

## 2020-06-12 RX ORDER — BUDESONIDE AND FORMOTEROL FUMARATE DIHYDRATE 160; 4.5 UG/1; UG/1
2 AEROSOL RESPIRATORY (INHALATION) 2 TIMES DAILY
COMMUNITY
Start: 2020-03-05

## 2020-06-12 RX ORDER — NIFEDIPINE 30 MG/1
30 TABLET, EXTENDED RELEASE ORAL DAILY
COMMUNITY
Start: 2020-04-06 | End: 2021-11-04

## 2020-06-12 RX ORDER — HYDROCODONE BITARTRATE AND ACETAMINOPHEN 10; 325 MG/1; MG/1
1 TABLET ORAL EVERY 6 HOURS PRN
Qty: 120 TABLET | Refills: 0 | Status: SHIPPED | OUTPATIENT
Start: 2020-06-26 | End: 2020-07-27 | Stop reason: SDUPTHER

## 2020-06-12 RX ORDER — LORATADINE 10 MG/1
10 TABLET ORAL DAILY
COMMUNITY
Start: 2020-01-14 | End: 2021-01-05 | Stop reason: ALTCHOICE

## 2020-06-12 RX ORDER — VORTIOXETINE 20 MG/1
20 TABLET, FILM COATED ORAL DAILY
COMMUNITY
Start: 2020-05-04

## 2020-06-12 RX ORDER — LEVOTHYROXINE SODIUM 0.1 MG/1
100 TABLET ORAL DAILY
COMMUNITY
Start: 2020-04-20 | End: 2021-07-01 | Stop reason: DRUGHIGH

## 2020-06-12 RX ORDER — OXYCODONE HYDROCHLORIDE 30 MG/1
30 TABLET, FILM COATED, EXTENDED RELEASE ORAL EVERY 12 HOURS
Qty: 60 EACH | Refills: 0 | Status: SHIPPED | OUTPATIENT
Start: 2020-06-26 | End: 2020-07-26

## 2020-06-12 RX ORDER — TRIAMCINOLONE ACETONIDE 40 MG/ML
40 INJECTION, SUSPENSION INTRA-ARTICULAR; INTRAMUSCULAR ONCE
Status: COMPLETED | OUTPATIENT
Start: 2020-06-12 | End: 2020-06-12

## 2020-06-12 RX ORDER — PREDNISONE 10 MG/1
10 TABLET ORAL DAILY
COMMUNITY
Start: 2020-06-04 | End: 2021-01-05 | Stop reason: ALTCHOICE

## 2020-06-12 RX ORDER — QUETIAPINE 300 MG/1
300 TABLET, FILM COATED, EXTENDED RELEASE ORAL DAILY
COMMUNITY
Start: 2019-09-06

## 2020-06-12 RX ORDER — BENZONATATE 100 MG/1
CAPSULE ORAL PRN
COMMUNITY
Start: 2020-06-04

## 2020-06-12 RX ADMIN — TRIAMCINOLONE ACETONIDE 40 MG: 40 INJECTION, SUSPENSION INTRA-ARTICULAR; INTRAMUSCULAR at 15:08

## 2020-06-12 ASSESSMENT — ENCOUNTER SYMPTOMS
BACK PAIN: 1
EYES NEGATIVE: 1
CONSTIPATION: 0
SHORTNESS OF BREATH: 0

## 2020-06-12 NOTE — PROGRESS NOTES
Subjective:      Patient ID: Fam Arreaga is a 62 y.o. male. Chief Complaint   Patient presents with    Knee Pain     bilateral, left worse than right, left has been swollen for about a week; pt has been off balance with a recent fall on Monday    Insomnia     can not get comfortable to sleep    Other     previous blood clot behind that left knee; inr was good however, nothing lumby, hard or hot    Other     left leg feels like it is going to give out on him    Injections     was here for a series of three injections last month and it didn;t last at all for him     Knee Pain    Associated symptoms include numbness. Back Pain   Associated symptoms include numbness and weakness. Pertinent negatives include no chest pain. Other   Associated symptoms include arthralgias, fatigue, myalgias, neck pain, numbness and weakness. Pertinent negatives include no chest pain. Here today for routine pain clinic recheck, medication management    Pain reduced on current medications, denies side effects, maintaining ADL's. Increased left knee pain, swelling, stiffness, concern for fall, utilizes a cane at home. Euflexxa 3/2020 with relief. Fearful of scheduling knee replacement    ADVERSE MEDICATION EFFECTS:   Nausea and vomiting: no   Constipation: no-Undercontrol-: no  Dizziness/drowsy/sleepy--not applicable  Urinary Retention: no    ACTIVITY/SOCIAL/EMOTIONAL:  Sleep Pattern: 6 hours per night.  generally restful sleep  Home Exercises: daily walking  Activity:unchanged  Emotional Issues: normal.   Currently seeing a Psychiatrist or Psychologist:  No      ABERRANT BEHAVIORS SINCE LAST VISIT  Lost rx/pills:------------------------------------------ no  Taking  medication as prescribed: ----------- no  Urine Drug Screen ---------------------------------  yes  Recent ER visits: -------------------------------------No  Pill count is appropriate: ---------------------------not applicable   Refills for prescriptions

## 2020-06-26 RX ORDER — HYDROCODONE BITARTRATE AND ACETAMINOPHEN 10; 325 MG/1; MG/1
1 TABLET ORAL EVERY 6 HOURS PRN
Qty: 120 TABLET | Refills: 0 | Status: SHIPPED
Start: 2020-06-26 | End: 2020-07-27 | Stop reason: SDUPTHER

## 2020-06-26 RX ORDER — OXYCODONE HYDROCHLORIDE 30 MG/1
30 TABLET, FILM COATED, EXTENDED RELEASE ORAL EVERY 12 HOURS
Qty: 60 EACH | Refills: 0 | Status: SHIPPED | OUTPATIENT
Start: 2020-06-26 | End: 2020-07-27 | Stop reason: SDUPTHER

## 2020-07-27 RX ORDER — HYDROCODONE BITARTRATE AND ACETAMINOPHEN 10; 325 MG/1; MG/1
1 TABLET ORAL EVERY 6 HOURS PRN
Qty: 120 TABLET | Refills: 0 | Status: SHIPPED | OUTPATIENT
Start: 2020-07-27 | End: 2020-08-20 | Stop reason: SDUPTHER

## 2020-07-27 RX ORDER — OXYCODONE HYDROCHLORIDE 30 MG/1
30 TABLET, FILM COATED, EXTENDED RELEASE ORAL EVERY 12 HOURS
Qty: 60 EACH | Refills: 0 | Status: SHIPPED | OUTPATIENT
Start: 2020-07-27 | End: 2020-08-20 | Stop reason: SDUPTHER

## 2020-07-27 NOTE — TELEPHONE ENCOUNTER
Last Appt:  6/12/2020  Next Appt:   9/14/2020  Med verified in [facility] checked for [place], [place], and [place]: 06/26/20 Oxycontin Er 30mg #60. Due 07/26/20 06/26/20 [place] 10/325 #120 Due 07/26/20.       Patient did not give a 5 business refill request    Also called back on 07/24/20 to request voltaren gel, and to follow up on pain med refil

## 2020-08-20 NOTE — TELEPHONE ENCOUNTER
Requesting refills of oxycodone and hydrocodone to [facility].     Phone     Last Appt:  6/12/2020  Next Appt:   9/14/2020  Med verified in Epic

## 2020-08-24 RX ORDER — OXYCODONE HYDROCHLORIDE 30 MG/1
30 TABLET, FILM COATED, EXTENDED RELEASE ORAL EVERY 12 HOURS
Qty: 60 EACH | Refills: 0 | Status: SHIPPED | OUTPATIENT
Start: 2020-08-26 | End: 2020-09-14 | Stop reason: SDUPTHER

## 2020-08-24 RX ORDER — HYDROCODONE BITARTRATE AND ACETAMINOPHEN 10; 325 MG/1; MG/1
1 TABLET ORAL EVERY 6 HOURS PRN
Qty: 120 TABLET | Refills: 0 | Status: SHIPPED | OUTPATIENT
Start: 2020-08-26 | End: 2020-09-14 | Stop reason: SDUPTHER

## 2020-09-14 ENCOUNTER — OFFICE VISIT (OUTPATIENT)
Dept: PAIN MANAGEMENT | Age: 65
End: 2020-09-14
Payer: MEDICARE

## 2020-09-14 VITALS
WEIGHT: 256 LBS | HEART RATE: 77 BPM | TEMPERATURE: 96.8 F | OXYGEN SATURATION: 96 % | SYSTOLIC BLOOD PRESSURE: 128 MMHG | BODY MASS INDEX: 34.67 KG/M2 | HEIGHT: 72 IN | DIASTOLIC BLOOD PRESSURE: 80 MMHG

## 2020-09-14 PROCEDURE — 99214 OFFICE O/P EST MOD 30 MIN: CPT

## 2020-09-14 PROCEDURE — 99214 OFFICE O/P EST MOD 30 MIN: CPT | Performed by: NURSE PRACTITIONER

## 2020-09-14 RX ORDER — FLUTICASONE PROPIONATE 50 MCG
1 SPRAY, SUSPENSION (ML) NASAL 2 TIMES DAILY
COMMUNITY
Start: 2020-08-14

## 2020-09-14 RX ORDER — KETOROLAC TROMETHAMINE 4 MG/ML
SOLUTION/ DROPS OPHTHALMIC
COMMUNITY
Start: 2020-09-03 | End: 2021-11-22 | Stop reason: ALTCHOICE

## 2020-09-14 RX ORDER — TRAZODONE HYDROCHLORIDE 50 MG/1
TABLET ORAL
COMMUNITY
Start: 2020-08-25

## 2020-09-14 RX ORDER — HYDROCODONE BITARTRATE AND ACETAMINOPHEN 10; 325 MG/1; MG/1
1 TABLET ORAL EVERY 6 HOURS PRN
Qty: 120 TABLET | Refills: 0 | Status: SHIPPED | OUTPATIENT
Start: 2020-09-25 | End: 2020-10-15 | Stop reason: SDUPTHER

## 2020-09-14 RX ORDER — OXYCODONE HYDROCHLORIDE 30 MG/1
30 TABLET, FILM COATED, EXTENDED RELEASE ORAL EVERY 12 HOURS
Qty: 60 EACH | Refills: 0 | Status: SHIPPED | OUTPATIENT
Start: 2020-09-25 | End: 2020-10-15 | Stop reason: SDUPTHER

## 2020-09-14 RX ORDER — TAMSULOSIN HYDROCHLORIDE 0.4 MG/1
CAPSULE ORAL
COMMUNITY
Start: 2020-09-08

## 2020-09-14 RX ORDER — LEVOTHYROXINE SODIUM 0.1 MG/1
TABLET ORAL
COMMUNITY
Start: 2020-07-14 | End: 2021-01-05 | Stop reason: SDUPTHER

## 2020-09-14 ASSESSMENT — ENCOUNTER SYMPTOMS
CONSTIPATION: 0
BACK PAIN: 1
EYES NEGATIVE: 1
SHORTNESS OF BREATH: 0

## 2020-09-14 NOTE — PROGRESS NOTES
Subjective:      Patient ID: Genia Dueñas is a 60 y.o. male. Chief Complaint   Patient presents with    Knee Pain     Left    Lower Back Pain     radiate to left leg     Knee Pain    Associated symptoms include numbness. Other   Associated symptoms include arthralgias, fatigue, myalgias, neck pain, numbness and weakness. Pertinent negatives include no chest pain. Back Pain   Associated symptoms include numbness and weakness. Pertinent negatives include no chest pain. Here today for routine pain clinic recheck, medication management    Pain reduced on current medications, denies side effects, maintaining ADL's. Increased left knee pain, swelling, stiffness, concern for fall, utilizes a cane at home. Euflexxa 3/2020 with relief. Fearful of scheduling knee replacement    ADVERSE MEDICATION EFFECTS:   Nausea and vomiting: no   Constipation: no-Undercontrol-: no  Dizziness/drowsy/sleepy--not applicable  Urinary Retention: no    ACTIVITY/SOCIAL/EMOTIONAL:  Sleep Pattern: 6 hours per night. generally restful sleep  Home Exercises: daily walking  Activity:unchanged  Emotional Issues: normal.   Currently seeing a Psychiatrist or Psychologist:  No      ABERRANT BEHAVIORS SINCE LAST VISIT  Lost rx/pills:------------------------------------------ no  Taking  medication as prescribed: ----------- no  Urine Drug Screen ---------------------------------  yes  Recent ER visits: -------------------------------------No  Pill count is appropriate: ---------------------------not applicable   Refills for prescriptions appropriate:---------- yes      Pain Assessment  Location of Pain: Knee  Location Modifiers: Left  Severity of Pain:  6(with pain medication)  Quality of Pain: Throbbing, Sharp, Dull, Aching, Buckling  Duration of Pain: Persistent  Frequency of Pain: Constant  Aggravating Factors: Bending, Stretching, Straightening, Exercise, Kneeling, Squatting, Standing, Walking  Limiting Behavior: Yes  Relieving Factors: Heat, Ice(pain medication)  Result of Injury: No  Work-Related Injury: No  Are there other pain locations you wish to document?: Yes    Allergies   Allergen Reactions    Penicillins Shortness Of Breath    Mucinex [Guaifenesin Er] Diarrhea and Nausea And Vomiting    Cephalexin Hives    Chantix [Varenicline Tartrate] Other (See Comments)     Worsening depression    Gabapentin Other (See Comments)     Severe depression    Adhesive Tape Rash       Outpatient Medications Marked as Taking for the 9/14/20 encounter (Office Visit) with SAHARA Payne CNP   Medication Sig Dispense Refill    fluticasone (FLONASE) 50 MCG/ACT nasal spray 1 spray by Nasal route 2 times daily      ketorolac 0.4 % SOLN ophthalmic solution instill 1 drop into right eye three times a day      tamsulosin (FLOMAX) 0.4 MG capsule take 1 capsule by mouth at bedtime      traZODone (DESYREL) 50 MG tablet TAKE 1/2 TO 2  TABLETS BY MOUTH AT BEDTIME AS NEEDED FOR SLEEP      levothyroxine (SYNTHROID) 100 MCG tablet take 1 tablet by mouth every morning BEFORE BREAKFAST      oxyCODONE (OXYCONTIN) 30 MG T12A extended release tablet Take 30 mg by mouth every 12 hours for 30 days. 60 each 0    HYDROcodone-acetaminophen (NORCO)  MG per tablet Take 1 tablet by mouth every 6 hours as needed for Pain for up to 30 days.  120 tablet 0    diclofenac sodium (VOLTAREN) 1 % GEL Apply 2 g topically 4 times daily as needed for Pain 5 Tube 11    budesonide-formoterol (SYMBICORT) 160-4.5 MCG/ACT AERO Inhale 2 puffs into the lungs 2 times daily      benzonatate (TESSALON) 100 MG capsule as needed      loratadine (CLARITIN) 10 MG tablet Take 10 mg by mouth daily      NIFEdipine (PROCARDIA XL) 30 MG extended release tablet Take 30 mg by mouth daily      predniSONE (DELTASONE) 10 MG tablet Take 10 mg by mouth daily      levothyroxine (SYNTHROID) 100 MCG tablet Take 100 mcg by mouth daily      QUEtiapine (SEROQUEL XR) 300 MG extended release tablet Take 300 mg by mouth daily      TRINTELLIX 20 MG TABS tablet Take 20 mg by mouth daily      aspirin 81 MG chewable tablet Take 81 mg by mouth      dexlansoprazole (DEXILANT) 60 MG CPDR capsule Take 60 mg by mouth every morning       pravastatin (PRAVACHOL) 40 MG tablet Take 40 mg by mouth daily.  albuterol (PROVENTIL HFA;VENTOLIN HFA) 108 (90 BASE) MCG/ACT inhaler Inhale 2 puffs into the lungs every 6 hours as needed for Wheezing.       warfarin (COUMADIN) 5 MG tablet Take 5 mg by mouth daily Indications: the patient takes 1.5 tabs of 5mg tablet for total of 7.5mg          Past Medical History:   Diagnosis Date    Backache, unspecified     Bladder cancer ([address]     Chronic pain of left knee     had supartz x 2 with no relief from [address] COPD (chronic obstructive pulmonary disease) [facility] [address]     Depression     Eye problem 06/2019    bleeding and swelling behind Rt eye    Hyperlipidemia     Hypothyroidism     Other pulmonary embolism and infarction     Tobacco use disorder     WPW syndrome        Past Surgical History:   Procedure Laterality Date    CARDIAC CATHETERIZATION  2013    FINGER SURGERY Right 2006    index finger flexor tendon repair    FINGER SURGERY Left 2014    KNEE ARTHROSCOPY Left 2012    LUNG BIOPSY Left 2/4/2015    benign results    OTHER SURGICAL HISTORY  7/9/2014    L4/L5    SINUS SURGERY  11/04/2017    Dr. Tami Bloom TISSUE BIOPSY  2014, 2010    THROAT SURGERY  5/3/16    Abdominal fat used to repair vocal cords    TONSILLECTOMY         Family History   Problem Relation Age of Onset    Cancer Father         lung    Heart Disease Mother        Social History     Socioeconomic History    Marital status:      Spouse name: None    Number of children: None    Years of education: None    Highest education level: None   Occupational History    None   Social Needs    Financial resource strain: None    Food insecurity     Worry: None Inability: None    Transportation needs     Medical: None     Non-medical: None   Tobacco Use    Smoking status: Light Tobacco Smoker     Packs/day: 0.50     Years: 46.00     Pack years: 23.00     Types: Cigarettes     Start date:      Last attempt to quit: 2020     Years since quittin.5    Smokeless tobacco: Never Used    Tobacco comment: Pt down to 1/2 ppd from 3 ppd, attempting to quit. Substance and Sexual Activity    Alcohol use: No    Drug use: No    Sexual activity: None   Lifestyle    Physical activity     Days per week: None     Minutes per session: None    Stress: None   Relationships    Social connections     Talks on phone: None     Gets together: None     Attends [employer] service: None     Active member of club or organization: None     Attends meetings of clubs or organizations: None     Relationship status: None    Intimate partner violence     Fear of current or ex partner: None     Emotionally abused: None     Physically abused: None     Forced sexual activity: None   Other Topics Concern    None   Social History Narrative    None     Review of Systems   Constitutional: Positive for fatigue. HENT: Negative. Eyes: Negative. Respiratory: Negative for shortness of breath. Cardiovascular: Negative for chest pain. Gastrointestinal: Negative for constipation. Musculoskeletal: Positive for arthralgias, back pain, myalgias and neck pain. Skin: Negative. Allergic/Immunologic: Negative for environmental allergies and food allergies. Neurological: Positive for weakness and numbness. Psychiatric/Behavioral: Positive for sleep disturbance. Objective:   Physical Exam  Constitutional:       General: He is not in acute distress. Appearance: He is well-developed. He is not ill-appearing, toxic-appearing or diaphoretic. HENT:      Head: Normocephalic and atraumatic. Cardiovascular:      Rate and Rhythm: Normal rate. Pulses: Normal pulses. Comments: Warm extremities. Normal capillary refill. Pulmonary:      Effort: Pulmonary effort is normal.   Skin:     General: Skin is warm and dry. Coloration: Skin is not pale. Neurological:      Mental Status: He is alert and oriented to person, place, and time. Cranial Nerves: No cranial nerve deficit. Sensory: No sensory deficit. Motor: No atrophy or abnormal muscle tone. Deep Tendon Reflexes: Reflexes are normal and symmetric. Psychiatric:         Mood and Affect: Affect is not angry. Speech: Speech normal.         Behavior: Behavior normal. Behavior is not agitated, aggressive or withdrawn. Behavior is cooperative. Judgment: Judgment normal. Judgment is not impulsive or inappropriate. Assessment:      1. Primary osteoarthritis of left knee    2. Chronic pain of left knee    3. Chronic myofascial pain    4. Neuropathy          Plan:     Chronic pain diagnoses such as   1. Primary osteoarthritis of left knee    2. Chronic pain of left knee    3. Chronic myofascial pain    4. Neuropathy     controlled on current medication regime, wll continue current pain medications to improve quality of life and function. Schedule repeat euflexxa series    SOAPP- the score is 4 (Values indicates patient is <4minimal potential 4-7 Moderate potential >7 High potential for drug addiction)    Controlled Substances Monitoring:     RX Monitoring 9/14/2020   Attestation -   Periodic Controlled Substance Monitoring No signs of potential drug abuse or diversion identified.;Obtaining appropriate analgesic effect of treatment. ;Random urine drug screen sent today. Chronic Pain > 50 MEDD -   Chronic Pain > 80 MEDD Obtained or confirmed \"Medication Contract\" on file.    Chronic Pain > 120 MEDD (historical values) -   Chronic Pain > 120 MEDD -           Follow up two months

## 2020-09-22 ENCOUNTER — PROCEDURE VISIT (OUTPATIENT)
Dept: PAIN MANAGEMENT | Age: 65
End: 2020-09-22
Payer: MEDICARE

## 2020-09-22 VITALS
BODY MASS INDEX: 34.86 KG/M2 | SYSTOLIC BLOOD PRESSURE: 132 MMHG | HEIGHT: 72 IN | DIASTOLIC BLOOD PRESSURE: 80 MMHG | WEIGHT: 257.4 LBS

## 2020-09-22 PROCEDURE — 20610 DRAIN/INJ JOINT/BURSA W/O US: CPT | Performed by: NURSE PRACTITIONER

## 2020-09-22 RX ORDER — HYALURONATE SODIUM 10 MG/ML
20 SYRINGE (ML) INTRAARTICULAR ONCE
Status: COMPLETED | OUTPATIENT
Start: 2020-09-22 | End: 2020-09-22

## 2020-09-22 RX ADMIN — Medication 20 MG: at 14:27

## 2020-09-22 NOTE — PROGRESS NOTES
Jaquelin Ochoa  1955  3/4/20      PAIN MANAGEMENT CLINIC PROCEDURE NOTE    CHIEF COMPLAINT: This is a 60 y.o. male patient who presents to the Pain Management Clinic with a history of pain in the left knee(s). PRE-PROCEDURE DIAGNOSIS: Left knee osteoarthritis      POST-PROCEDURE DIAGNOSIS: same as pre-procedure diagnosis    Vitals:    09/22/20 1359   BP: 132/80       PROCEDURE:     The procedure was explained, including risks and benefits, and the patient has agreed to proceed. The injection site was marked on the patients skin. A large area around the injection site was cleaned with chlora-prep    VISCO SUPPLEMENTATION INJECTION  A 22G 1.5 inch needle was inserted into the left knee(s) using a anterolateral approach. Depth and direction of the needle were monitored at all times. The syringe was aspirated and was negative for heme. Then, 2 ml ofeuflexxa was injected into the joint. Left: Exp date: 5/23/20, Lot  # [id number]    The injection site was cleaned and dressed with a spot band aid. The patient tolerated the procedure well and with out complication The patient was instructed avoid heat and excessive activity for 24-48 hours.

## 2020-09-29 ENCOUNTER — TELEPHONE (OUTPATIENT)
Dept: PAIN MANAGEMENT | Age: 65
End: 2020-09-29

## 2020-09-29 ENCOUNTER — PROCEDURE VISIT (OUTPATIENT)
Dept: PAIN MANAGEMENT | Age: 65
End: 2020-09-29
Payer: MEDICARE

## 2020-09-29 VITALS
WEIGHT: 257 LBS | DIASTOLIC BLOOD PRESSURE: 84 MMHG | HEART RATE: 76 BPM | SYSTOLIC BLOOD PRESSURE: 136 MMHG | BODY MASS INDEX: 34.86 KG/M2

## 2020-09-29 PROCEDURE — 99215 OFFICE O/P EST HI 40 MIN: CPT

## 2020-09-29 PROCEDURE — 20610 DRAIN/INJ JOINT/BURSA W/O US: CPT | Performed by: NURSE PRACTITIONER

## 2020-09-29 RX ORDER — HYALURONATE SODIUM 10 MG/ML
20 SYRINGE (ML) INTRAARTICULAR ONCE
Status: COMPLETED | OUTPATIENT
Start: 2020-09-29 | End: 2020-09-29

## 2020-09-29 RX ADMIN — Medication 20 MG: at 14:27

## 2020-09-29 NOTE — PROGRESS NOTES
Chang Morris  1955  3/4/20      PAIN MANAGEMENT CLINIC PROCEDURE NOTE    CHIEF COMPLAINT: This is a 60 y.o. male patient who presents to the Pain Management Clinic with a history of pain in the left knee(s). PRE-PROCEDURE DIAGNOSIS: Left knee osteoarthritis      POST-PROCEDURE DIAGNOSIS: same as pre-procedure diagnosis    Vitals:    09/29/20 1412   BP: 136/84   Pulse: 76       PROCEDURE:     The procedure was explained, including risks and benefits, and the patient has agreed to proceed. The injection site was marked on the patients skin. A large area around the injection site was cleaned with chlora-prep    VISCO SUPPLEMENTATION INJECTION  A 22G 1.5 inch needle was inserted into the left knee(s) using a anterolateral approach. Depth and direction of the needle were monitored at all times. The syringe was aspirated and was negative for heme. Then, 2 ml of euflexxa was injected into the joint. Left: Exp date: 5/23/20, Lot  # [id number]    The injection site was cleaned and dressed with a spot band aid. The patient tolerated the procedure well and with out complication The patient was instructed avoid heat and excessive activity for 24-48 hours.

## 2020-10-06 ENCOUNTER — PROCEDURE VISIT (OUTPATIENT)
Dept: PAIN MANAGEMENT | Age: 65
End: 2020-10-06
Payer: MEDICARE

## 2020-10-06 VITALS
HEIGHT: 72 IN | WEIGHT: 257 LBS | BODY MASS INDEX: 34.81 KG/M2 | SYSTOLIC BLOOD PRESSURE: 122 MMHG | DIASTOLIC BLOOD PRESSURE: 84 MMHG

## 2020-10-06 PROCEDURE — 20610 DRAIN/INJ JOINT/BURSA W/O US: CPT | Performed by: NURSE PRACTITIONER

## 2020-10-06 PROCEDURE — 99215 OFFICE O/P EST HI 40 MIN: CPT

## 2020-10-06 RX ORDER — HYALURONATE SODIUM 10 MG/ML
20 SYRINGE (ML) INTRAARTICULAR ONCE
Status: COMPLETED | OUTPATIENT
Start: 2020-10-06 | End: 2020-10-06

## 2020-10-06 RX ADMIN — Medication 20 MG: at 09:22

## 2020-10-06 NOTE — PROGRESS NOTES
Darryl Fontanabuddy  1955  3/4/20      PAIN MANAGEMENT CLINIC PROCEDURE NOTE    CHIEF COMPLAINT: This is a 60 y.o. male patient who presents to the Pain Management Clinic with a history of pain in the left knee(s). PRE-PROCEDURE DIAGNOSIS: Left knee osteoarthritis      POST-PROCEDURE DIAGNOSIS: same as pre-procedure diagnosis    Vitals:    10/06/20 0907   BP: 122/84       PROCEDURE:     The procedure was explained, including risks and benefits, and the patient has agreed to proceed. The injection site was marked on the patients skin. A large area around the injection site was cleaned with chlora-prep    VISCO SUPPLEMENTATION INJECTION  A 22G 1.5 inch needle was inserted into the left knee(s) using a anterolateral approach. Depth and direction of the needle were monitored at all times. The syringe was aspirated and was negative for heme. Then, 2 ml of euflexxa was injected into the joint. Left: Exp date: 6/7/21, Lot  # [id number]    The injection site was cleaned and dressed with a spot band aid. The patient tolerated the procedure well and with out complication The patient was instructed avoid heat and excessive activity for 24-48 hours.

## 2020-10-15 NOTE — TELEPHONE ENCOUNTER
Last Appt:  10/6/2020  Next Appt:   1/5/2021  Med verified in [facility] for [place], [place], and [place]: 09/25/20 Oxycontin Er 30mg #60. Due 10/25/20      09/25/20 Norco 10/325 # 120 Due 10/25/20.

## 2020-10-16 RX ORDER — HYDROCODONE BITARTRATE AND ACETAMINOPHEN 10; 325 MG/1; MG/1
1 TABLET ORAL EVERY 6 HOURS PRN
Qty: 120 TABLET | Refills: 0 | Status: SHIPPED | OUTPATIENT
Start: 2020-10-25 | End: 2020-11-18 | Stop reason: SDUPTHER

## 2020-10-16 RX ORDER — OXYCODONE HYDROCHLORIDE 30 MG/1
30 TABLET, FILM COATED, EXTENDED RELEASE ORAL EVERY 12 HOURS
Qty: 60 EACH | Refills: 0 | Status: SHIPPED | OUTPATIENT
Start: 2020-10-25 | End: 2020-11-18 | Stop reason: SDUPTHER

## 2020-11-18 NOTE — TELEPHONE ENCOUNTER
Last Appt:  10/6/20 office injection  Next Appt:   1/5/21  Med [facility] for [place], and [place]: Hydrocodone/APAP  10/325 mg #120 on 10/26/20 . Due 11/25/20. Oxycontin ER 30 mg #60 on 10/26/20. Due 11/25/20.

## 2020-11-20 RX ORDER — HYDROCODONE BITARTRATE AND ACETAMINOPHEN 10; 325 MG/1; MG/1
1 TABLET ORAL EVERY 6 HOURS PRN
Qty: 120 TABLET | Refills: 0 | Status: SHIPPED | OUTPATIENT
Start: 2020-11-25 | End: 2020-12-16 | Stop reason: SDUPTHER

## 2020-11-20 RX ORDER — OXYCODONE HYDROCHLORIDE 30 MG/1
30 TABLET, FILM COATED, EXTENDED RELEASE ORAL EVERY 12 HOURS
Qty: 60 EACH | Refills: 0 | Status: SHIPPED | OUTPATIENT
Start: 2020-11-25 | End: 2020-12-16 | Stop reason: SDUPTHER

## 2020-12-16 NOTE — TELEPHONE ENCOUNTER
OAR[facility] Report checked for [place], and [place]: 11/25/2020 norco 10-325mg #120. Due 12/25/2020.          11/25/2020 oxycontin ER 30mg #60. Due 12/25/2020. Due pita day, will pend for early fill.     Last Appt:  10/6/2020  Next Appt:   1/5/2021  Med verified in [facility]

## 2020-12-17 RX ORDER — HYDROCODONE BITARTRATE AND ACETAMINOPHEN 10; 325 MG/1; MG/1
1 TABLET ORAL EVERY 6 HOURS PRN
Qty: 120 TABLET | Refills: 0 | Status: SHIPPED | OUTPATIENT
Start: 2020-12-24 | End: 2021-01-18 | Stop reason: SDUPTHER

## 2020-12-17 RX ORDER — OXYCODONE HYDROCHLORIDE 30 MG/1
30 TABLET, FILM COATED, EXTENDED RELEASE ORAL EVERY 12 HOURS
Qty: 60 EACH | Refills: 0 | Status: SHIPPED | OUTPATIENT
Start: 2020-12-24 | End: 2021-01-18 | Stop reason: SDUPTHER

## 2021-01-05 ENCOUNTER — OFFICE VISIT (OUTPATIENT)
Dept: PAIN MANAGEMENT | Age: 66
End: 2021-01-05
Payer: MEDICARE

## 2021-01-05 VITALS
HEIGHT: 72 IN | WEIGHT: 250 LBS | SYSTOLIC BLOOD PRESSURE: 112 MMHG | BODY MASS INDEX: 33.86 KG/M2 | DIASTOLIC BLOOD PRESSURE: 64 MMHG

## 2021-01-05 DIAGNOSIS — M25.562 CHRONIC PAIN OF LEFT KNEE: ICD-10-CM

## 2021-01-05 DIAGNOSIS — M54.2 CHRONIC CERVICAL PAIN: ICD-10-CM

## 2021-01-05 DIAGNOSIS — G89.29 CHRONIC LEFT-SIDED LOW BACK PAIN WITH LEFT-SIDED SCIATICA: ICD-10-CM

## 2021-01-05 DIAGNOSIS — G89.29 CHRONIC PAIN OF LEFT KNEE: ICD-10-CM

## 2021-01-05 DIAGNOSIS — G89.29 CHRONIC CERVICAL PAIN: ICD-10-CM

## 2021-01-05 DIAGNOSIS — G89.29 CHRONIC MYOFASCIAL PAIN: Primary | ICD-10-CM

## 2021-01-05 DIAGNOSIS — M54.42 CHRONIC LEFT-SIDED LOW BACK PAIN WITH LEFT-SIDED SCIATICA: ICD-10-CM

## 2021-01-05 DIAGNOSIS — M17.12 PRIMARY OSTEOARTHRITIS OF LEFT KNEE: ICD-10-CM

## 2021-01-05 DIAGNOSIS — M79.18 CHRONIC MYOFASCIAL PAIN: Primary | ICD-10-CM

## 2021-01-05 PROCEDURE — 99214 OFFICE O/P EST MOD 30 MIN: CPT | Performed by: NURSE PRACTITIONER

## 2021-01-05 ASSESSMENT — ENCOUNTER SYMPTOMS
BACK PAIN: 1
SHORTNESS OF BREATH: 0
CONSTIPATION: 0
EYES NEGATIVE: 1

## 2021-01-05 NOTE — PATIENT INSTRUCTIONS
Pain Management Plan:  1. Continue current medications    Pain clinic phone numbers  ? Refills  - Call [phone]. Please leave your name, a working phone number, date of birth, the name, dose, quantity, and last refill date of the prescription, and the pharmacy. ? Appointment or General Questions - Call  [phone]. This is the direct line the  [facility] .  ? You can also use Department of Health and Human Services. Is your MyChart Activated? How to dispose of unused pain medications safely:  · Flush all unused pain medications. This is the FDA's recommended way of disposing these medications. · All other medications can be disposed in the trash mixed in undesirable contents (used coffee grounds, used [employer], etc).

## 2021-01-05 NOTE — PROGRESS NOTES
Mucinex [Guaifenesin Er] Diarrhea and Nausea And Vomiting    Cephalexin Hives    Chantix [Varenicline Tartrate] Other (See Comments)     Worsening depression    Gabapentin Other (See Comments)     Severe depression    Adhesive Tape Rash       Outpatient Medications Marked as Taking for the 1/5/21 encounter (Office Visit) with SAHARA Ren - CNP   Medication Sig Dispense Refill    HYDROcodone-acetaminophen (NORCO)  MG per tablet Take 1 tablet by mouth every 6 hours as needed for Pain for up to 30 days. 120 tablet 0    oxyCODONE (OXYCONTIN) 30 MG T12A extended release tablet Take 30 mg by mouth every 12 hours for 30 days. 60 each 0    fluticasone (FLONASE) 50 MCG/ACT nasal spray 1 spray by Nasal route 2 times daily      ketorolac 0.4 % SOLN ophthalmic solution instill 1 drop into right eye three times a day      tamsulosin (FLOMAX) 0.4 MG capsule take 1 capsule by mouth at bedtime      traZODone (DESYREL) 50 MG tablet TAKE 1/2 TO 2  TABLETS BY MOUTH AT BEDTIME AS NEEDED FOR SLEEP      diclofenac sodium (VOLTAREN) 1 % GEL Apply 2 g topically 4 times daily as needed for Pain 5 Tube 11    budesonide-formoterol (SYMBICORT) 160-4.5 MCG/ACT AERO Inhale 2 puffs into the lungs 2 times daily      benzonatate (TESSALON) 100 MG capsule as needed      NIFEdipine (PROCARDIA XL) 30 MG extended release tablet Take 30 mg by mouth daily      levothyroxine (SYNTHROID) 100 MCG tablet Take 100 mcg by mouth daily      QUEtiapine (SEROQUEL XR) 300 MG extended release tablet Take 300 mg by mouth daily      TRINTELLIX 20 MG TABS tablet Take 20 mg by mouth daily      diphenhydrAMINE (BENADRYL ALLERGY) 25 MG capsule Take 25 mg by mouth every 6 hours as needed for Itching      aspirin 81 MG chewable tablet Take 81 mg by mouth      dexlansoprazole (DEXILANT) 60 MG CPDR capsule Take 60 mg by mouth every morning       pravastatin (PRAVACHOL) 40 MG tablet Take 40 mg by mouth daily.       albuterol (PROVENTIL HFA;VENTOLIN HFA) 108 (90 BASE) MCG/ACT inhaler Inhale 2 puffs into the lungs every 6 hours as needed for Wheezing. Past Medical History:   Diagnosis Date    Backache, unspecified     Bladder cancer ([address]     Chronic pain of left knee     had supartz x 2 with no relief from [address] COPD (chronic obstructive pulmonary disease) ([address]     Depression     Eye problem 2019    bleeding and swelling behind Rt eye    Hyperlipidemia     Hypothyroidism     Other pulmonary embolism and infarction     Tobacco use disorder     WPW syndrome        Past Surgical History:   Procedure Laterality Date    BLADDER SURGERY  october    CARDIAC CATHETERIZATION      FINGER SURGERY Right     index finger flexor tendon repair    FINGER SURGERY Left     KNEE ARTHROSCOPY Left     LUNG BIOPSY Left 2015    benign results    OTHER SURGICAL HISTORY  2014    L4/L5    SINUS SURGERY  2017    Dr. Luna [place] TISSUE BIOPSY  2010    THROAT SURGERY  5/3/16    Abdominal fat used to repair vocal cords    TONSILLECTOMY         Family History   Problem Relation Age of Onset    Cancer Father         lung    Heart Disease Mother        Social History     Socioeconomic History    Marital status:      Spouse name: None    Number of children: None    Years of education: None    Highest education level: None   Occupational History    None   Social Needs    Financial resource strain: None    Food insecurity     Worry: None     Inability: None    Transportation needs     Medical: None     Non-medical: None   Tobacco Use    Smoking status: Former Smoker     Packs/day: 0.50     Years: 46.00     Pack years: 23.00     Types: Cigarettes     Start date:      Quit date: 2020     Years since quittin.8    Smokeless tobacco: Never Used    Tobacco comment: Pt down to 1/2 ppd from 3 ppd, attempting to quit. Substance and Sexual Activity    Alcohol use:  No  Drug use: No    Sexual activity: None   Lifestyle    Physical activity     Days per week: None     Minutes per session: None    Stress: None   Relationships    Social connections     Talks on phone: None     Gets together: None     Attends Jew service: None     Active member of club or organization: None     Attends meetings of clubs or organizations: None     Relationship status: None    Intimate partner violence     Fear of current or ex partner: None     Emotionally abused: None     Physically abused: None     Forced sexual activity: None   Other Topics Concern    None   Social History Narrative    None     Review of Systems   Constitutional: Positive for fatigue. HENT: Negative. Eyes: Negative. Respiratory: Negative for shortness of breath. Cardiovascular: Negative for chest pain. Gastrointestinal: Negative for constipation. Musculoskeletal: Positive for arthralgias, back pain, myalgias and neck pain. Skin: Negative. Allergic/Immunologic: Negative for environmental allergies and food allergies. Neurological: Positive for weakness and numbness. Psychiatric/Behavioral: Positive for sleep disturbance. Objective:   Physical Exam  Constitutional:       General: He is not in acute distress. Appearance: He is well-developed. He is not ill-appearing, toxic-appearing or diaphoretic. HENT:      Head: Normocephalic and atraumatic. Cardiovascular:      Rate and Rhythm: Normal rate. Pulses: Normal pulses. Comments: Warm extremities. Normal capillary refill. Pulmonary:      Effort: Pulmonary effort is normal.   Skin:     General: Skin is warm and dry. Coloration: Skin is not pale. Neurological:      Mental Status: He is alert and oriented to person, place, and time. Cranial Nerves: No cranial nerve deficit. Sensory: No sensory deficit. Motor: No atrophy or abnormal muscle tone. Deep Tendon Reflexes: Reflexes are normal and symmetric. Psychiatric:         Mood and Affect: Affect is not angry. Speech: Speech normal.         Behavior: Behavior normal. Behavior is not agitated, aggressive or withdrawn. Behavior is cooperative. Judgment: Judgment normal. Judgment is not impulsive or inappropriate. Assessment:      1. Chronic myofascial pain    2. Chronic cervical pain    3. Primary osteoarthritis of left knee    4. Chronic pain of left knee    5. Chronic left-sided low back pain with left-sided sciatica          Plan:     Chronic pain diagnoses such as   1. Chronic myofascial pain    2. Chronic cervical pain    3. Primary osteoarthritis of left knee    4. Chronic pain of left knee    5. Chronic left-sided low back pain with left-sided sciatica     controlled on current medication regime, wll continue current pain medications to improve quality of life and function. SOAPP- the score is 4 (Values indicates patient is <4minimal potential 4-7 Moderate potential >7 High potential for drug addiction)    Controlled Substances Monitoring:     RX Monitoring 9/14/2020   Attestation -   Periodic Controlled Substance Monitoring No signs of potential drug abuse or diversion identified.;Obtaining appropriate analgesic effect of treatment. ;Random urine drug screen sent today. Chronic Pain > 50 MEDD -   Chronic Pain > 80 MEDD Obtained or confirmed \"Medication Contract\" on file.    Chronic Pain > 120 MEDD (historical values) -   Chronic Pain > 120 MEDD -           Follow up two months

## 2021-01-18 DIAGNOSIS — G89.29 CHRONIC BILATERAL LOW BACK PAIN WITH LEFT-SIDED SCIATICA: ICD-10-CM

## 2021-01-18 DIAGNOSIS — M54.42 CHRONIC BILATERAL LOW BACK PAIN WITH LEFT-SIDED SCIATICA: ICD-10-CM

## 2021-01-18 DIAGNOSIS — M17.12 PRIMARY OSTEOARTHRITIS OF LEFT KNEE: ICD-10-CM

## 2021-01-18 DIAGNOSIS — G89.29 CHRONIC CERVICAL PAIN: ICD-10-CM

## 2021-01-18 DIAGNOSIS — M54.2 CHRONIC CERVICAL PAIN: ICD-10-CM

## 2021-01-18 NOTE — TELEPHONE ENCOUNTER
OARRS Report checked for [place], and [place]: 12/24/20 norco 10-325mg #120. Due 1/23/2020.          12/24/2020 oxycontin ER 30mg #60. Due 1/23/2020.

## 2021-01-20 RX ORDER — HYDROCODONE BITARTRATE AND ACETAMINOPHEN 10; 325 MG/1; MG/1
1 TABLET ORAL EVERY 6 HOURS PRN
Qty: 120 TABLET | Refills: 0 | Status: SHIPPED | OUTPATIENT
Start: 2021-01-22 | End: 2021-02-17 | Stop reason: SDUPTHER

## 2021-01-20 RX ORDER — OXYCODONE HYDROCHLORIDE 30 MG/1
30 TABLET, FILM COATED, EXTENDED RELEASE ORAL EVERY 12 HOURS
Qty: 60 EACH | Refills: 0 | Status: SHIPPED | OUTPATIENT
Start: 2021-01-22 | End: 2021-02-17 | Stop reason: SDUPTHER

## 2021-01-26 ENCOUNTER — OFFICE VISIT (OUTPATIENT)
Dept: ORTHOPEDIC SURGERY | Age: 66
End: 2021-01-26
Payer: MEDICARE

## 2021-01-26 VITALS
WEIGHT: 250 LBS | SYSTOLIC BLOOD PRESSURE: 128 MMHG | BODY MASS INDEX: 33.86 KG/M2 | HEART RATE: 107 BPM | HEIGHT: 72 IN | DIASTOLIC BLOOD PRESSURE: 78 MMHG

## 2021-01-26 DIAGNOSIS — G89.29 CHRONIC PAIN OF RIGHT ANKLE: Primary | ICD-10-CM

## 2021-01-26 DIAGNOSIS — M25.571 CHRONIC PAIN OF RIGHT ANKLE: Primary | ICD-10-CM

## 2021-01-26 DIAGNOSIS — M25.371 INSTABILITY OF RIGHT ANKLE JOINT: ICD-10-CM

## 2021-01-26 PROCEDURE — L4387 NON-PNEUM WALK BOOT PRE OTS: HCPCS | Performed by: NURSE PRACTITIONER

## 2021-01-26 PROCEDURE — L1902 AFO ANKLE GAUNTLET PRE OTS: HCPCS | Performed by: NURSE PRACTITIONER

## 2021-01-26 PROCEDURE — 99213 OFFICE O/P EST LOW 20 MIN: CPT | Performed by: NURSE PRACTITIONER

## 2021-01-26 PROCEDURE — 99214 OFFICE O/P EST MOD 30 MIN: CPT | Performed by: NURSE PRACTITIONER

## 2021-01-26 RX ORDER — RIVAROXABAN 10 MG/1
10 TABLET, FILM COATED ORAL DAILY
COMMUNITY

## 2021-02-01 NOTE — PROGRESS NOTES
Backache, unspecified     [facility] ([facility] [address]     Chronic pain of left knee     had supartz x 2 with no relief from [address] COPD (chronic obstructive pulmonary disease) ([facility] [address]     Depression     Eye problem 2019    bleeding and swelling behind Rt eye    Hyperlipidemia     Hypothyroidism     Other pulmonary embolism and infarction     Tobacco use disorder     WPW syndrome        PAST SURGICAL HISTORY    Past Surgical History:   Procedure Laterality Date    BLADDER SURGERY  october    CARDIAC CATHETERIZATION      FINGER SURGERY Right 2006    index finger flexor tendon repair    FINGER SURGERY Left 2014    KNEE ARTHROSCOPY Left     LUNG BIOPSY Left 2015    benign results    OTHER SURGICAL HISTORY  2014    L4/L5    SINUS SURGERY  2017    Dr. Mimi Bertrand TISSUE BIOPSY  ,     THROAT SURGERY  5/3/16    Abdominal fat used to repair vocal cords    TONSILLECTOMY         FAMILY HISTORY    Family History   Problem Relation Age of Onset    Cancer Father         lung    Heart Disease Mother        SOCIAL HISTORY    Social History     Tobacco Use    Smoking status: Former Smoker     Packs/day: 0.50     Years: 46.00     Pack years: 23.00     Types: Cigarettes     Start date:      Quit date: 2020     Years since quittin.9    Smokeless tobacco: Never Used    Tobacco comment: Pt down to 1/2 ppd from 3 ppd, attempting to quit.    Substance Use Topics    Alcohol use: No    Drug use: No       ALLERGIES    Allergies   Allergen Reactions    Penicillins Shortness Of Breath    Mucinex [Guaifenesin Er] Diarrhea and Nausea And Vomiting    Cephalexin Hives    Chantix [Varenicline Tartrate] Other (See Comments)     Worsening depression    Gabapentin Other (See Comments)     Severe depression    Adhesive Tape Rash       MEDICATIONS    Current Outpatient Medications on File Prior to Visit   Medication Sig Dispense Refill    rivaroxaban (Alisson Lewis) 10 vomiting, visual changes  Eyes: (-) glasses, contact lenses, blurriness, tearing, itching, acute visual changes  Ears: (-) hearing loss, tinnitus, vertigo, discharge, earache  Skin: (-) rash, subcutaneous lesion, open ulceration      Objective:      /78   Pulse 107   Ht 6' (1.829 m)   Wt 250 lb (113.4 kg)   BMI 33.91 kg/m²     Wt Readings from Last 3 Encounters:   01/26/21 250 lb (113.4 kg)   01/05/21 250 lb (113.4 kg)   10/06/20 257 lb (116.6 kg)       PHYSICAL EXAMINATION  CONSTITUTIONAL:  Awake, alert, cooperative, no apparent distress, and appears stated age    Vascular: DP and PT pulses are palpable to the right lower extremity. Cap refill less than 5 seconds. Skin was warm and within normal limits. Mild amount of edema still noted within that right lower extremity. Dermatologic: Skin appears well-maintained and intact. No open lesions of note. Neurovascular: Epicritic and protopathic sensations are grossly intact to the right lower extremity. Musculoskeletal: Patient's right lower extremity is noted to turn outward and is somewhat painful when that right lower extremity is put back into a rectus position. Patient also has decreased range of motion within that right ankle. Patient is currently ambulating in tennis shoes today without any type of bracing or assistive devices. Patient is noted to have a significant limp to his gait today. Patient will be dispensed an offloading pneumatic fracture boot today.        LABS       CBC: No results found for: WBC, HGB, HCT, MCV, PLT  BMP: No results found for: NA, K, CL, CO2, PHOS, BUN, CREATININE  PT/INR:   Lab Results   Component Value Date    PROTIME 21.4 03/31/2015    INR 2.0 03/31/2015     Prealbumin: No results found for: PREALBUMIN  Albumin:No results found for: LABALBU  Sed Rate:No results found for: SEDRATE  CRP: No results found for: CRP  Micro: No results found for: BC   Hemoglobin A1C: No results found for: LABA1C    Assessment: 1. Chronic pain of right ankle  - Non-pneum walk boot pre ots  - Afo ankle gauntlet pre ots  - Ambulatory referral to Physical Therapy    2. Instability of right ankle joint  - Non-pneum walk boot pre ots  - Afo ankle gauntlet pre ots  - Ambulatory referral to Physical Therapy      Patient Active Problem List   Diagnosis    Chronic left-sided low back pain with left-sided sciatica    Chronic cervical pain    Neuropathy    Chronic myofascial pain    Osteoarthritis of left knee    Opiate analgesic use agreement exists    Encounter for drug screening    Chronic pain of left knee    Encounter for long-term opiate analgesic use    Bladder cancer ([facility] [address]         Procedure Note  N/A    Plan:     Patient examined and evaluated today. Patient noted to have ongoing right ankle pain with gait instability. Patient was dispensed an offload pneumatic fracture boot as well as an ASO lace up ankle brace today. Patient will start with physical therapy at this time for ongoing right ankle pain and gait instability. PT to transition patient from boot to ankle brace as they see fit. Patient will follow back up with us again at the 4-week partah.       [facility]     Electronically signed by SAHARA Humphrey CNP on 2/1/2021 at 7:40 AM

## 2021-02-03 DIAGNOSIS — M25.571 CHRONIC PAIN OF RIGHT ANKLE: Primary | ICD-10-CM

## 2021-02-03 DIAGNOSIS — M25.371 INSTABILITY OF RIGHT ANKLE JOINT: ICD-10-CM

## 2021-02-03 DIAGNOSIS — G89.29 CHRONIC PAIN OF RIGHT ANKLE: Primary | ICD-10-CM

## 2021-02-17 DIAGNOSIS — G89.29 CHRONIC CERVICAL PAIN: ICD-10-CM

## 2021-02-17 DIAGNOSIS — M54.2 CHRONIC CERVICAL PAIN: ICD-10-CM

## 2021-02-17 DIAGNOSIS — M54.42 CHRONIC BILATERAL LOW BACK PAIN WITH LEFT-SIDED SCIATICA: ICD-10-CM

## 2021-02-17 DIAGNOSIS — M17.12 PRIMARY OSTEOARTHRITIS OF LEFT KNEE: ICD-10-CM

## 2021-02-17 DIAGNOSIS — G89.29 CHRONIC BILATERAL LOW BACK PAIN WITH LEFT-SIDED SCIATICA: ICD-10-CM

## 2021-02-17 RX ORDER — HYDROCODONE BITARTRATE AND ACETAMINOPHEN 10; 325 MG/1; MG/1
1 TABLET ORAL EVERY 6 HOURS PRN
Qty: 120 TABLET | Refills: 0 | Status: SHIPPED | OUTPATIENT
Start: 2021-02-22 | End: 2021-03-15 | Stop reason: SDUPTHER

## 2021-02-17 RX ORDER — OXYCODONE HYDROCHLORIDE 30 MG/1
30 TABLET, FILM COATED, EXTENDED RELEASE ORAL EVERY 12 HOURS
Qty: 60 EACH | Refills: 0 | Status: SHIPPED | OUTPATIENT
Start: 2021-02-22 | End: 2021-03-15 | Stop reason: SDUPTHER

## 2021-02-17 NOTE — TELEPHONE ENCOUNTER
OAR[place] Report checked for [place], and [place]: 1/22/2021 oxycontin ER 30mg #60. Due 2/21/2021.          1/22/2021 Norco 10-325MG #120. Due 2/21/2021. Pended for 2/22/2021 because the patient filled early last month due to pharmacy hours, he should have enough medication.      Last Appt:  1/5/2021  Next Appt:   3/15/2021  Med verified in [address] [place] [address] Rd

## 2021-02-23 ENCOUNTER — OFFICE VISIT (OUTPATIENT)
Dept: ORTHOPEDIC SURGERY | Age: 66
End: 2021-02-23
Payer: MEDICARE

## 2021-02-23 VITALS
OXYGEN SATURATION: 94 % | SYSTOLIC BLOOD PRESSURE: 136 MMHG | BODY MASS INDEX: 33.86 KG/M2 | WEIGHT: 250 LBS | DIASTOLIC BLOOD PRESSURE: 88 MMHG | HEART RATE: 94 BPM | HEIGHT: 72 IN

## 2021-02-23 DIAGNOSIS — S90.01XA CONTUSION OF RIGHT ANKLE, INITIAL ENCOUNTER: ICD-10-CM

## 2021-02-23 DIAGNOSIS — M25.371 RIGHT ANKLE INSTABILITY: Primary | ICD-10-CM

## 2021-02-23 PROCEDURE — 99213 OFFICE O/P EST LOW 20 MIN: CPT | Performed by: PODIATRIST

## 2021-02-23 PROCEDURE — 99214 OFFICE O/P EST MOD 30 MIN: CPT | Performed by: PODIATRIST

## 2021-02-23 RX ORDER — FERROUS SULFATE 325(65) MG
325 TABLET ORAL
COMMUNITY
Start: 2021-02-08

## 2021-02-23 RX ORDER — PREDNISONE 20 MG/1
20 TABLET ORAL DAILY
Qty: 14 TABLET | Refills: 0 | Status: SHIPPED | OUTPATIENT
Start: 2021-02-23 | End: 2021-03-09

## 2021-02-23 RX ORDER — LORATADINE 10 MG/1
10 TABLET ORAL DAILY
COMMUNITY
Start: 2021-02-01

## 2021-02-23 RX ORDER — CYANOCOBALAMIN 1000 UG/ML
1000 INJECTION INTRAMUSCULAR; SUBCUTANEOUS
COMMUNITY
Start: 2020-11-15 | End: 2021-04-19

## 2021-02-23 RX ORDER — NEOMYCIN SULFATE, POLYMYXIN B SULFATE AND HYDROCORTISONE 10; 3.5; 1 MG/ML; MG/ML; [USP'U]/ML
4 SUSPENSION/ DROPS AURICULAR (OTIC) 3 TIMES DAILY
COMMUNITY
Start: 2020-12-29 | End: 2021-11-22 | Stop reason: ALTCHOICE

## 2021-02-23 RX ORDER — ASCORBIC ACID 500 MG
500 TABLET ORAL DAILY
COMMUNITY
Start: 2020-11-16

## 2021-02-23 RX ORDER — PANTOPRAZOLE SODIUM 40 MG/1
40 TABLET, DELAYED RELEASE ORAL
COMMUNITY
Start: 2021-02-08

## 2021-02-23 RX ORDER — DOXYCYCLINE HYCLATE 100 MG/1
CAPSULE ORAL
COMMUNITY
Start: 2021-02-09 | End: 2021-03-30

## 2021-02-23 RX ORDER — CLINDAMYCIN HYDROCHLORIDE 300 MG/1
300 CAPSULE ORAL 4 TIMES DAILY
COMMUNITY
Start: 2021-02-19 | End: 2021-06-08

## 2021-02-23 NOTE — PROGRESS NOTES
[address]         Progress and Procedure Note      Merlin Molt  MEDICAL RECORD NUMBER:  [id number]  AGE: 60 y.o. GENDER: male  : 1955  EPISODE DATE:  2021    Subjective:     Chief Complaint   Patient presents with    Ankle Pain     Rt         HISTORY of PRESENT ILLNESS HPI     Merlin Molt is a 60 y.o. male is presenting for continued follow-up of right foot ankle pain. Patient was initially evaluated by nurse practitioner Petros moy on 2021. Patient was initially fit a pneumatic fracture boot as well as ASO lace up ankle brace. Patient was to remain in the offloading pneumatic fracture boot for 2 weeks followed by incorporation of the lace up ankle brace. Patient has right ankle instability lending back to an initial injury dated 2020. Patient was initially diagnosed with what was described as a nondisplaced fibular fracture and placed in a below-knee fiberglass cast followed by application of an air splint by an outside physician. Patient states that he had continued pain with the right foot and ankle as well as what he deems external rotation of the ankle mortise causing him to have an antalgic gait. To date patient continues to have moderate pain rating his pain an 8 out of 10 at its worst.  We discussed conservative or surgical care moving forward. Based on the lack of improvement conservatively including anti-inflammatories as well as ankle stability devices plan for incorporation of an MRI of the ankle is indicated. Patient will be scheduled through [facility] for evaluation of an MRI. Continue with use of ankle restraint in the form of an ASO with updated prescription for prednisone. Patient will follow-up with us at the 3 to 4-week partha tentatively. All further questions concerns regarding medical management were otherwise addressed.           PAST MEDICAL HISTORY        Diagnosis Date    Backache, unspecified     [facility] [address]     Chronic pain of left knee     had supartz x 2 with no relief from [address] COPD (chronic obstructive pulmonary disease) ([facility] [address]     Depression     Eye problem 2019    bleeding and swelling behind Rt eye    Hyperlipidemia     Hypothyroidism     Other pulmonary embolism and infarction     Tobacco use disorder     WPW syndrome        PAST SURGICAL HISTORY    Past Surgical History:   Procedure Laterality Date    BLADDER SURGERY  october    CARDIAC CATHETERIZATION      FINGER SURGERY Right     index finger flexor tendon repair    FINGER SURGERY Left     KNEE ARTHROSCOPY Left     LUNG BIOPSY Left 2015    benign results    OTHER SURGICAL HISTORY  2014    L4/L5    SINUS SURGERY  2017    Dr. Estefani Martin TISSUE BIOPSY  2010    THROAT SURGERY  5/3/16    Abdominal fat used to repair vocal cords    TONSILLECTOMY         FAMILY HISTORY    Family History   Problem Relation Age of Onset    Cancer Father         lung    Heart Disease Mother        SOCIAL HISTORY    Social History     Tobacco Use    Smoking status: Former Smoker     Packs/day: 0.50     Years: 46.00     Pack years: 23.00     Types: Cigarettes     Start date:      Quit date: 2020     Years since quittin.0    Smokeless tobacco: Never Used    Tobacco comment: Pt down to 1/2 ppd from 3 ppd, attempting to quit.    Substance Use Topics    Alcohol use: No    Drug use: No       ALLERGIES    Allergies   Allergen Reactions    Penicillins Shortness Of Breath    Mucinex [Guaifenesin Er] Diarrhea and Nausea And Vomiting    Cephalexin Hives    Chantix [Varenicline Tartrate] Other (See Comments)     Worsening depression    Gabapentin Other (See Comments)     Severe depression    Adhesive Tape Rash       MEDICATIONS    Current Outpatient Medications on File Prior to Visit   Medication Sig Dispense Refill    ascorbic acid (VITAMIN C) 500 MG tablet Take 500 mg by mouth daily  clindamycin (CLEOCIN) 300 MG capsule Take 300 mg by mouth 4 times daily      cyanocobalamin 1000 MCG/ML injection Inject 1,000 mcg into the muscle every 14 days      doxycycline hyclate (VIBRAMYCIN) 100 MG capsule take 1 capsule by mouth twice a day for 7 days      ferrous sulfate (IRON 325) 325 (65 Fe) MG tablet Take 325 mg by mouth 3 times daily (with meals)      loratadine (CLARITIN) 10 MG tablet Take 10 mg by mouth daily      neomycin-polymyxin-hydrocortisone (CORTISPORIN) 3.5-59753-5 otic suspension Place 4 drops in ear(s) 3 times daily      pantoprazole (PROTONIX) 40 MG tablet Take 40 mg by mouth every morning (before breakfast)      HYDROcodone-acetaminophen (NORCO)  MG per tablet Take 1 tablet by mouth every 6 hours as needed for Pain for up to 30 days. 120 tablet 0    oxyCODONE (OXYCONTIN) 30 MG T12A extended release tablet Take 30 mg by mouth every 12 hours for 30 days. 60 each 0    rivaroxaban (XARELTO) 10 MG TABS tablet Take 10 mg by mouth daily      fluticasone (FLONASE) 50 MCG/ACT nasal spray 1 spray by Nasal route 2 times daily      tamsulosin (FLOMAX) 0.4 MG capsule take 1 capsule by mouth at bedtime      traZODone (DESYREL) 50 MG tablet TAKE 1/2 TO 2  TABLETS BY MOUTH AT BEDTIME AS NEEDED FOR SLEEP      budesonide-formoterol (SYMBICORT) 160-4.5 MCG/ACT AERO Inhale 2 puffs into the lungs 2 times daily      benzonatate (TESSALON) 100 MG capsule as needed      NIFEdipine (PROCARDIA XL) 30 MG extended release tablet Take 30 mg by mouth daily      levothyroxine (SYNTHROID) 100 MCG tablet Take 100 mcg by mouth daily      QUEtiapine (SEROQUEL XR) 300 MG extended release tablet Take 300 mg by mouth daily      TRINTELLIX 20 MG TABS tablet Take 20 mg by mouth daily      aspirin 81 MG chewable tablet Take 81 mg by mouth      pravastatin (PRAVACHOL) 40 MG tablet Take 40 mg by mouth daily.       albuterol (PROVENTIL HFA;VENTOLIN HFA) 108 (90 BASE) MCG/ACT inhaler Inhale 2 puffs into the lungs every 6 hours as needed for Wheezing.  ketorolac 0.4 % SOLN ophthalmic solution instill 1 drop into right eye three times a day      diclofenac sodium (VOLTAREN) 1 % GEL Apply 2 g topically 4 times daily as needed for Pain (Patient not taking: Reported on 2/23/2021) 5 Tube 11    diphenhydrAMINE (BENADRYL ALLERGY) 25 MG capsule Take 25 mg by mouth every 6 hours as needed for Itching       No current facility-administered medications on file prior to visit. Review of Systems  General: (-) weight change, fatigue, weakness, fever, chills, night sweats  Head: (-) trauma, heacache location, frequency, nausea, vomiting, visual changes  Eyes: (-) glasses, contact lenses, blurriness, tearing, itching, acute visual changes  Ears: (-) hearing loss, tinnitus, vertigo, discharge, earache  Skin: (-) rash, subcutaneous lesion, open ulceration      Objective:      /88   Pulse 94   Ht 6' (1.829 m)   Wt 250 lb (113.4 kg)   SpO2 94%   BMI 33.91 kg/m²     Wt Readings from Last 3 Encounters:   02/23/21 250 lb (113.4 kg)   01/26/21 250 lb (113.4 kg)   01/05/21 250 lb (113.4 kg)       PHYSICAL EXAMINATION  CONSTITUTIONAL:  Awake, alert, cooperative, no apparent distress, and appears stated age  Vascular: DP and PT pulses are palpable to the right lower extremity. Cap refill less than 5 seconds. Skin was warm and within normal limits. Mild amount of edema still noted within that right lower extremity. Dermatologic: Skin appears well-maintained and intact. No open lesions of note. Neurovascular: Epicritic and protopathic sensations are grossly intact to the right lower extremity. Musculoskeletal: Patient's right lower extremity is noted to turn outward and is somewhat painful when that right lower extremity is put back into a rectus position. Patient also has decreased range of motion within that right ankle.   Patient is currently ambulating in tennis shoes today without any type of bracing or assistive devices. Patient is noted to have a significant limp to his gait today. Patient will be dispensed an offloading pneumatic fracture boot today. LABS       CBC: No results found for: WBC, HGB, HCT, MCV, PLT  BMP: No results found for: NA, K, CL, CO2, PHOS, BUN, CREATININE  PT/INR:   Lab Results   Component Value Date    PROTIME 21.4 03/31/2015    INR 2.0 03/31/2015     Prealbumin: No results found for: PREALBUMIN  Albumin:No results found for: LABALBU  Sed Rate:No results found for: SEDRATE  CRP: No results found for: CRP  Micro: No results found for: BC   Hemoglobin A1C: No results found for: LABA1C    Assessment:      Diagnosis Orders   1. Right ankle instability  MRI ANKLE RIGHT WO CONTRAST    predniSONE (DELTASONE) 20 MG tablet   2. Contusion of right ankle, initial encounter  MRI ANKLE RIGHT WO CONTRAST    predniSONE (DELTASONE) 20 MG tablet         Patient Active Problem List   Diagnosis    Chronic left-sided low back pain with left-sided sciatica    Chronic cervical pain    Neuropathy    Chronic myofascial pain    Osteoarthritis of left knee    Opiate analgesic use agreement exists    Encounter for drug screening    Chronic pain of left knee    Encounter for long-term opiate analgesic use    Bladder cancer [facility])         Procedure Note  N/A    Plan:     Patient examined and evaluated today. Previous imaging was again reviewed with patient  Patient will continue with use of an ASO restraint ankle brace  Updated prescription for prednisone provided  Patient to be scheduled for an MRI of the right ankle without contrast  Follow-up with us x4 weeks. Jesica Dang [facility]     Electronically signed by Jasmyn Soria DPM on 2/23/2021 at 4:27 PM     No orders of the defined types were placed in this encounter.

## 2021-03-10 ENCOUNTER — IMMUNIZATION (OUTPATIENT)
Dept: LAB | Age: 66
End: 2021-03-10
Payer: MEDICARE

## 2021-03-10 PROCEDURE — 91303 COVID-19, J&J VACCINE, PF, 0.5 ML DOSE: CPT

## 2021-03-15 ENCOUNTER — TELEMEDICINE (OUTPATIENT)
Dept: PAIN MANAGEMENT | Age: 66
End: 2021-03-15
Payer: MEDICARE

## 2021-03-15 DIAGNOSIS — G89.29 CHRONIC CERVICAL PAIN: ICD-10-CM

## 2021-03-15 DIAGNOSIS — M54.42 CHRONIC BILATERAL LOW BACK PAIN WITH LEFT-SIDED SCIATICA: ICD-10-CM

## 2021-03-15 DIAGNOSIS — G89.29 CHRONIC PAIN OF LEFT KNEE: ICD-10-CM

## 2021-03-15 DIAGNOSIS — G89.29 CHRONIC BILATERAL LOW BACK PAIN WITH LEFT-SIDED SCIATICA: ICD-10-CM

## 2021-03-15 DIAGNOSIS — M54.42 CHRONIC LEFT-SIDED LOW BACK PAIN WITH LEFT-SIDED SCIATICA: Primary | ICD-10-CM

## 2021-03-15 DIAGNOSIS — M54.2 CHRONIC CERVICAL PAIN: ICD-10-CM

## 2021-03-15 DIAGNOSIS — M25.562 CHRONIC PAIN OF LEFT KNEE: ICD-10-CM

## 2021-03-15 DIAGNOSIS — G89.29 CHRONIC LEFT-SIDED LOW BACK PAIN WITH LEFT-SIDED SCIATICA: Primary | ICD-10-CM

## 2021-03-15 DIAGNOSIS — M17.12 PRIMARY OSTEOARTHRITIS OF LEFT KNEE: ICD-10-CM

## 2021-03-15 PROCEDURE — 99212 OFFICE O/P EST SF 10 MIN: CPT | Performed by: NURSE PRACTITIONER

## 2021-03-15 PROCEDURE — 99214 OFFICE O/P EST MOD 30 MIN: CPT | Performed by: NURSE PRACTITIONER

## 2021-03-15 RX ORDER — HYDROCODONE BITARTRATE AND ACETAMINOPHEN 10; 325 MG/1; MG/1
1 TABLET ORAL EVERY 6 HOURS PRN
Qty: 120 TABLET | Refills: 0 | Status: SHIPPED | OUTPATIENT
Start: 2021-03-24 | End: 2021-04-19 | Stop reason: SDUPTHER

## 2021-03-15 RX ORDER — OXYCODONE HYDROCHLORIDE 30 MG/1
30 TABLET, FILM COATED, EXTENDED RELEASE ORAL EVERY 12 HOURS
Qty: 60 EACH | Refills: 0 | Status: SHIPPED | OUTPATIENT
Start: 2021-03-24 | End: 2021-04-19 | Stop reason: SDUPTHER

## 2021-03-15 ASSESSMENT — ENCOUNTER SYMPTOMS
EYES NEGATIVE: 1
BACK PAIN: 1
CONSTIPATION: 0
SHORTNESS OF BREATH: 0

## 2021-03-15 NOTE — PROGRESS NOTES
3/15/2021    TELEHEALTH EVALUATION -- Audio/Visual (During FLMGR-45 public health emergency)    HPI:    Cali Son (:  1955) has requested an audio/video evaluation for the following concern(s):    Here today for routine pain clinic recheck, medication management     Pain reduced on current medications, denies side effects, maintaining ADL's. Right ankle fracture, still wearing a walking boot. Following up with ortho, MRI ankle ordered. Left knee pain and swelling flared, using his left side more due to right ankle fracture    Review of Systems   Constitutional: Positive for fatigue. HENT: Negative. Eyes: Negative. Respiratory: Negative for shortness of breath. Cardiovascular: Negative for chest pain. Gastrointestinal: Negative for constipation. Musculoskeletal: Positive for arthralgias, back pain, myalgias and neck pain. Skin: Negative. Allergic/Immunologic: Negative for environmental allergies and food allergies. Neurological: Positive for weakness and numbness. Psychiatric/Behavioral: Positive for sleep disturbance. Prior to Visit Medications    Medication Sig Taking? Authorizing Provider   HYDROcodone-acetaminophen (NORCO)  MG per tablet Take 1 tablet by mouth every 6 hours as needed for Pain for up to 30 days. Yes Blake Pollen, APRN - CNP   oxyCODONE (OXYCONTIN) 30 MG T12A extended release tablet Take 30 mg by mouth every 12 hours for 30 days.  Yes Blake Pollen, APRN - CNP   ascorbic acid (VITAMIN C) 500 MG tablet Take 500 mg by mouth daily  Historical Provider, MD   clindamycin (CLEOCIN) 300 MG capsule Take 300 mg by mouth 4 times daily  Historical Provider, MD   cyanocobalamin 1000 MCG/ML injection Inject 1,000 mcg into the muscle every 14 days  Historical Provider, MD   doxycycline hyclate (VIBRAMYCIN) 100 MG capsule take 1 capsule by mouth twice a day for 7 days  Historical Provider, MD   ferrous sulfate (IRON 325) 325 (65 Fe) MG tablet Take 325 mg by tablet Take 40 mg by mouth daily. Historical Provider, MD   albuterol (PROVENTIL HFA;VENTOLIN HFA) 108 (90 BASE) MCG/ACT inhaler Inhale 2 puffs into the lungs every 6 hours as needed for Wheezing. Historical Provider, MD       Social History     Tobacco Use    Smoking status: Former Smoker     Packs/day: 0.50     Years: 46.00     Pack years: 23.00     Types: Cigarettes     Start date:      Quit date: 2020     Years since quittin.0    Smokeless tobacco: Never Used    Tobacco comment: Pt down to 1/2 ppd from 3 ppd, attempting to quit.    Substance Use Topics    Alcohol use: No    Drug use: No        Allergies   Allergen Reactions    Penicillins Shortness Of Breath    Mucinex [Guaifenesin Er] Diarrhea and Nausea And Vomiting    Cephalexin Hives    Chantix [Varenicline Tartrate] Other (See Comments)     Worsening depression    Gabapentin Other (See Comments)     Severe depression    Adhesive Tape Rash   ,   Past Medical History:   Diagnosis Date    Backache, unspecified     Bladder cancer (HCC)     Chronic pain of left knee     had supartz x 2 with no relief from [address] COPD (chronic obstructive pulmonary disease) [facility] [address]     Depression     Eye problem 2019    bleeding and swelling behind Rt eye    Hyperlipidemia     Hypothyroidism     Other pulmonary embolism and infarction     Tobacco use disorder     WPW syndrome    ,   Past Surgical History:   Procedure Laterality Date    BLADDER SURGERY  october    CARDIAC CATHETERIZATION      FINGER SURGERY Right     index finger flexor tendon repair    FINGER SURGERY Left     KNEE ARTHROSCOPY Left     LUNG BIOPSY Left 2015    benign results    OTHER SURGICAL HISTORY  2014    L4/L5    SINUS SURGERY  2017    Dr. Annabelle Kamara TISSUE BIOPSY  2010    THROAT SURGERY  5/3/16    Abdominal fat used to repair vocal cords    TONSILLECTOMY     ,   Social History     Tobacco Use    Smoking status: Former Smoker     Packs/day: 0.50     Years: 46.00     Pack years: 23.00     Types: Cigarettes     Start date:      Quit date: 2020     Years since quittin.0    Smokeless tobacco: Never Used    Tobacco comment: Pt down to 1/2 ppd from 3 ppd, attempting to quit. Substance Use Topics    Alcohol use: No    Drug use: No   ,   Family History   Problem Relation Age of Onset    Cancer Father         lung    Heart Disease Mother    ,   Immunization History   Administered Date(s) Administered    COVID-19, J&J, PF, 0.5 mL 03/10/2021   ,   Health Maintenance   Topic Date Due    Hepatitis C screen  Never done    Lipid screen  Never done    HIV screen  Never done    Diabetes screen  Never done    Shingles Vaccine (1 of 2) Never done    Colon cancer screen colonoscopy  Never done    Annual Wellness Visit (AWV)  Never done    DTaP/Tdap/Td vaccine (2 - Td) 2024    Flu vaccine  Completed    COVID-19 Vaccine  Completed    Hepatitis A vaccine  Aged Out    Hepatitis B vaccine  Aged Out    Hib vaccine  Aged Out    Meningococcal (ACWY) vaccine  Aged Out    Pneumococcal 0-64 years Vaccine  Aged Out       PHYSICAL EXAMINATION:  [ INSTRUCTIONS:  \"[x]\" Indicates a positive item  \"[]\" Indicates a negative item  -- DELETE ALL ITEMS NOT EXAMINED]  Vital Signs: (As obtained by patient/caregiver or practitioner observation)    Blood pressure-  Heart rate-    Respiratory rate-    Temperature-  Pulse oximetry-     Constitutional: [x] Appears well-developed and well-nourished [x] No apparent distress      [] Abnormal-   Mental status  [x] Alert and awake  [x] Oriented to person/place/time [x]Able to follow commands      Eyes:  EOM    [x]  Normal  [] Abnormal-  Sclera  [x]  Normal  [] Abnormal -         Discharge [x]  None visible  [] Abnormal -    HENT:   [x] Normocephalic, atraumatic.   [] Abnormal   [x] Mouth/Throat: Mucous membranes are moist.     External Ears [x] Normal  [] Abnormal-     Neck: [x] No visualized mass     Pulmonary/Chest: [] Respiratory effort normal.  [x] No visualized signs of difficulty breathing or respiratory distress        [] Abnormal-      Musculoskeletal:   [] Normal gait with no signs of ataxia         [] Normal range of motion of neck        [x] Abnormal- Left knee pain swelling, walking boot RLE    Neurological:        [] No Facial Asymmetry (Cranial nerve 7 motor function) (limited exam to video visit)          [x] No gaze palsy        [] Abnormal-         Skin:        [x] No significant exanthematous lesions or discoloration noted on facial skin         [] Abnormal-            Psychiatric:       [x] Normal Affect [x] No Hallucinations        [] Abnormal-     Other pertinent observable physical exam findings-     ASSESSMENT/PLAN:   Diagnosis Orders   1. Chronic left-sided low back pain with left-sided sciatica     2. Chronic pain of left knee     3. Primary osteoarthritis of left knee  HYDROcodone-acetaminophen (NORCO)  MG per tablet    oxyCODONE (OXYCONTIN) 30 MG T12A extended release tablet   4. Chronic bilateral low back pain with left-sided sciatica  HYDROcodone-acetaminophen (NORCO)  MG per tablet    oxyCODONE (OXYCONTIN) 30 MG T12A extended release tablet   5. Chronic cervical pain  HYDROcodone-acetaminophen (NORCO)  MG per tablet    oxyCODONE (OXYCONTIN) 30 MG T12A extended release tablet     Chronic pain diagnoses such as   1. Chronic left-sided low back pain with left-sided sciatica    2. Chronic pain of left knee    3. Primary osteoarthritis of left knee    4. Chronic bilateral low back pain with left-sided sciatica    5. Chronic cervical pain     controlled on current medication regime, wll continue current pain medications to improve quality of life and function.    Controlled Substance Monitoring:    Acute and Chronic Pain Monitoring:   RX Monitoring 3/15/2021   Attestation -   Periodic Controlled Substance Monitoring No signs of potential drug abuse or diversion identified.;Obtaining appropriate analgesic effect of treatment. Chronic Pain > 50 MEDD -   Chronic Pain > 80 MEDD -   Chronic Pain > 120 MEDD (historical values) -   Chronic Pain > 120 MEDD -         Return in about 1 month (around 4/15/2021). Sandrita Head, was evaluated through a synchronous (real-time) audio-video encounter. The patient (or guardian if applicable) is aware that this is a billable service. Verbal consent to proceed has been obtained within the past 12 months. The visit was conducted pursuant to the emergency declaration under the [address] [place] [address], [address] [place] [address] authority and the [place] General Act. Patient identification was verified, and a caregiver was present when appropriate. The patient was located in a state where the provider was credentialed to provide care. --SAHARA Johnson - CNP on 3/15/2021 at 10:57 AM    An electronic signature was used to authenticate this note.

## 2021-03-30 ENCOUNTER — OFFICE VISIT (OUTPATIENT)
Dept: ORTHOPEDIC SURGERY | Age: 66
End: 2021-03-30
Payer: MEDICARE

## 2021-03-30 VITALS
BODY MASS INDEX: 33.86 KG/M2 | DIASTOLIC BLOOD PRESSURE: 70 MMHG | WEIGHT: 250 LBS | HEIGHT: 72 IN | HEART RATE: 88 BPM | SYSTOLIC BLOOD PRESSURE: 132 MMHG | OXYGEN SATURATION: 96 %

## 2021-03-30 DIAGNOSIS — M25.371 RIGHT ANKLE INSTABILITY: Primary | ICD-10-CM

## 2021-03-30 DIAGNOSIS — S93.431D SYNDESMOTIC DISRUPTION OF RIGHT ANKLE, SUBSEQUENT ENCOUNTER: ICD-10-CM

## 2021-03-30 DIAGNOSIS — S82.401K CLOSED FRACTURE OF SHAFT OF RIGHT FIBULA WITH NONUNION, UNSPECIFIED FRACTURE MORPHOLOGY, SUBSEQUENT ENCOUNTER: ICD-10-CM

## 2021-03-30 PROCEDURE — 99214 OFFICE O/P EST MOD 30 MIN: CPT | Performed by: PODIATRIST

## 2021-03-30 PROCEDURE — 99213 OFFICE O/P EST LOW 20 MIN: CPT | Performed by: PODIATRIST

## 2021-03-31 NOTE — PROGRESS NOTES
FINGER SURGERY Right     index finger flexor tendon repair    FINGER SURGERY Left     KNEE ARTHROSCOPY Left     LUNG BIOPSY Left 2015    benign results    OTHER SURGICAL HISTORY  2014    L4/L5    SINUS SURGERY  2017    Dr. Dora Marrero TISSUE BIOPSY  2010    THROAT SURGERY  5/3/16    Abdominal fat used to repair vocal cords    TONSILLECTOMY         FAMILY HISTORY    Family History   Problem Relation Age of Onset    Cancer Father         lung    Heart Disease Mother        SOCIAL HISTORY    Social History     Tobacco Use    Smoking status: Former Smoker     Packs/day: 0.50     Years: 46.00     Pack years: 23.00     Types: Cigarettes     Start date:      Quit date: 2020     Years since quittin.1    Smokeless tobacco: Never Used    Tobacco comment: Pt down to 1/2 ppd from 3 ppd, attempting to quit. Substance Use Topics    Alcohol use: No    Drug use: No       ALLERGIES    Allergies   Allergen Reactions    Penicillins Shortness Of Breath    Mucinex [Guaifenesin Er] Diarrhea and Nausea And Vomiting    Cephalexin Hives    Chantix [Varenicline Tartrate] Other (See Comments)     Worsening depression    Gabapentin Other (See Comments)     Severe depression    Adhesive Tape Rash       MEDICATIONS    Current Outpatient Medications on File Prior to Visit   Medication Sig Dispense Refill    HYDROcodone-acetaminophen (NORCO)  MG per tablet Take 1 tablet by mouth every 6 hours as needed for Pain for up to 30 days. 120 tablet 0    oxyCODONE (OXYCONTIN) 30 MG T12A extended release tablet Take 30 mg by mouth every 12 hours for 30 days.  60 each 0    ascorbic acid (VITAMIN C) 500 MG tablet Take 500 mg by mouth daily      clindamycin (CLEOCIN) 300 MG capsule Take 300 mg by mouth 4 times daily      cyanocobalamin 1000 MCG/ML injection Inject 1,000 mcg into the muscle every 14 days      ferrous sulfate (IRON 325) 325 (65 Fe) MG tablet Take 325 mg by mouth 3 times daily (with meals)      loratadine (CLARITIN) 10 MG tablet Take 10 mg by mouth daily      neomycin-polymyxin-hydrocortisone (CORTISPORIN) 3.5-91497-4 otic suspension Place 4 drops in ear(s) 3 times daily      pantoprazole (PROTONIX) 40 MG tablet Take 40 mg by mouth every morning (before breakfast)      rivaroxaban (XARELTO) 10 MG TABS tablet Take 10 mg by mouth daily      fluticasone (FLONASE) 50 MCG/ACT nasal spray 1 spray by Nasal route 2 times daily      ketorolac 0.4 % SOLN ophthalmic solution instill 1 drop into right eye three times a day      tamsulosin (FLOMAX) 0.4 MG capsule take 1 capsule by mouth at bedtime      traZODone (DESYREL) 50 MG tablet TAKE 1/2 TO 2  TABLETS BY MOUTH AT BEDTIME AS NEEDED FOR SLEEP      budesonide-formoterol (SYMBICORT) 160-4.5 MCG/ACT AERO Inhale 2 puffs into the lungs 2 times daily      benzonatate (TESSALON) 100 MG capsule as needed      NIFEdipine (PROCARDIA XL) 30 MG extended release tablet Take 30 mg by mouth daily      levothyroxine (SYNTHROID) 100 MCG tablet Take 100 mcg by mouth daily      QUEtiapine (SEROQUEL XR) 300 MG extended release tablet Take 300 mg by mouth daily      TRINTELLIX 20 MG TABS tablet Take 20 mg by mouth daily      aspirin 81 MG chewable tablet Take 81 mg by mouth      pravastatin (PRAVACHOL) 40 MG tablet Take 40 mg by mouth daily.  albuterol (PROVENTIL HFA;VENTOLIN HFA) 108 (90 BASE) MCG/ACT inhaler Inhale 2 puffs into the lungs every 6 hours as needed for Wheezing.  diclofenac sodium (VOLTAREN) 1 % GEL Apply 2 g topically 4 times daily as needed for Pain (Patient not taking: Reported on 2/23/2021) 5 Tube 11    diphenhydrAMINE (BENADRYL ALLERGY) 25 MG capsule Take 25 mg by mouth every 6 hours as needed for Itching       No current facility-administered medications on file prior to visit.         Review of Systems  General: (-) weight change, fatigue, weakness, fever, chills, night sweats  Head: (-) trauma, heacache location, frequency, nausea, vomiting, visual changes  Eyes: (-) glasses, contact lenses, blurriness, tearing, itching, acute visual changes  Ears: (-) hearing loss, tinnitus, vertigo, discharge, earache  Skin: (-) rash, subcutaneous lesion, open ulceration      Objective:      /70   Pulse 88   Ht 6' (1.829 m)   Wt 250 lb (113.4 kg)   SpO2 96%   BMI 33.91 kg/m²     Wt Readings from Last 3 Encounters:   03/30/21 250 lb (113.4 kg)   02/23/21 250 lb (113.4 kg)   01/26/21 250 lb (113.4 kg)       PHYSICAL EXAMINATION  CONSTITUTIONAL:  Awake, alert, cooperative, no apparent distress, and appears stated age  Vascular: DP and PT pulses are palpable to the right lower extremity. Cap refill less than 5 seconds. Skin was warm and within normal limits. Mild amount of edema still noted within that right lower extremity. Dermatologic: Skin appears well-maintained and intact. No open lesions of note. Neurovascular: Epicritic and protopathic sensations are grossly intact to the right lower extremity. Musculoskeletal: 5/5 muscle strength to all extrinsic muscle groups tested. Patient continues to exhibit external rotation of the ankle, with positive tib-fib squeeze test, indicative of a syndesmotic ligament disruption. Moderate perimalleolar swelling appreciated with pain over distribution of the lateral malleolus. Imaging:  MRI   Mri obtained [facility] displays a nonunion oblique fibular fracture with concomitant distal tib-fib/syndesmotic ligament disruption.       CBC: No results found for: WBC, HGB, HCT, MCV, PLT  BMP: No results found for: NA, K, CL, CO2, PHOS, BUN, CREATININE  PT/INR:   Lab Results   Component Value Date    PROTIME 21.4 03/31/2015    INR 2.0 03/31/2015     Prealbumin: No results found for: PREALBUMIN  Albumin:No results found for: LABALBU  Sed Rate:No results found for: SEDRATE  CRP: No results found for: CRP  Micro: No results found for: BC   Hemoglobin A1C: No results found for: LABA1C    Assessment:      Diagnosis Orders   1. Right ankle instability     2. 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 Syndesmotic disruption of right ankle, subsequent encounter           Patient Active Problem List   Diagnosis    Chronic left-sided low back pain with left-sided sciatica    Chronic cervical pain    Neuropathy    Chronic myofascial pain    Osteoarthritis of left knee    Opiate analgesic use agreement exists    Encounter for drug screening    Chronic pain of left knee    Encounter for long-term opiate analgesic use    Bladder cancer [facility])         Procedure Note  N/A    Plan:     Patient examined and evaluated today. MRI reviewed in detail/discussed at length  Based on nature/location of pathology and minimal improvement with conservative intervention, patient would like to proceed with surgical intervention in the form of a right fibular ORIF with open repair syndesmotic ligament disruption  Patient provided preadmission testing   Follow up x1 week post op    Jesica Dang [facility]     Electronically signed by Beverly Mcguire DPM on 3/30/2021 at 11:36 PM     No orders of the defined types were placed in this encounter.

## 2021-04-19 ENCOUNTER — OFFICE VISIT (OUTPATIENT)
Dept: PAIN MANAGEMENT | Age: 66
End: 2021-04-19
Payer: MEDICARE

## 2021-04-19 ENCOUNTER — HOSPITAL ENCOUNTER (OUTPATIENT)
Age: 66
Setting detail: SPECIMEN
Discharge: HOME | End: 2021-04-19
Payer: MEDICARE

## 2021-04-19 VITALS
DIASTOLIC BLOOD PRESSURE: 80 MMHG | SYSTOLIC BLOOD PRESSURE: 130 MMHG | BODY MASS INDEX: 35.49 KG/M2 | HEIGHT: 72 IN | WEIGHT: 262 LBS

## 2021-04-19 DIAGNOSIS — M17.12 PRIMARY OSTEOARTHRITIS OF LEFT KNEE: Primary | ICD-10-CM

## 2021-04-19 DIAGNOSIS — Z02.83 ENCOUNTER FOR DRUG SCREENING: ICD-10-CM

## 2021-04-19 DIAGNOSIS — G89.29 CHRONIC CERVICAL PAIN: ICD-10-CM

## 2021-04-19 DIAGNOSIS — M54.2 CHRONIC CERVICAL PAIN: ICD-10-CM

## 2021-04-19 DIAGNOSIS — M54.42 CHRONIC BILATERAL LOW BACK PAIN WITH LEFT-SIDED SCIATICA: ICD-10-CM

## 2021-04-19 DIAGNOSIS — G89.29 CHRONIC BILATERAL LOW BACK PAIN WITH LEFT-SIDED SCIATICA: ICD-10-CM

## 2021-04-19 DIAGNOSIS — Z79.891 ENCOUNTER FOR LONG-TERM OPIATE ANALGESIC USE: ICD-10-CM

## 2021-04-19 DIAGNOSIS — M17.12 PRIMARY OSTEOARTHRITIS OF LEFT KNEE: ICD-10-CM

## 2021-04-19 PROCEDURE — 99214 OFFICE O/P EST MOD 30 MIN: CPT | Performed by: NURSE PRACTITIONER

## 2021-04-19 PROCEDURE — 80307 DRUG TEST PRSMV CHEM ANLYZR: CPT

## 2021-04-19 RX ORDER — OXYCODONE HYDROCHLORIDE 30 MG/1
30 TABLET, FILM COATED, EXTENDED RELEASE ORAL EVERY 12 HOURS
Qty: 60 EACH | Refills: 0 | Status: SHIPPED | OUTPATIENT
Start: 2021-04-23 | End: 2021-05-20 | Stop reason: SDUPTHER

## 2021-04-19 RX ORDER — HYDROCODONE BITARTRATE AND ACETAMINOPHEN 10; 325 MG/1; MG/1
1 TABLET ORAL EVERY 6 HOURS PRN
Qty: 120 TABLET | Refills: 0 | Status: SHIPPED | OUTPATIENT
Start: 2021-04-23 | End: 2021-05-20 | Stop reason: SDUPTHER

## 2021-04-19 ASSESSMENT — ENCOUNTER SYMPTOMS
EYES NEGATIVE: 1
SHORTNESS OF BREATH: 0
CONSTIPATION: 0
BACK PAIN: 1

## 2021-04-19 NOTE — PROGRESS NOTES
Subjective:      Patient ID: Sebastien Masters is a 60 y.o. male. Chief Complaint   Patient presents with    Ankle Pain     right    Knee Pain     left     Knee Pain   Associated symptoms include numbness. Back Pain  Associated symptoms include numbness and weakness. Pertinent negatives include no chest pain. Other  Associated symptoms include arthralgias, fatigue, myalgias, neck pain, numbness and weakness. Pertinent negatives include no chest pain. Ankle Pain   Associated symptoms include numbness. Here today for routine pain clinic recheck, medication management    Pain reduced on current medications, denies side effects, maintaining ADL's. Increased left knee pain, swelling, stiffness likely due to overuse due to right ankle pain. He is planning upcoming right ankle surgery    ADVERSE MEDICATION EFFECTS:   Nausea and vomiting: no   Constipation: no-Undercontrol-: no  Dizziness/drowsy/sleepy--not applicable  Urinary Retention: no    ACTIVITY/SOCIAL/EMOTIONAL:  Sleep Pattern: 6 hours per night.  generally restful sleep  Home Exercises: daily walking  Activity:unchanged  Emotional Issues: normal.   Currently seeing a Psychiatrist or Psychologist:  No      ABERRANT BEHAVIORS SINCE LAST VISIT  Lost rx/pills:------------------------------------------ no  Taking  medication as prescribed: ----------- no  Urine Drug Screen ---------------------------------  yes  Recent ER visits: -------------------------------------No  Pill count is appropriate: ---------------------------not applicable   Refills for prescriptions appropriate:---------- yes      Pain Assessment  Location of Pain: Ankle  Location Modifiers: (knee)  Severity of Pain: 6  Quality of Pain: Sharp  Duration of Pain: Persistent  Frequency of Pain: Constant  Aggravating Factors: Bending, Exercise, Kneeling, Squatting, Standing, Walking, Stairs  Limiting Behavior: Yes  Relieving Factors: Rest  Are there other pain locations you wish to document?: No    Allergies   Allergen Reactions    Penicillins Shortness Of Breath    Mucinex [Guaifenesin Er] Diarrhea and Nausea And Vomiting    Cephalexin Hives    Chantix [Varenicline Tartrate] Other (See Comments)     Worsening depression    Gabapentin Other (See Comments)     Severe depression    Adhesive Tape Rash       Outpatient Medications Marked as Taking for the 4/19/21 encounter (Office Visit) with SAHARA Goodman CNP   Medication Sig Dispense Refill    [START ON 4/23/2021] HYDROcodone-acetaminophen (NORCO)  MG per tablet Take 1 tablet by mouth every 6 hours as needed for Pain for up to 30 days. 120 tablet 0    [START ON 4/23/2021] oxyCODONE (OXYCONTIN) 30 MG T12A extended release tablet Take 30 mg by mouth every 12 hours for 30 days.  60 each 0    ascorbic acid (VITAMIN C) 500 MG tablet Take 500 mg by mouth daily      clindamycin (CLEOCIN) 300 MG capsule Take 300 mg by mouth 4 times daily      ferrous sulfate (IRON 325) 325 (65 Fe) MG tablet Take 325 mg by mouth 3 times daily (with meals)      loratadine (CLARITIN) 10 MG tablet Take 10 mg by mouth daily      neomycin-polymyxin-hydrocortisone (CORTISPORIN) 3.5-40917-9 otic suspension Place 4 drops in ear(s) 3 times daily      pantoprazole (PROTONIX) 40 MG tablet Take 40 mg by mouth every morning (before breakfast)      rivaroxaban (XARELTO) 10 MG TABS tablet Take 10 mg by mouth daily      fluticasone (FLONASE) 50 MCG/ACT nasal spray 1 spray by Nasal route 2 times daily      ketorolac 0.4 % SOLN ophthalmic solution instill 1 drop into right eye three times a day      tamsulosin (FLOMAX) 0.4 MG capsule take 1 capsule by mouth at bedtime      traZODone (DESYREL) 50 MG tablet TAKE 1/2 TO 2  TABLETS BY MOUTH AT BEDTIME AS NEEDED FOR SLEEP      diclofenac sodium (VOLTAREN) 1 % GEL Apply 2 g topically 4 times daily as needed for Pain 5 Tube 11    budesonide-formoterol (SYMBICORT) 160-4.5 MCG/ACT AERO Inhale 2 puffs into the lungs 2 times daily      benzonatate (TESSALON) 100 MG capsule as needed      NIFEdipine (PROCARDIA XL) 30 MG extended release tablet Take 30 mg by mouth daily      levothyroxine (SYNTHROID) 100 MCG tablet Take 100 mcg by mouth daily      QUEtiapine (SEROQUEL XR) 300 MG extended release tablet Take 300 mg by mouth daily      TRINTELLIX 20 MG TABS tablet Take 20 mg by mouth daily      aspirin 81 MG chewable tablet Take 81 mg by mouth      pravastatin (PRAVACHOL) 40 MG tablet Take 40 mg by mouth daily.  albuterol (PROVENTIL HFA;VENTOLIN HFA) 108 (90 BASE) MCG/ACT inhaler Inhale 2 puffs into the lungs every 6 hours as needed for Wheezing.          Past Medical History:   Diagnosis Date    Backache, unspecified     Bladder cancer ([address]     Chronic pain of left knee     had supartz x 2 with no relief from [address] COPD (chronic obstructive pulmonary disease) ([address]     Depression     Eye problem 06/2019    bleeding and swelling behind Rt eye    Hyperlipidemia     Hypothyroidism     Other pulmonary embolism and infarction     Tobacco use disorder     WPW syndrome        Past Surgical History:   Procedure Laterality Date    BLADDER SURGERY  october    CARDIAC CATHETERIZATION  2013    FINGER SURGERY Right 2006    index finger flexor tendon repair    FINGER SURGERY Left 2014    KNEE ARTHROSCOPY Left 2012    LUNG BIOPSY Left 2/4/2015    benign results    OTHER SURGICAL HISTORY  7/9/2014    L4/L5    SINUS SURGERY  11/04/2017    Dr. Marilee Pablo TISSUE BIOPSY  2014, 2010    THROAT SURGERY  5/3/16    Abdominal fat used to repair vocal cords    TONSILLECTOMY         Family History   Problem Relation Age of Onset    Cancer Father         lung    Heart Disease Mother        Social History     Socioeconomic History    Marital status:      Spouse name: None    Number of children: None    Years of education: None    Highest education level: None   Occupational History    None   Social Needs    Financial resource strain: None    Food insecurity     Worry: None     Inability: None    Transportation needs     Medical: None     Non-medical: None   Tobacco Use    Smoking status: Former Smoker     Packs/day: 0.50     Years: 46.00     Pack years: 23.00     Types: Cigarettes     Start date:      Quit date: 2020     Years since quittin.1    Smokeless tobacco: Never Used    Tobacco comment: Pt down to 1/2 ppd from 3 ppd, attempting to quit. Substance and Sexual Activity    Alcohol use: No    Drug use: No    Sexual activity: None   Lifestyle    Physical activity     Days per week: None     Minutes per session: None    Stress: None   Relationships    Social connections     Talks on phone: None     Gets together: None     Attends [employer] service: None     Active member of club or organization: None     Attends meetings of clubs or organizations: None     Relationship status: None    Intimate partner violence     Fear of current or ex partner: None     Emotionally abused: None     Physically abused: None     Forced sexual activity: None   Other Topics Concern    None   Social History Narrative    None     Review of Systems   Constitutional: Positive for fatigue. HENT: Negative. Eyes: Negative. Respiratory: Negative for shortness of breath. Cardiovascular: Negative for chest pain. Gastrointestinal: Negative for constipation. Musculoskeletal: Positive for arthralgias, back pain, myalgias and neck pain. Skin: Negative. Allergic/Immunologic: Negative for environmental allergies and food allergies. Neurological: Positive for weakness and numbness. Psychiatric/Behavioral: Positive for sleep disturbance. Objective:   Physical Exam  Constitutional:       General: He is not in acute distress. Appearance: He is well-developed. He is not ill-appearing, toxic-appearing or diaphoretic. HENT:      Head: Normocephalic and atraumatic.    Cardiovascular:

## 2021-04-24 LAB
6-ACETYLMORPHINE, UR: NOT DETECTED
7-AMINOCLONAZEPAM, URINE: NOT DETECTED
ALPHA-OH-ALPRAZ, URINE: NOT DETECTED
ALPHA-OH-MIDAZOLAM, URINE: NOT DETECTED
ALPRAZOLAM, URINE: NOT DETECTED
AMPHETAMINES, URINE: NOT DETECTED
BARBITURATES, URINE: NOT DETECTED
BENZOYLECGONINE, UR: NOT DETECTED
BUPRENORPHINE URINE: NOT DETECTED
CARISOPRODOL, UR: NOT DETECTED
CLONAZEPAM, URINE: NOT DETECTED
CODEINE, URINE: NOT DETECTED
CREATININE URINE: >400 MG/DL (ref 20–400)
DIAZEPAM, URINE: NOT DETECTED
EER PAIN MGT DRUG PANEL, HIGH RES/EMIT U: ABNORMAL
ETHYL GLUCURONIDE UR: ABNORMAL
FENTANYL URINE: NOT DETECTED
GABAPENTIN: NOT DETECTED
HYDROCODONE, URINE: PRESENT
HYDROMORPHONE, URINE: PRESENT
LORAZEPAM, URINE: NOT DETECTED
MARIJUANA METAB, UR: NOT DETECTED
MDA, UR: NOT DETECTED
MDEA, EVE, UR: NOT DETECTED
MDMA URINE: NOT DETECTED
MEPERIDINE METAB, UR: NOT DETECTED
METHADONE, URINE: NOT DETECTED
METHAMPHETAMINE, URINE: NOT DETECTED
METHYLPHENIDATE: NOT DETECTED
MIDAZOLAM, URINE: NOT DETECTED
MORPHINE URINE: NOT DETECTED
NALOXONE URINE: NOT DETECTED
NORBUPRENORPHINE, URINE: NOT DETECTED
NORDIAZEPAM, URINE: NOT DETECTED
NORFENTANYL, URINE: NOT DETECTED
NORHYDROCODONE, URINE: PRESENT
NOROXYCODONE, URINE: PRESENT
NOROXYMORPHONE, URINE: PRESENT
OXAZEPAM, URINE: NOT DETECTED
OXYCODONE URINE: PRESENT
OXYMORPHONE, URINE: PRESENT
PAIN MGT DRUG PANEL, HI RES, UR: ABNORMAL
PCP,URINE: NOT DETECTED
PHENTERMINE, UR: NOT DETECTED
PREGABALIN: NOT DETECTED
TAPENTADOL, URINE: NOT DETECTED
TAPENTADOL-O-SULFATE, URINE: NOT DETECTED
TEMAZEPAM, URINE: NOT DETECTED
TRAMADOL, URINE: NOT DETECTED
ZOLPIDEM METABOLITE (ZCA), URINE: NOT DETECTED
ZOLPIDEM, URINE: NOT DETECTED

## 2021-04-29 DIAGNOSIS — G89.18 POST-OP PAIN: Primary | ICD-10-CM

## 2021-04-29 RX ORDER — OXYCODONE AND ACETAMINOPHEN 7.5; 325 MG/1; MG/1
1 TABLET ORAL EVERY 4 HOURS PRN
Qty: 30 TABLET | Refills: 0 | Status: SHIPPED | OUTPATIENT
Start: 2021-04-29 | End: 2021-05-04 | Stop reason: SDUPTHER

## 2021-05-04 DIAGNOSIS — G89.18 POST-OP PAIN: ICD-10-CM

## 2021-05-04 RX ORDER — OXYCODONE AND ACETAMINOPHEN 7.5; 325 MG/1; MG/1
1 TABLET ORAL EVERY 4 HOURS PRN
Qty: 30 TABLET | Refills: 0 | Status: SHIPPED | OUTPATIENT
Start: 2021-05-04 | End: 2021-05-09

## 2021-05-12 DIAGNOSIS — S93.431D SYNDESMOTIC DISRUPTION OF RIGHT ANKLE, SUBSEQUENT ENCOUNTER: ICD-10-CM

## 2021-05-12 DIAGNOSIS — S82.401K CLOSED FRACTURE OF SHAFT OF RIGHT FIBULA WITH NONUNION, UNSPECIFIED FRACTURE MORPHOLOGY, SUBSEQUENT ENCOUNTER: Primary | ICD-10-CM

## 2021-05-18 ENCOUNTER — OFFICE VISIT (OUTPATIENT)
Dept: ORTHOPEDIC SURGERY | Age: 66
End: 2021-05-18
Payer: MEDICARE

## 2021-05-18 ENCOUNTER — HOSPITAL ENCOUNTER (OUTPATIENT)
Dept: GENERAL RADIOLOGY | Age: 66
Discharge: HOME | End: 2021-05-20
Payer: MEDICARE

## 2021-05-18 VITALS
HEIGHT: 72 IN | OXYGEN SATURATION: 95 % | SYSTOLIC BLOOD PRESSURE: 132 MMHG | WEIGHT: 262 LBS | HEART RATE: 86 BPM | BODY MASS INDEX: 35.49 KG/M2 | DIASTOLIC BLOOD PRESSURE: 80 MMHG

## 2021-05-18 DIAGNOSIS — S82.401K CLOSED FRACTURE OF SHAFT OF RIGHT FIBULA WITH NONUNION, UNSPECIFIED FRACTURE MORPHOLOGY, SUBSEQUENT ENCOUNTER: ICD-10-CM

## 2021-05-18 DIAGNOSIS — S82.401K CLOSED FRACTURE OF SHAFT OF RIGHT FIBULA WITH NONUNION, UNSPECIFIED FRACTURE MORPHOLOGY, SUBSEQUENT ENCOUNTER: Primary | ICD-10-CM

## 2021-05-18 DIAGNOSIS — S93.431D SYNDESMOTIC DISRUPTION OF RIGHT ANKLE, SUBSEQUENT ENCOUNTER: ICD-10-CM

## 2021-05-18 PROCEDURE — 99214 OFFICE O/P EST MOD 30 MIN: CPT | Performed by: NURSE PRACTITIONER

## 2021-05-18 PROCEDURE — 99024 POSTOP FOLLOW-UP VISIT: CPT | Performed by: NURSE PRACTITIONER

## 2021-05-18 PROCEDURE — L4387 NON-PNEUM WALK BOOT PRE OTS: HCPCS | Performed by: NURSE PRACTITIONER

## 2021-05-18 PROCEDURE — 73610 X-RAY EXAM OF ANKLE: CPT

## 2021-05-18 NOTE — PROGRESS NOTES
[address]         Progress and Procedure Note      Nicole Trejo  MEDICAL RECORD NUMBER:  [id number]  AGE: 60 y.o. GENDER: male  : 1955  EPISODE DATE:  2021    Subjective:     Chief Complaint   Patient presents with    Post-Op Check     Rt ankle surgery         HISTORY of PRESENT ILLNESS HPI     Nicole Trejo is a 60 y.o. male following up from an ORIF of the right fibular fracture with concomitant syndesmotic ligament disruption open repair with Dr. Félix Wade on 2021. Overall, patient is doing well at today's visit. Surgical incisions appear well coapted maintained. All remaining staples removed in office today. Patient was placed in a Betadine wet-to-dry dressing with Ace wrap compression. Patient was instructed to keep this dressing in place for 1 week. After 1 week patient may begin showering. No soaking it at this time. Patient did opt for a boot today rather than a cast.  Patient instructed to treat boot-like cast for the next 3 weeks. No medications were needed today. Patient is under contract with pain management. Patient will follow back up with us again at the 3-week partha.       PAST MEDICAL HISTORY        Diagnosis Date    Alpha-1-antitrypsin deficiency ([address]     Backache, unspecified     Bladder cancer ([address]     Chronic pain of left knee     had supartz x 2 with no relief from [address] COPD (chronic obstructive pulmonary disease) ([address]     Depression     Eye problem 2019    bleeding and swelling behind Rt eye    Hyperlipidemia     Hypothyroidism     Other pulmonary embolism and infarction     Tobacco use disorder     WPW syndrome        PAST SURGICAL HISTORY    Past Surgical History:   Procedure Laterality Date    BLADDER SURGERY  october    CARDIAC CATHETERIZATION  2013    FINGER SURGERY Right     index finger flexor tendon repair    FINGER SURGERY Left     KNEE ARTHROSCOPY Left     LUNG BIOPSY Left 2015    benign results    OTHER SURGICAL HISTORY  2014    L4/L5    SINUS SURGERY  2017    Dr. Prashant Heart TISSUE BIOPSY  2010    THROAT SURGERY  5/3/16    Abdominal fat used to repair vocal cords    TONSILLECTOMY         FAMILY HISTORY    Family History   Problem Relation Age of Onset    Cancer Father         lung    Heart Disease Mother        SOCIAL HISTORY    Social History     Tobacco Use    Smoking status: Former Smoker     Packs/day: 0.50     Years: 46.00     Pack years: 23.00     Types: Cigarettes     Start date:      Quit date: 2020     Years since quittin.2    Smokeless tobacco: Never Used    Tobacco comment: Pt down to 1/2 ppd from 3 ppd, attempting to quit. Vaping Use    Vaping Use: Never used   Substance Use Topics    Alcohol use: No    Drug use: No       ALLERGIES    Allergies   Allergen Reactions    Penicillins Shortness Of Breath    Mucinex [Guaifenesin Er] Diarrhea and Nausea And Vomiting    Cephalexin Hives    Chantix [Varenicline Tartrate] Other (See Comments)     Worsening depression    Gabapentin Other (See Comments)     Severe depression    Adhesive Tape Rash       MEDICATIONS    Current Outpatient Medications on File Prior to Visit   Medication Sig Dispense Refill    HYDROcodone-acetaminophen (NORCO)  MG per tablet Take 1 tablet by mouth every 6 hours as needed for Pain for up to 30 days. 120 tablet 0    oxyCODONE (OXYCONTIN) 30 MG T12A extended release tablet Take 30 mg by mouth every 12 hours for 30 days.  60 each 0    ascorbic acid (VITAMIN C) 500 MG tablet Take 500 mg by mouth daily      clindamycin (CLEOCIN) 300 MG capsule Take 300 mg by mouth 4 times daily      ferrous sulfate (IRON 325) 325 (65 Fe) MG tablet Take 325 mg by mouth 3 times daily (with meals)      loratadine (CLARITIN) 10 MG tablet Take 10 mg by mouth daily      neomycin-polymyxin-hydrocortisone (CORTISPORIN) 3.5-64526-1 otic suspension Place 4 drops in ear(s) 3 times daily      pantoprazole (PROTONIX) 40 MG tablet Take 40 mg by mouth every morning (before breakfast)      rivaroxaban (XARELTO) 10 MG TABS tablet Take 10 mg by mouth daily      fluticasone (FLONASE) 50 MCG/ACT nasal spray 1 spray by Nasal route 2 times daily      ketorolac 0.4 % SOLN ophthalmic solution instill 1 drop into right eye three times a day      tamsulosin (FLOMAX) 0.4 MG capsule take 1 capsule by mouth at bedtime      traZODone (DESYREL) 50 MG tablet TAKE 1/2 TO 2  TABLETS BY MOUTH AT BEDTIME AS NEEDED FOR SLEEP      budesonide-formoterol (SYMBICORT) 160-4.5 MCG/ACT AERO Inhale 2 puffs into the lungs 2 times daily      NIFEdipine (PROCARDIA XL) 30 MG extended release tablet Take 30 mg by mouth daily      levothyroxine (SYNTHROID) 100 MCG tablet Take 100 mcg by mouth daily      QUEtiapine (SEROQUEL XR) 300 MG extended release tablet Take 300 mg by mouth daily      TRINTELLIX 20 MG TABS tablet Take 20 mg by mouth daily      aspirin 81 MG chewable tablet Take 81 mg by mouth      pravastatin (PRAVACHOL) 40 MG tablet Take 40 mg by mouth daily.  diclofenac sodium (VOLTAREN) 1 % GEL Apply 2 g topically 4 times daily as needed for Pain (Patient not taking: Reported on 5/18/2021) 5 Tube 11    benzonatate (TESSALON) 100 MG capsule as needed (Patient not taking: Reported on 5/18/2021)      albuterol (PROVENTIL HFA;VENTOLIN HFA) 108 (90 BASE) MCG/ACT inhaler Inhale 2 puffs into the lungs every 6 hours as needed for Wheezing. (Patient not taking: Reported on 5/18/2021)       No current facility-administered medications on file prior to visit.        Review of Systems  General: (-) weight change, fatigue, weakness, fever, chills, night sweats  Head: (-) trauma, heacache location, frequency, nausea, vomiting, visual changes  Eyes: (-) glasses, contact lenses, blurriness, tearing, itching, acute visual changes  Ears: (-) hearing loss, tinnitus, vertigo, discharge, earache  Skin: (-) rash, subcutaneous lesion, open ulceration      Objective:      /80   Pulse 86   Ht 6' (1.829 m)   Wt 262 lb (118.8 kg)   SpO2 95%   BMI 35.53 kg/m²     Wt Readings from Last 3 Encounters:   05/18/21 262 lb (118.8 kg)   04/19/21 262 lb (118.8 kg)   03/30/21 250 lb (113.4 kg)       PHYSICAL EXAMINATION  CONSTITUTIONAL:  Awake, alert, cooperative, no apparent distress, and appears stated age  Vascular: DP and PT pulses are palpable to the right lower extremity. Cap refill less than 5 seconds. Skin was warm and within normal limits. Mild amount of edema still noted within that right lower extremity. Dermatologic: Surgical incisions appear well coapted maintained. Staples are dry and intact. No redness or drainage of note today. Neurovascular: Epicritic and protopathic sensations are grossly intact to the right lower extremity. Musculoskeletal: Overall, foot and ankle appear rectus in nature. Patient continues have mild to moderate pain with palpation to manipulation of the right ankle joint. We are going to keep patient nonweightbearing for an additional 3 weeks. Muscle strength testing and range of motion was deferred due to recent surgery. Imaging:  MRI   [facility]ri obtained through [facility] displays a nonunion oblique fibular fracture with concomitant distal tib-fib/syndesmotic ligament disruption. CBC: No results found for: WBC, HGB, HCT, MCV, PLT  BMP: No results found for: NA, K, CL, CO2, PHOS, BUN, CREATININE  PT/INR:   Lab Results   Component Value Date    PROTIME 21.4 03/31/2015    INR 2.0 03/31/2015     Prealbumin: No results found for: PREALBUMIN  Albumin:No results found for: LABALBU  Sed Rate:No results found for: SEDRATE  CRP: No results found for: CRP  Micro: No results found for: BC   Hemoglobin A1C: No results found for: LABA1C    Assessment:      Diagnosis Orders   1.  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 Overall, patient is doing well in the postop setting. No medications were given today. Patient is under contract with pain management. All remaining staples were removed in office today. Patient was placed into Betadine with dry dressing. Patient to keep this dressing in place for 1 week. After 1 week patient may begin showering. No soaking at this time. Patient was dispensed offloading pneumatic fracture boot. Patient to treat boot-like cast for the next 3 weeks. Patient will follow back up with us again in 3 weeks. SAHARA Andrews - [address] [facility] [place]     Electronically signed by SAHARA Andrews CNP on 5/18/2021 at 5:05 PM         Procedures    Non-pneum walk boot pre ots     Patient was prescribed a [employer]. The right foot/ankle will require stabilization / immobilization from this semi-rigid / rigid orthosis to improve their function. The orthosis will assist in protecting the affected area, provide functional support and facilitate healing. The patient was educated and fit by a healthcare professional with expert knowledge and specialization in brace application while under the direct supervision of the physician. Verbal and written instructions for the use of and application of this item were provided.    They were instructed to contact the office immediately should the brace result in increased pain, decreased sensation, increased swelling or worsening of the condition.

## 2021-05-20 DIAGNOSIS — M54.42 CHRONIC BILATERAL LOW BACK PAIN WITH LEFT-SIDED SCIATICA: ICD-10-CM

## 2021-05-20 DIAGNOSIS — M54.2 CHRONIC CERVICAL PAIN: ICD-10-CM

## 2021-05-20 DIAGNOSIS — G89.29 CHRONIC CERVICAL PAIN: ICD-10-CM

## 2021-05-20 DIAGNOSIS — G89.29 CHRONIC BILATERAL LOW BACK PAIN WITH LEFT-SIDED SCIATICA: ICD-10-CM

## 2021-05-20 DIAGNOSIS — M17.12 PRIMARY OSTEOARTHRITIS OF LEFT KNEE: ICD-10-CM

## 2021-05-20 RX ORDER — HYDROCODONE BITARTRATE AND ACETAMINOPHEN 10; 325 MG/1; MG/1
1 TABLET ORAL EVERY 6 HOURS PRN
Qty: 120 TABLET | Refills: 0 | Status: SHIPPED | OUTPATIENT
Start: 2021-05-21 | End: 2021-06-16 | Stop reason: SDUPTHER

## 2021-05-20 RX ORDER — OXYCODONE HYDROCHLORIDE 30 MG/1
30 TABLET, FILM COATED, EXTENDED RELEASE ORAL EVERY 12 HOURS
Qty: 60 EACH | Refills: 0 | Status: SHIPPED | OUTPATIENT
Start: 2021-05-21 | End: 2021-06-16 | Stop reason: SDUPTHER

## 2021-05-20 NOTE — TELEPHONE ENCOUNTER
Pt called refill line for his [place] and oxy sent to clinic pharmacy. Please call pt when sent. Last Appt:  4/19/2021  Next Appt:   Visit date not found  Med verified in [address] checked for [place], [place], and [place]: 4/23/21 oxycontin er 30mg #60. Due 5/23/21. [place] checked [place], and [place]: 4/23/21 hydrocodone acetaminophen 10-325mg #120. Due 5/23/21. Medications pended for 5/21/21 due to clinic pharmacy hours.

## 2021-06-02 DIAGNOSIS — S82.401K CLOSED FRACTURE OF SHAFT OF RIGHT FIBULA WITH NONUNION, UNSPECIFIED FRACTURE MORPHOLOGY, SUBSEQUENT ENCOUNTER: Primary | ICD-10-CM

## 2021-06-08 ENCOUNTER — OFFICE VISIT (OUTPATIENT)
Dept: ORTHOPEDIC SURGERY | Age: 66
End: 2021-06-08
Payer: MEDICARE

## 2021-06-08 ENCOUNTER — HOSPITAL ENCOUNTER (OUTPATIENT)
Dept: GENERAL RADIOLOGY | Age: 66
Discharge: HOME | End: 2021-06-10
Payer: MEDICARE

## 2021-06-08 VITALS
DIASTOLIC BLOOD PRESSURE: 76 MMHG | BODY MASS INDEX: 35.21 KG/M2 | HEART RATE: 87 BPM | HEIGHT: 72 IN | SYSTOLIC BLOOD PRESSURE: 116 MMHG | WEIGHT: 260 LBS

## 2021-06-08 DIAGNOSIS — S93.431D SYNDESMOTIC DISRUPTION OF RIGHT ANKLE, SUBSEQUENT ENCOUNTER: ICD-10-CM

## 2021-06-08 DIAGNOSIS — S82.401K CLOSED FRACTURE OF SHAFT OF RIGHT FIBULA WITH NONUNION, UNSPECIFIED FRACTURE MORPHOLOGY, SUBSEQUENT ENCOUNTER: Primary | ICD-10-CM

## 2021-06-08 DIAGNOSIS — S82.401K CLOSED FRACTURE OF SHAFT OF RIGHT FIBULA WITH NONUNION, UNSPECIFIED FRACTURE MORPHOLOGY, SUBSEQUENT ENCOUNTER: ICD-10-CM

## 2021-06-08 PROCEDURE — 73610 X-RAY EXAM OF ANKLE: CPT

## 2021-06-08 PROCEDURE — L1902 AFO ANKLE GAUNTLET PRE OTS: HCPCS | Performed by: PODIATRIST

## 2021-06-08 PROCEDURE — 99214 OFFICE O/P EST MOD 30 MIN: CPT | Performed by: PODIATRIST

## 2021-06-08 PROCEDURE — 99024 POSTOP FOLLOW-UP VISIT: CPT | Performed by: PODIATRIST

## 2021-06-16 DIAGNOSIS — G89.29 CHRONIC CERVICAL PAIN: ICD-10-CM

## 2021-06-16 DIAGNOSIS — M54.42 CHRONIC BILATERAL LOW BACK PAIN WITH LEFT-SIDED SCIATICA: ICD-10-CM

## 2021-06-16 DIAGNOSIS — M54.2 CHRONIC CERVICAL PAIN: ICD-10-CM

## 2021-06-16 DIAGNOSIS — M17.12 PRIMARY OSTEOARTHRITIS OF LEFT KNEE: ICD-10-CM

## 2021-06-16 DIAGNOSIS — G89.29 CHRONIC BILATERAL LOW BACK PAIN WITH LEFT-SIDED SCIATICA: ICD-10-CM

## 2021-06-16 NOTE — TELEPHONE ENCOUNTER
[place] checked [place], and [place]: 5/21/21 norco 10-325mg #120. Due 6/20/21.          5/21/21 oxycontin ER 30mg #60. Due 6/20/21.     Last Appt:  4/19/2021  Next Appt:   Visit date not found  Med verified in [facility]

## 2021-06-17 ENCOUNTER — TELEPHONE (OUTPATIENT)
Dept: PAIN MANAGEMENT | Age: 66
End: 2021-06-17

## 2021-06-17 RX ORDER — HYDROCODONE BITARTRATE AND ACETAMINOPHEN 10; 325 MG/1; MG/1
1 TABLET ORAL EVERY 6 HOURS PRN
Qty: 120 TABLET | Refills: 0 | Status: SHIPPED | OUTPATIENT
Start: 2021-06-18 | End: 2021-07-13 | Stop reason: SDUPTHER

## 2021-06-17 RX ORDER — OXYCODONE HYDROCHLORIDE 30 MG/1
30 TABLET, FILM COATED, EXTENDED RELEASE ORAL EVERY 12 HOURS
Qty: 60 EACH | Refills: 0 | Status: SHIPPED | OUTPATIENT
Start: 2021-06-18 | End: 2021-07-13 | Stop reason: SDUPTHER

## 2021-06-17 NOTE — TELEPHONE ENCOUNTER
Pt called and was scheduled for Euflexxa injects w/ first one on 7/1/21.     Writer unsure if anything needs to be done prior to injection day, ex--MIRIAN wilson

## 2021-06-29 ENCOUNTER — TELEPHONE (OUTPATIENT)
Dept: PAIN MANAGEMENT | Age: 66
End: 2021-06-29

## 2021-06-29 NOTE — TELEPHONE ENCOUNTER
The patient has been cancelled for his Euflexxa because he was denied. Currently there are no samples available, the office will be in contact with the rep but we are not sure how long before these are available.      He will see Flora Quintanilla for a follow up 7/2/21

## 2021-06-30 NOTE — TELEPHONE ENCOUNTER
The patients Euflexxa was denied. There are no samples for him at this time.      He will see Ariana Collazo on 7/1/21 for a follow up

## 2021-07-01 ENCOUNTER — OFFICE VISIT (OUTPATIENT)
Dept: PAIN MANAGEMENT | Age: 66
End: 2021-07-01
Payer: MEDICARE

## 2021-07-01 VITALS
SYSTOLIC BLOOD PRESSURE: 94 MMHG | WEIGHT: 261 LBS | DIASTOLIC BLOOD PRESSURE: 62 MMHG | HEIGHT: 72 IN | BODY MASS INDEX: 35.35 KG/M2 | RESPIRATION RATE: 16 BRPM

## 2021-07-01 DIAGNOSIS — G62.9 NEUROPATHY: Primary | ICD-10-CM

## 2021-07-01 DIAGNOSIS — M54.42 CHRONIC BILATERAL LOW BACK PAIN WITH LEFT-SIDED SCIATICA: ICD-10-CM

## 2021-07-01 DIAGNOSIS — G89.29 CHRONIC PAIN OF LEFT KNEE: ICD-10-CM

## 2021-07-01 DIAGNOSIS — M25.562 CHRONIC PAIN OF LEFT KNEE: ICD-10-CM

## 2021-07-01 DIAGNOSIS — G89.29 CHRONIC CERVICAL PAIN: ICD-10-CM

## 2021-07-01 DIAGNOSIS — G89.29 CHRONIC BILATERAL LOW BACK PAIN WITH LEFT-SIDED SCIATICA: ICD-10-CM

## 2021-07-01 DIAGNOSIS — M17.12 PRIMARY OSTEOARTHRITIS OF LEFT KNEE: ICD-10-CM

## 2021-07-01 DIAGNOSIS — M54.2 CHRONIC CERVICAL PAIN: ICD-10-CM

## 2021-07-01 PROCEDURE — 99214 OFFICE O/P EST MOD 30 MIN: CPT | Performed by: NURSE PRACTITIONER

## 2021-07-01 PROCEDURE — 99213 OFFICE O/P EST LOW 20 MIN: CPT | Performed by: NURSE PRACTITIONER

## 2021-07-01 RX ORDER — LEVOTHYROXINE SODIUM 0.12 MG/1
125 TABLET ORAL DAILY
COMMUNITY
Start: 2021-06-23

## 2021-07-01 RX ORDER — ALPHA1-PROTEINASE INHIBITOR (HUMAN) 1000 MG/20ML
INJECTION, SOLUTION INTRAVENOUS WEEKLY
COMMUNITY
Start: 2021-06-15

## 2021-07-01 ASSESSMENT — ENCOUNTER SYMPTOMS
CONSTIPATION: 0
BACK PAIN: 1
SHORTNESS OF BREATH: 0
EYES NEGATIVE: 1

## 2021-07-01 NOTE — PROGRESS NOTES
Writer has reached out to Joe Arevalo our rep to see if we can get some more.   Will let pt know when we receive them

## 2021-07-01 NOTE — PROGRESS NOTES
Subjective:      Patient ID: Aliza Nova is a 60 y.o. male. Chief Complaint   Patient presents with    Knee Pain     left knee     Injections     Euflexxa was denied by insurance; as we are out of network. No samples available at this time     Ankle Pain   Associated symptoms include numbness. Knee Pain   Associated symptoms include numbness. Back Pain  Associated symptoms include numbness and weakness. Pertinent negatives include no chest pain. Other  Associated symptoms include arthralgias, fatigue, myalgias, neck pain, numbness and weakness. Pertinent negatives include no chest pain. Here today for routine pain clinic recheck, medication management    Pain reduced on current medications, denies side effects, maintaining ADL's. Increased left knee pain, swelling, stiffness likely due to overuse due to somewhat recent right ankle surgery    ADVERSE MEDICATION EFFECTS:   Nausea and vomiting: no   Constipation: no-Undercontrol-: no  Dizziness/drowsy/sleepy--not applicable  Urinary Retention: no    ACTIVITY/SOCIAL/EMOTIONAL:  Sleep Pattern: 6 hours per night. generally restful sleep  Home Exercises: daily walking  Activity:unchanged  Emotional Issues: normal.   Currently seeing a Psychiatrist or Psychologist:  No      ABERRANT BEHAVIORS SINCE LAST VISIT  Lost rx/pills:------------------------------------------ no  Taking  medication as prescribed: ----------- no  Urine Drug Screen ---------------------------------  yes  Recent ER visits: -------------------------------------No  Pill count is appropriate: ---------------------------not applicable   Refills for prescriptions appropriate:---------- yes      Pain Assessment  Location of Pain: Knee  Location Modifiers: Left  Severity of Pain: 6  Quality of Pain: Throbbing, Sharp, Dull, Aching, Cracking, Grinding  Duration of Pain: Persistent  Frequency of Pain: Constant  Aggravating Factors:  Other (Comment) (movment with the knee)  Limiting Behavior: Yes    Allergies   Allergen Reactions    Penicillins Shortness Of Breath    Mucinex [Guaifenesin Er] Diarrhea and Nausea And Vomiting    Cephalexin Hives    Chantix [Varenicline Tartrate] Other (See Comments)     Worsening depression    Gabapentin Other (See Comments)     Severe depression    Adhesive Tape Rash       Outpatient Medications Marked as Taking for the 7/1/21 encounter (Office Visit) with SAHARA Betts - CNP   Medication Sig Dispense Refill    HYDROcodone-acetaminophen (NORCO)  MG per tablet Take 1 tablet by mouth every 6 hours as needed for Pain for up to 30 days. 120 tablet 0    oxyCODONE (OXYCONTIN) 30 MG T12A extended release tablet Take 30 mg by mouth every 12 hours for 30 days.  60 each 0    ascorbic acid (VITAMIN C) 500 MG tablet Take 500 mg by mouth daily      ferrous sulfate (IRON 325) 325 (65 Fe) MG tablet Take 325 mg by mouth 3 times daily (with meals)      loratadine (CLARITIN) 10 MG tablet Take 10 mg by mouth daily      neomycin-polymyxin-hydrocortisone (CORTISPORIN) 3.5-42165-8 otic suspension Place 4 drops in ear(s) 3 times daily      pantoprazole (PROTONIX) 40 MG tablet Take 40 mg by mouth every morning (before breakfast)      rivaroxaban (XARELTO) 10 MG TABS tablet Take 10 mg by mouth daily      fluticasone (FLONASE) 50 MCG/ACT nasal spray 1 spray by Nasal route 2 times daily      ketorolac 0.4 % SOLN ophthalmic solution instill 1 drop into right eye three times a day      tamsulosin (FLOMAX) 0.4 MG capsule take 1 capsule by mouth at bedtime      traZODone (DESYREL) 50 MG tablet TAKE 1/2 TO 2  TABLETS BY MOUTH AT BEDTIME AS NEEDED FOR SLEEP      budesonide-formoterol (SYMBICORT) 160-4.5 MCG/ACT AERO Inhale 2 puffs into the lungs 2 times daily      benzonatate (TESSALON) 100 MG capsule as needed       NIFEdipine (PROCARDIA XL) 30 MG extended release tablet Take 30 mg by mouth daily      QUEtiapine (SEROQUEL XR) 300 MG extended release tablet Take 300 mg down to 1/2 ppd from 3 ppd, attempting to quit. Vaping Use    Vaping Use: Never used   Substance and Sexual Activity    Alcohol use: No    Drug use: No    Sexual activity: None   Other Topics Concern    None   Social History Narrative    None     Social Determinants of Health     Financial Resource Strain:     Difficulty of Paying Living Expenses:    Food Insecurity:     Worried About Running Out of Food in the Last Year:     [address] in the Last Year:    Transportation Needs:     Lack of Transportation (Medical):  Lack of Transportation (Non-Medical):    Physical Activity:     Days of Exercise per Week:     Minutes of Exercise per Session:    Stress:     Feeling of Stress :    Social Connections:     Frequency of Communication with Friends and Family:     Frequency of Social Gatherings with Friends and Family:     Attends Confucianist Services:     Active Member of Clubs or Organizations:     Attends Club or Organization Meetings:     Marital Status:    Intimate Partner Violence:     Fear of Current or Ex-Partner:     Emotionally Abused:     Physically Abused:     Sexually Abused:      Review of Systems   Constitutional: Positive for fatigue. HENT: Negative. Eyes: Negative. Respiratory: Negative for shortness of breath. Cardiovascular: Negative for chest pain. Gastrointestinal: Negative for constipation. Musculoskeletal: Positive for arthralgias, back pain, myalgias and neck pain. Skin: Negative. Allergic/Immunologic: Negative for environmental allergies and food allergies. Neurological: Positive for weakness and numbness. Psychiatric/Behavioral: Positive for sleep disturbance. Objective:   Physical Exam  Constitutional:       General: He is not in acute distress. Appearance: He is well-developed. He is not ill-appearing, toxic-appearing or diaphoretic. HENT:      Head: Normocephalic and atraumatic.    Cardiovascular:      Rate and Rhythm: Normal rate.      Pulses: Normal pulses. Comments: Warm extremities. Normal capillary refill. Pulmonary:      Effort: Pulmonary effort is normal.   Skin:     General: Skin is warm and dry. Coloration: Skin is not pale. Neurological:      Mental Status: He is alert and oriented to person, place, and time. Cranial Nerves: No cranial nerve deficit. Sensory: No sensory deficit. Motor: No atrophy or abnormal muscle tone. Deep Tendon Reflexes: Reflexes are normal and symmetric. Psychiatric:         Mood and Affect: Affect is not angry. Speech: Speech normal.         Behavior: Behavior normal. Behavior is not agitated, aggressive or withdrawn. Behavior is cooperative. Judgment: Judgment normal. Judgment is not impulsive or inappropriate. Assessment:      1. Neuropathy    2. Chronic bilateral low back pain with left-sided sciatica    3. Chronic cervical pain    4. Primary osteoarthritis of left knee    5. Chronic pain of left knee          Plan:     Chronic pain diagnoses such as   1. Neuropathy    2. Chronic bilateral low back pain with left-sided sciatica    3. Chronic cervical pain    4. Primary osteoarthritis of left knee    5. Chronic pain of left knee     controlled on current medication regime, wll continue current pain medications to improve quality of life and function. SOAPP- the score is 4 (Values indicates patient is <4minimal potential 4-7 Moderate potential >7 High potential for drug addiction)    Controlled Substances Monitoring:     RX Monitoring 4/19/2021   Attestation -   Periodic Controlled Substance Monitoring No signs of potential drug abuse or diversion identified.;Obtaining appropriate analgesic effect of treatment. ;Random urine drug screen sent today.    Chronic Pain > 50 MEDD -   Chronic Pain > 80 MEDD -   Chronic Pain > 120 MEDD (historical values) -   Chronic Pain > 120 MEDD -           Follow up two months

## 2021-07-07 DIAGNOSIS — S82.401K CLOSED FRACTURE OF SHAFT OF RIGHT FIBULA WITH NONUNION, UNSPECIFIED FRACTURE MORPHOLOGY, SUBSEQUENT ENCOUNTER: Primary | ICD-10-CM

## 2021-07-13 ENCOUNTER — OFFICE VISIT (OUTPATIENT)
Dept: ORTHOPEDIC SURGERY | Age: 66
End: 2021-07-13
Payer: MEDICARE

## 2021-07-13 ENCOUNTER — HOSPITAL ENCOUNTER (OUTPATIENT)
Dept: GENERAL RADIOLOGY | Age: 66
Discharge: HOME | End: 2021-07-15
Payer: MEDICARE

## 2021-07-13 VITALS
WEIGHT: 261 LBS | HEIGHT: 72 IN | SYSTOLIC BLOOD PRESSURE: 137 MMHG | BODY MASS INDEX: 35.35 KG/M2 | HEART RATE: 69 BPM | DIASTOLIC BLOOD PRESSURE: 81 MMHG

## 2021-07-13 DIAGNOSIS — M54.42 CHRONIC BILATERAL LOW BACK PAIN WITH LEFT-SIDED SCIATICA: ICD-10-CM

## 2021-07-13 DIAGNOSIS — G89.29 CHRONIC CERVICAL PAIN: ICD-10-CM

## 2021-07-13 DIAGNOSIS — M17.12 PRIMARY OSTEOARTHRITIS OF LEFT KNEE: ICD-10-CM

## 2021-07-13 DIAGNOSIS — M25.371 RIGHT ANKLE INSTABILITY: ICD-10-CM

## 2021-07-13 DIAGNOSIS — G89.29 CHRONIC BILATERAL LOW BACK PAIN WITH LEFT-SIDED SCIATICA: ICD-10-CM

## 2021-07-13 DIAGNOSIS — G89.29 CHRONIC PAIN OF RIGHT ANKLE: ICD-10-CM

## 2021-07-13 DIAGNOSIS — Z09 POSTOPERATIVE FOLLOW-UP: ICD-10-CM

## 2021-07-13 DIAGNOSIS — M54.2 CHRONIC CERVICAL PAIN: ICD-10-CM

## 2021-07-13 DIAGNOSIS — M25.571 CHRONIC PAIN OF RIGHT ANKLE: ICD-10-CM

## 2021-07-13 DIAGNOSIS — S82.401K CLOSED FRACTURE OF SHAFT OF RIGHT FIBULA WITH NONUNION, UNSPECIFIED FRACTURE MORPHOLOGY, SUBSEQUENT ENCOUNTER: Primary | ICD-10-CM

## 2021-07-13 DIAGNOSIS — S82.401K CLOSED FRACTURE OF SHAFT OF RIGHT FIBULA WITH NONUNION, UNSPECIFIED FRACTURE MORPHOLOGY, SUBSEQUENT ENCOUNTER: ICD-10-CM

## 2021-07-13 PROCEDURE — 99214 OFFICE O/P EST MOD 30 MIN: CPT | Performed by: NURSE PRACTITIONER

## 2021-07-13 PROCEDURE — 73610 X-RAY EXAM OF ANKLE: CPT

## 2021-07-13 PROCEDURE — 99024 POSTOP FOLLOW-UP VISIT: CPT | Performed by: NURSE PRACTITIONER

## 2021-07-13 RX ORDER — HYDROCODONE BITARTRATE AND ACETAMINOPHEN 10; 325 MG/1; MG/1
1 TABLET ORAL EVERY 6 HOURS PRN
Qty: 120 TABLET | Refills: 0 | Status: SHIPPED | OUTPATIENT
Start: 2021-07-13 | End: 2021-08-10 | Stop reason: SDUPTHER

## 2021-07-13 RX ORDER — OXYCODONE HYDROCHLORIDE 30 MG/1
30 TABLET, FILM COATED, EXTENDED RELEASE ORAL EVERY 12 HOURS
Qty: 60 EACH | Refills: 0 | Status: SHIPPED | OUTPATIENT
Start: 2021-07-13 | End: 2021-08-10 | Stop reason: SDUPTHER

## 2021-07-13 NOTE — TELEPHONE ENCOUNTER
Last Appt:  7/1/2021  Next Appt:   9/2/2021  Med verified in [facility] checked for [place], and [place]:  norco 10 /325 mg  6/18/21   #120  Tabs  . Due 7/18/21. OAR[place] Report checked for [place], and [place]:oxycontin 30 mg  6/18/21   #60  Tabs . Due 7/18/21.

## 2021-07-16 NOTE — PROGRESS NOTES
[address]         Progress and Procedure Note      Debra Driver  MEDICAL RECORD NUMBER:  [id number]  AGE: 60 y.o. GENDER: male  : 1955  EPISODE DATE:  2021    Subjective:     Chief Complaint   Patient presents with    Ankle Injury     rech right ankle fracture         HISTORY of PRESENT ILLNESS HPI    Patient is a pleasant 29-year-old male following up from surgical reconstruction of the left ankle consisting of an ORIF of the fibular nonunion with concomitant syndesmotic ligament reconstruction dated 2021. Patient overall is doing and states his pain is minimal at this time. Patient states he does have worsening pain when walking and working outside. Patient does present to the office today without wearing his ASO lace up ankle brace. Did instruct patient I would like him to stay in this he is at least 6 months postoperatively. Patient is still noted to have misalignment of his right foot and ankle. When patient walks his right foot does kick out laterally. Delma with patient potential need going forward of an Achilles tendon lengthening versus tendon transfer. Patient recently completed 6 weeks of physical therapy and did well. Still noted to have tight Achilles complex to that right lower extremity. Patient courage to continue doing his stretches and exercises that he was working with in physical therapy. Patient will follow back up with us again at the 6-week partha. Updated x-rays were obtained reviewed with patient.     PAST MEDICAL HISTORY        Diagnosis Date    Alpha-1-antitrypsin deficiency ([address]     Backache, unspecified     Bladder cancer ([address]     Chronic pain of left knee     had supartz x 2 with no relief from [address] COPD (chronic obstructive pulmonary disease) ([address]     Depression     Eye problem 2019    bleeding and swelling behind Rt eye    Hyperlipidemia     Hypothyroidism     Other pulmonary embolism and infarction     Tobacco use disorder     WPW syndrome        PAST SURGICAL HISTORY    Past Surgical History:   Procedure Laterality Date    BLADDER SURGERY  october    CARDIAC CATHETERIZATION      FINGER SURGERY Right     index finger flexor tendon repair    FINGER SURGERY Left 2014    KNEE ARTHROSCOPY Left     LUNG BIOPSY Left 2015    benign results    OTHER SURGICAL HISTORY  2014    L4/L5    SINUS SURGERY  2017    Dr. Roshan Estevez TISSUE BIOPSY  2010    THROAT SURGERY  5/3/16    Abdominal fat used to repair vocal cords    TONSILLECTOMY         FAMILY HISTORY    Family History   Problem Relation Age of Onset    Cancer Father         lung    Heart Disease Mother        SOCIAL HISTORY    Social History     Tobacco Use    Smoking status: Former Smoker     Packs/day: 0.50     Years: 46.00     Pack years: 23.00     Types: Cigarettes     Start date:      Quit date: 2020     Years since quittin.4    Smokeless tobacco: Never Used    Tobacco comment: Pt down to 1/2 ppd from 3 ppd, attempting to quit.    Vaping Use    Vaping Use: Never used   Substance Use Topics    Alcohol use: No    Drug use: No       ALLERGIES    Allergies   Allergen Reactions    Penicillins Shortness Of Breath    Mucinex [Guaifenesin Er] Diarrhea and Nausea And Vomiting    Cephalexin Hives    Chantix [Varenicline Tartrate] Other (See Comments)     Worsening depression    Gabapentin Other (See Comments)     Severe depression    Adhesive Tape Rash       MEDICATIONS    Current Outpatient Medications on File Prior to Visit   Medication Sig Dispense Refill    levothyroxine (SYNTHROID) 125 MCG tablet Take 125 mcg by mouth daily      tiotropium (SPIRIVA) 18 MCG inhalation capsule Inhale 1 capsule into the lungs daily      cyanocobalamin 1000 MCG tablet Take 1,000 mcg by mouth daily      PROLASTIN-C 1000 MG/20ML SOLN IVPB Infuse intravenously once a week      ascorbic acid (VITAMIN C) 500 subcutaneous lesion, open ulceration      Objective:      /81   Pulse 69   Ht 6' (1.829 m)   Wt 261 lb (118.4 kg)   BMI 35.40 kg/m²     Wt Readings from Last 3 Encounters:   07/13/21 261 lb (118.4 kg)   07/01/21 261 lb (118.4 kg)   06/08/21 260 lb (117.9 kg)       PHYSICAL EXAMINATION  CONSTITUTIONAL:  Awake, alert, cooperative, no apparent distress, and appears stated age  Vascular: DP and PT pulses are palpable to the right lower extremity. Cap refill less than 5 seconds. Skin was warm and within normal limits. Mild amount of edema still noted within that right lower extremity. Dermatologic: Previous incision sites appear well coapted and healed. No further acute soft tissue manifestations of note. Neurovascular: Epicritic and protopathic sensations are grossly intact to the right lower extremity. Musculoskeletal: 5 of 5 muscle strength to all extrinsic muscle groups tested. Continues to have some mild stiffness with range of motion of the tibiotalar articulation. Overall alignment appears maintained. No further acute bony normalities of note.     Imaging:  Narrative   EXAMINATION:   THREE XRAY VIEWS OF THE RIGHT ANKLE       7/13/2021 10:11 am       COMPARISON:   06/08/2021       HISTORY:   ORDERING SYSTEM PROVIDED HISTORY: Closed fracture of shaft of right fibula   with nonunion, unspecified fracture morphology, subsequent encounter   TECHNOLOGIST PROVIDED HISTORY:   Reason for Exam: F/u orif   Acuity: Acute   Type of Exam: Subsequent/Follow-up       FINDINGS:   Compression plate and screws are noted       in situ in the distal fibula.  Tunnel band fixator is present between the       distal fibula and tibia attached by a button on the medial aspect of the       distal tibia.  Hardware is intact without complication.  Persistent soft   tissue swelling is noted.  Talar dome and talar walls are intact.           Impression   Status post ORIF of the distal fibula.              CBC: No results found for: WBC, HGB, HCT, MCV, PLT  BMP: No results found for: NA, K, CL, CO2, PHOS, BUN, CREATININE  PT/INR:   Lab Results   Component Value Date    PROTIME 21.4 03/31/2015    INR 2.0 03/31/2015     Prealbumin: No results found for: PREALBUMIN  Albumin:No results found for: LABALBU  Sed Rate:No results found for: SEDRATE  CRP: No results found for: CRP  Micro: No results found for: BC   Hemoglobin A1C: No results found for: LABA1C    Assessment:      Diagnosis Orders   1. 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 Chronic pain of right ankle     3. Right ankle instability           Patient Active Problem List   Diagnosis    Chronic left-sided low back pain with left-sided sciatica    Chronic cervical pain    Neuropathy    Chronic myofascial pain    Osteoarthritis of left knee    Opiate analgesic use agreement exists    Encounter for drug screening    Chronic pain of left knee    Encounter for long-term opiate analgesic use    Bladder cancer [facility])         Procedure Note  N/A    Plan:     Patient examined and evaluated today. Updated plain film x-rays were obtained reviewed in detail discussed at length. Patient was instructed to get back into his ASO lace up ankle brace. We would like to keep patient in this brace until he is at least 6 months postoperatively. Patient continues to have an outward turning of his right foot and ankle with ambulation. Did discuss with patient that if this does not resolve that he may ultimately benefit from a right Achilles tendon lengthening due to ongoing tightness of that tendon or a tendon transfer. Patient encouraged to continue using his compression socks daily. Patient will follow back up with us again at the 6-week partha.       Joaquim Arcos, [facility]     Electronically signed by SAHARA Lopez CNP on 7/16/2021 at 1:22 PM

## 2021-08-10 DIAGNOSIS — G89.29 CHRONIC BILATERAL LOW BACK PAIN WITH LEFT-SIDED SCIATICA: ICD-10-CM

## 2021-08-10 DIAGNOSIS — M54.2 CHRONIC CERVICAL PAIN: ICD-10-CM

## 2021-08-10 DIAGNOSIS — M54.42 CHRONIC BILATERAL LOW BACK PAIN WITH LEFT-SIDED SCIATICA: ICD-10-CM

## 2021-08-10 DIAGNOSIS — G89.29 CHRONIC CERVICAL PAIN: ICD-10-CM

## 2021-08-10 DIAGNOSIS — M17.12 PRIMARY OSTEOARTHRITIS OF LEFT KNEE: ICD-10-CM

## 2021-08-10 NOTE — TELEPHONE ENCOUNTER
OAR[place] Report checked for [place], and [place]: 7/16/21 norco 10-325mg #120. Due 8/15/21.          7/16/21 oxycontin #60. Due 8/15/21. The patient filled early last month, he should not need medication until 8/16/21.     Last Appt:  7/1/2021  Next Appt:   9/2/2021  Med verified in Epic

## 2021-08-11 RX ORDER — OXYCODONE HYDROCHLORIDE 30 MG/1
30 TABLET, FILM COATED, EXTENDED RELEASE ORAL EVERY 12 HOURS
Qty: 60 EACH | Refills: 0 | Status: SHIPPED | OUTPATIENT
Start: 2021-08-16 | End: 2021-09-03 | Stop reason: SDUPTHER

## 2021-08-11 RX ORDER — HYDROCODONE BITARTRATE AND ACETAMINOPHEN 10; 325 MG/1; MG/1
1 TABLET ORAL EVERY 6 HOURS PRN
Qty: 120 TABLET | Refills: 0 | Status: SHIPPED | OUTPATIENT
Start: 2021-08-16 | End: 2021-09-03 | Stop reason: SDUPTHER

## 2021-08-24 ENCOUNTER — OFFICE VISIT (OUTPATIENT)
Dept: ORTHOPEDIC SURGERY | Age: 66
End: 2021-08-24
Payer: MEDICARE

## 2021-08-24 VITALS
HEART RATE: 84 BPM | WEIGHT: 261 LBS | HEIGHT: 72 IN | BODY MASS INDEX: 35.35 KG/M2 | DIASTOLIC BLOOD PRESSURE: 76 MMHG | SYSTOLIC BLOOD PRESSURE: 132 MMHG

## 2021-08-24 DIAGNOSIS — S82.401K CLOSED FRACTURE OF SHAFT OF RIGHT FIBULA WITH NONUNION, UNSPECIFIED FRACTURE MORPHOLOGY, SUBSEQUENT ENCOUNTER: Primary | ICD-10-CM

## 2021-08-24 PROCEDURE — 99213 OFFICE O/P EST LOW 20 MIN: CPT | Performed by: PODIATRIST

## 2021-08-24 PROCEDURE — 99214 OFFICE O/P EST MOD 30 MIN: CPT | Performed by: PODIATRIST

## 2021-08-24 RX ORDER — CYCLOBENZAPRINE HCL 10 MG
TABLET ORAL EVERY 8 HOURS PRN
COMMUNITY
Start: 2021-04-23

## 2021-08-24 RX ORDER — DOXYCYCLINE HYCLATE 100 MG/1
CAPSULE ORAL 2 TIMES DAILY
COMMUNITY
Start: 2021-08-06 | End: 2021-09-03 | Stop reason: ALTCHOICE

## 2021-08-24 RX ORDER — PREDNISONE 10 MG/1
TABLET ORAL
COMMUNITY
Start: 2021-08-16 | End: 2021-09-03 | Stop reason: ALTCHOICE

## 2021-08-24 NOTE — PROGRESS NOTES
[address]         Progress and Procedure Note      Carmina Santiago  MEDICAL RECORD NUMBER:  [id number]  AGE: 60 y.o. GENDER: male  : 1955  EPISODE DATE:  2021    Subjective:     Chief Complaint   Patient presents with    Fracture     recheck right ankle fracture          HISTORY of PRESENT ILLNESS HPI    Patient is a pleasant 61-year-old male following up from surgical reconstruction of the right ankle consisting of a ORIF of a fibular nonunion with concomitant syndesmotic ligament reconstruction dated 2021. Patient overall is doing well relates minimal to no pain. Continues have some persistent swelling from time to time. Continues with use of his lace up ankle brace for an activity outside normal means. Patient was encouraged to continue with at home doctor at that stretching will follow up with us as needed. All questions concerns regarding medical management were otherwise addressed.       PAST MEDICAL HISTORY        Diagnosis Date    Alpha-1-antitrypsin deficiency ([address]     Backache, unspecified     Bladder cancer ([address]     Chronic pain of left knee     had supartz x 2 with no relief from [address] COPD (chronic obstructive pulmonary disease) ([address]     Depression     Eye problem 2019    bleeding and swelling behind Rt eye    Hyperlipidemia     Hypothyroidism     Other pulmonary embolism and infarction     Tobacco use disorder     WPW syndrome        PAST SURGICAL HISTORY    Past Surgical History:   Procedure Laterality Date    BLADDER SURGERY  october    CARDIAC CATHETERIZATION      FINGER SURGERY Right     index finger flexor tendon repair    FINGER SURGERY Left     KNEE ARTHROSCOPY Left     LUNG BIOPSY Left 2015    benign results    OTHER SURGICAL HISTORY  2014    L4/L5    SINUS SURGERY  2017    Dr. Carrizales Bank TISSUE BIOPSY  ,     THROAT SURGERY  5/3/16    Abdominal fat used to repair vocal cords    TONSILLECTOMY         FAMILY HISTORY    Family History   Problem Relation Age of Onset    Cancer Father         lung    Heart Disease Mother        SOCIAL HISTORY    Social History     Tobacco Use    Smoking status: Current Every Day Smoker     Packs/day: 0.50     Years: 46.00     Pack years: 23.00     Types: Cigarettes     Start date:      Last attempt to quit: 2020     Years since quittin.5    Smokeless tobacco: Never Used    Tobacco comment: Pt down to 1/2 ppd from 3 ppd, attempting to quit. Vaping Use    Vaping Use: Never used   Substance Use Topics    Alcohol use: No    Drug use: No       ALLERGIES    Allergies   Allergen Reactions    Penicillins Shortness Of Breath    Mucinex [Guaifenesin Er] Diarrhea and Nausea And Vomiting    Cephalexin Hives    Chantix [Varenicline Tartrate] Other (See Comments)     Worsening depression    Gabapentin Other (See Comments)     Severe depression    Adhesive Tape Rash       MEDICATIONS    Current Outpatient Medications on File Prior to Visit   Medication Sig Dispense Refill    predniSONE (DELTASONE) 10 MG tablet take 1 tablet by mouth once daily      doxycycline hyclate (VIBRAMYCIN) 100 MG capsule 2 times daily      cyclobenzaprine (FLEXERIL) 10 MG tablet every 8 hours as needed      HYDROcodone-acetaminophen (NORCO)  MG per tablet Take 1 tablet by mouth every 6 hours as needed for Pain for up to 30 days. 120 tablet 0    oxyCODONE (OXYCONTIN) 30 MG T12A extended release tablet Take 30 mg by mouth every 12 hours for 30 days.  60 each 0    levothyroxine (SYNTHROID) 125 MCG tablet Take 125 mcg by mouth daily      tiotropium (SPIRIVA) 18 MCG inhalation capsule Inhale 1 capsule into the lungs daily      cyanocobalamin 1000 MCG tablet Take 1,000 mcg by mouth daily      PROLASTIN-C 1000 MG/20ML SOLN IVPB Infuse intravenously once a week      ascorbic acid (VITAMIN C) 500 MG tablet Take 500 mg by mouth daily      ferrous sulfate (IRON 325) 325 (65 Fe) MG tablet Take 325 mg by mouth 3 times daily (with meals)      loratadine (CLARITIN) 10 MG tablet Take 10 mg by mouth daily      neomycin-polymyxin-hydrocortisone (CORTISPORIN) 3.5-10814-4 otic suspension Place 4 drops in ear(s) 3 times daily      pantoprazole (PROTONIX) 40 MG tablet Take 40 mg by mouth every morning (before breakfast)      rivaroxaban (XARELTO) 10 MG TABS tablet Take 10 mg by mouth daily      fluticasone (FLONASE) 50 MCG/ACT nasal spray 1 spray by Nasal route 2 times daily      ketorolac 0.4 % SOLN ophthalmic solution instill 1 drop into right eye three times a day      tamsulosin (FLOMAX) 0.4 MG capsule take 1 capsule by mouth at bedtime      traZODone (DESYREL) 50 MG tablet TAKE 1/2 TO 2  TABLETS BY MOUTH AT BEDTIME AS NEEDED FOR SLEEP      budesonide-formoterol (SYMBICORT) 160-4.5 MCG/ACT AERO Inhale 2 puffs into the lungs 2 times daily      benzonatate (TESSALON) 100 MG capsule as needed       NIFEdipine (PROCARDIA XL) 30 MG extended release tablet Take 30 mg by mouth daily      QUEtiapine (SEROQUEL XR) 300 MG extended release tablet Take 300 mg by mouth daily      TRINTELLIX 20 MG TABS tablet Take 20 mg by mouth daily      aspirin 81 MG chewable tablet Take 81 mg by mouth      pravastatin (PRAVACHOL) 40 MG tablet Take 40 mg by mouth daily.  albuterol (PROVENTIL HFA;VENTOLIN HFA) 108 (90 BASE) MCG/ACT inhaler Inhale 2 puffs into the lungs every 6 hours as needed for Wheezing        No current facility-administered medications on file prior to visit.        Review of Systems  General: (-) weight change, fatigue, weakness, fever, chills, night sweats  Head: (-) trauma, heacache location, frequency, nausea, vomiting, visual changes  Eyes: (-) glasses, contact lenses, blurriness, tearing, itching, acute visual changes  Ears: (-) hearing loss, tinnitus, vertigo, discharge, earache  Skin: (-) rash, subcutaneous lesion, open ulceration      Objective: /76 (Site: Left Upper Arm, Position: Sitting)   Pulse 84   Ht 6' (1.829 m)   Wt 261 lb (118.4 kg)   BMI 35.40 kg/m²     Wt Readings from Last 3 Encounters:   08/24/21 261 lb (118.4 kg)   07/13/21 261 lb (118.4 kg)   07/01/21 261 lb (118.4 kg)       PHYSICAL EXAMINATION  CONSTITUTIONAL:  Awake, alert, cooperative, no apparent distress, and appears stated age  Vascular: DP and PT pulses are palpable to the right lower extremity. Cap refill less than 5 seconds. Skin was warm and within normal limits. Mild amount of edema still noted within that right lower extremity. Dermatologic: Previous incision sites appear well coapted and healed. No further acute soft tissue manifestations of note. Neurovascular: Epicritic and protopathic sensations are grossly intact to the right lower extremity. Musculoskeletal: 5 of 5 muscle strength to all extrinsic muscle groups tested. Good bony anatomic alignment status status post surgical reconstruction right ankle. No further acute bony manifestations of note.   Imaging:  Narrative   EXAMINATION:   THREE XRAY VIEWS OF THE RIGHT ANKLE       7/13/2021 10:11 am       COMPARISON:   06/08/2021       HISTORY:   ORDERING SYSTEM PROVIDED HISTORY: Closed fracture of shaft of right fibula   with nonunion, unspecified fracture morphology, subsequent encounter   TECHNOLOGIST PROVIDED HISTORY:   Reason for Exam: F/u orif   Acuity: Acute   Type of Exam: Subsequent/Follow-up       FINDINGS:   Compression plate and screws are noted       in situ in the distal fibula.  Tunnel band fixator is present between the       distal fibula and tibia attached by a button on the medial aspect of the       distal tibia.  Hardware is intact without complication.  Persistent soft   tissue swelling is noted.  Talar dome and talar walls are intact.           Impression   Status post ORIF of the distal fibula.              CBC: No results found for: WBC, HGB, HCT, MCV, PLT  BMP: No results found for: NA, K, CL, CO2, PHOS, BUN, CREATININE  PT/INR:   Lab Results   Component Value Date    PROTIME 21.4 03/31/2015    INR 2.0 03/31/2015     Prealbumin: No results found for: PREALBUMIN  Albumin:No results found for: LABALBU  Sed Rate:No results found for: SEDRATE  CRP: No results found for: CRP  Micro: No results found for: BC   Hemoglobin A1C: No results found for: LABA1C    Assessment:      Diagnosis Orders   1.  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, unspecified fracture morphology, subsequent encounter           Patient Active Problem List   Diagnosis    Chronic left-sided low back pain with left-sided sciatica    Chronic cervical pain    Neuropathy    Chronic myofascial pain    Osteoarthritis of left knee    Opiate analgesic use agreement exists    Encounter for drug screening    Chronic pain of left knee    Encounter for long-term opiate analgesic use    Bladder [facility] ([facility]         Procedure Note  N/A    Plan:     Patient examined and evaluated today  Continue with at home doctor rested stretching  Continue with lace up ankle brace for activity outside normal means  Follow-up as needed    Reyna Hughes [facility]     Electronically signed by Rajeev Van DPM on 8/24/2021 at 1:31 PM

## 2021-09-03 ENCOUNTER — OFFICE VISIT (OUTPATIENT)
Dept: PAIN MANAGEMENT | Age: 66
End: 2021-09-03
Payer: MEDICARE

## 2021-09-03 VITALS
WEIGHT: 255 LBS | SYSTOLIC BLOOD PRESSURE: 124 MMHG | RESPIRATION RATE: 14 BRPM | HEIGHT: 72 IN | DIASTOLIC BLOOD PRESSURE: 86 MMHG | BODY MASS INDEX: 34.54 KG/M2

## 2021-09-03 DIAGNOSIS — M25.562 CHRONIC PAIN OF LEFT KNEE: ICD-10-CM

## 2021-09-03 DIAGNOSIS — G89.29 CHRONIC MYOFASCIAL PAIN: ICD-10-CM

## 2021-09-03 DIAGNOSIS — G89.29 CHRONIC LEFT-SIDED LOW BACK PAIN WITH LEFT-SIDED SCIATICA: Primary | ICD-10-CM

## 2021-09-03 DIAGNOSIS — G89.29 CHRONIC CERVICAL PAIN: ICD-10-CM

## 2021-09-03 DIAGNOSIS — G89.29 CHRONIC PAIN OF LEFT KNEE: ICD-10-CM

## 2021-09-03 DIAGNOSIS — M54.42 CHRONIC BILATERAL LOW BACK PAIN WITH LEFT-SIDED SCIATICA: ICD-10-CM

## 2021-09-03 DIAGNOSIS — M54.2 CHRONIC CERVICAL PAIN: ICD-10-CM

## 2021-09-03 DIAGNOSIS — G89.29 CHRONIC BILATERAL LOW BACK PAIN WITH LEFT-SIDED SCIATICA: ICD-10-CM

## 2021-09-03 DIAGNOSIS — M79.18 CHRONIC MYOFASCIAL PAIN: ICD-10-CM

## 2021-09-03 DIAGNOSIS — M54.42 CHRONIC LEFT-SIDED LOW BACK PAIN WITH LEFT-SIDED SCIATICA: Primary | ICD-10-CM

## 2021-09-03 DIAGNOSIS — M17.12 PRIMARY OSTEOARTHRITIS OF LEFT KNEE: ICD-10-CM

## 2021-09-03 PROCEDURE — 99213 OFFICE O/P EST LOW 20 MIN: CPT | Performed by: NURSE PRACTITIONER

## 2021-09-03 PROCEDURE — 99214 OFFICE O/P EST MOD 30 MIN: CPT | Performed by: NURSE PRACTITIONER

## 2021-09-03 RX ORDER — HYDROCODONE BITARTRATE AND ACETAMINOPHEN 10; 325 MG/1; MG/1
1 TABLET ORAL EVERY 6 HOURS PRN
Qty: 120 TABLET | Refills: 0 | Status: SHIPPED | OUTPATIENT
Start: 2021-09-15 | End: 2021-10-06 | Stop reason: SDUPTHER

## 2021-09-03 RX ORDER — TICAGRELOR 90 MG/1
90 TABLET ORAL 2 TIMES DAILY
COMMUNITY
Start: 2021-09-02

## 2021-09-03 RX ORDER — OXYCODONE HYDROCHLORIDE 30 MG/1
30 TABLET, FILM COATED, EXTENDED RELEASE ORAL EVERY 12 HOURS
Qty: 60 EACH | Refills: 0 | Status: SHIPPED | OUTPATIENT
Start: 2021-09-15 | End: 2021-11-04 | Stop reason: SDUPTHER

## 2021-09-03 ASSESSMENT — ENCOUNTER SYMPTOMS
CONSTIPATION: 0
BACK PAIN: 1
SHORTNESS OF BREATH: 0
EYES NEGATIVE: 1

## 2021-09-03 NOTE — PROGRESS NOTES
Subjective:      Patient ID: Yu Wilkins is a 73 y.o. male. Chief Complaint   Patient presents with    Peripheral Neuropathy    Arthritis    Medication Management     Knee Pain   Associated symptoms include numbness. Ankle Pain   Associated symptoms include numbness. Back Pain  Associated symptoms include numbness and weakness. Pertinent negatives include no chest pain. Other  Associated symptoms include arthralgias, fatigue, myalgias, neck pain, numbness and weakness. Pertinent negatives include no chest pain. Here today for routine pain clinic recheck, medication management    Pain reduced on current medications, denies side effects, maintaining ADL's. ADVERSE MEDICATION EFFECTS:   Nausea and vomiting: no   Constipation: no-Undercontrol-: no  Dizziness/drowsy/sleepy--not applicable  Urinary Retention: no    ACTIVITY/SOCIAL/EMOTIONAL:  Sleep Pattern: 6 hours per night. generally restful sleep  Home Exercises: daily walking  Activity:unchanged  Emotional Issues: normal.   Currently seeing a Psychiatrist or Psychologist:  No      ABERRANT BEHAVIORS SINCE LAST VISIT  Lost rx/pills:------------------------------------------ no  Taking  medication as prescribed: ----------- no  Urine Drug Screen ---------------------------------  yes  Recent ER visits: -------------------------------------No  Pill count is appropriate: ---------------------------not applicable   Refills for prescriptions appropriate:---------- yes      Pain Assessment  Location of Pain:  (lower back. ankle)  Severity of Pain: 7  Quality of Pain: Aching, Throbbing, Dull  Duration of Pain: Persistent  Frequency of Pain: Constant  Aggravating Factors: Bending, Stretching, Straightening, Exercise, Walking, Stairs, Kneeling, Squatting, Standing (weather)  Limiting Behavior: Yes  Relieving Factors: Rest, Heat    Allergies   Allergen Reactions    Penicillins Shortness Of Breath    Mucinex [Guaifenesin Er] Diarrhea and Nausea And Vomiting  Cephalexin Hives    Chantix [Varenicline Tartrate] Other (See Comments)     Worsening depression    Gabapentin Other (See Comments)     Severe depression    Adhesive Tape Rash       Outpatient Medications Marked as Taking for the 9/3/21 encounter (Office Visit) with SAHARA Stoddard CNP   Medication Sig Dispense Refill    BRILINTA 90 MG TABS tablet Take 90 mg by mouth 2 times daily      [START ON 9/15/2021] HYDROcodone-acetaminophen (NORCO)  MG per tablet Take 1 tablet by mouth every 6 hours as needed for Pain for up to 30 days. 120 tablet 0    [START ON 9/15/2021] oxyCODONE (OXYCONTIN) 30 MG T12A extended release tablet Take 30 mg by mouth every 12 hours for 30 days.  60 each 0    cyclobenzaprine (FLEXERIL) 10 MG tablet every 8 hours as needed      levothyroxine (SYNTHROID) 125 MCG tablet Take 125 mcg by mouth daily      tiotropium (SPIRIVA) 18 MCG inhalation capsule Inhale 1 capsule into the lungs daily      cyanocobalamin 1000 MCG tablet Take 1,000 mcg by mouth daily      PROLASTIN-C 1000 MG/20ML SOLN IVPB Infuse intravenously once a week      ascorbic acid (VITAMIN C) 500 MG tablet Take 500 mg by mouth daily      ferrous sulfate (IRON 325) 325 (65 Fe) MG tablet Take 325 mg by mouth 3 times daily (with meals)      loratadine (CLARITIN) 10 MG tablet Take 10 mg by mouth daily      neomycin-polymyxin-hydrocortisone (CORTISPORIN) 3.5-48661-4 otic suspension Place 4 drops in ear(s) 3 times daily      pantoprazole (PROTONIX) 40 MG tablet Take 40 mg by mouth every morning (before breakfast)      rivaroxaban (XARELTO) 10 MG TABS tablet Take 10 mg by mouth daily      fluticasone (FLONASE) 50 MCG/ACT nasal spray 1 spray by Nasal route 2 times daily      ketorolac 0.4 % SOLN ophthalmic solution instill 1 drop into right eye three times a day      tamsulosin (FLOMAX) 0.4 MG capsule take 1 capsule by mouth at bedtime      traZODone (DESYREL) 50 MG tablet TAKE 1/2 TO 2  TABLETS BY MOUTH AT BEDTIME AS NEEDED FOR SLEEP      budesonide-formoterol (SYMBICORT) 160-4.5 MCG/ACT AERO Inhale 2 puffs into the lungs 2 times daily      benzonatate (TESSALON) 100 MG capsule as needed       NIFEdipine (PROCARDIA XL) 30 MG extended release tablet Take 30 mg by mouth daily      QUEtiapine (SEROQUEL XR) 300 MG extended release tablet Take 300 mg by mouth daily      TRINTELLIX 20 MG TABS tablet Take 20 mg by mouth daily      aspirin 81 MG chewable tablet Take 81 mg by mouth      pravastatin (PRAVACHOL) 40 MG tablet Take 40 mg by mouth daily.       albuterol (PROVENTIL HFA;VENTOLIN HFA) 108 (90 BASE) MCG/ACT inhaler Inhale 2 puffs into the lungs every 6 hours as needed for Wheezing          Past Medical History:   Diagnosis Date    Alpha-1-antitrypsin deficiency ([address]     Backache, unspecified     Bladder cancer ([address]     Chronic pain of left knee     had supartz x 2 with no relief from [address] COPD (chronic obstructive pulmonary disease) ([address]     Depression     Eye problem 06/2019    bleeding and swelling behind Rt eye    Hyperlipidemia     Hypothyroidism     Other pulmonary embolism and infarction     Tobacco use disorder     WPW syndrome        Past Surgical History:   Procedure Laterality Date    BLADDER SURGERY  october    CARDIAC CATHETERIZATION  2013    CORONARY ANGIOPLASTY WITH STENT PLACEMENT  08/31/2021    [facility] stent    FINGER SURGERY Right 2006    index finger flexor tendon repair    FINGER SURGERY Left 2014    KNEE ARTHROSCOPY Left 2012    LUNG BIOPSY Left 2/4/2015    benign results    OTHER SURGICAL HISTORY  7/9/2014    L4/L5    SINUS SURGERY  11/04/2017    Dr. Asaf Mcfadden TISSUE BIOPSY  2014, 2010    THROAT SURGERY  5/3/16    Abdominal fat used to repair vocal cords    TONSILLECTOMY         Family History   Problem Relation Age of Onset    Cancer Father         lung    Heart Disease Mother        Social History     Socioeconomic History    Marital status:      Spouse name: None    Number of children: None    Years of education: None    Highest education level: None   Occupational History    None   Tobacco Use    Smoking status: Current Every Day Smoker     Packs/day: 0.50     Years: 46.00     Pack years: 23.00     Types: Cigarettes     Start date:      Last attempt to quit: 2020     Years since quittin.5    Smokeless tobacco: Former User     Quit date: 2021    Tobacco comment: Pt down to 1/2 ppd from 3 ppd, attempting to quit. Vaping Use    Vaping Use: Never used   Substance and Sexual Activity    Alcohol use: No    Drug use: No    Sexual activity: None   Other Topics Concern    None   Social History Narrative    None     Social Determinants of Health     Financial Resource Strain:     Difficulty of Paying Living Expenses:    Food Insecurity:     Worried About Running Out of Food in the Last Year:     [address] in the Last Year:    Transportation Needs:     Lack of Transportation (Medical):  Lack of Transportation (Non-Medical):    Physical Activity:     Days of Exercise per Week:     Minutes of Exercise per Session:    Stress:     Feeling of Stress :    Social Connections:     Frequency of Communication with Friends and Family:     Frequency of Social Gatherings with Friends and Family:     Attends Jew Services:     Active Member of Clubs or Organizations:     Attends Club or Organization Meetings:     Marital Status:    Intimate Partner Violence:     Fear of Current or Ex-Partner:     Emotionally Abused:     Physically Abused:     Sexually Abused:      Review of Systems   Constitutional: Positive for fatigue. HENT: Negative. Eyes: Negative. Respiratory: Negative for shortness of breath. Cardiovascular: Negative for chest pain. Recent cardiac stent placement   Gastrointestinal: Negative for constipation.    Musculoskeletal: Positive for arthralgias, back pain, myalgias and neck pain. Skin: Negative. Allergic/Immunologic: Negative for environmental allergies and food allergies. Neurological: Positive for weakness and numbness. Psychiatric/Behavioral: Positive for sleep disturbance. Objective:   Physical Exam  Constitutional:       General: He is not in acute distress. Appearance: He is well-developed. He is not ill-appearing, toxic-appearing or diaphoretic. HENT:      Head: Normocephalic and atraumatic. Cardiovascular:      Rate and Rhythm: Normal rate. Pulses: Normal pulses. Comments: Warm extremities. Normal capillary refill. Pulmonary:      Effort: Pulmonary effort is normal.   Skin:     General: Skin is warm and dry. Coloration: Skin is not pale. Neurological:      Mental Status: He is alert and oriented to person, place, and time. Cranial Nerves: No cranial nerve deficit. Sensory: No sensory deficit. Motor: No atrophy or abnormal muscle tone. Deep Tendon Reflexes: Reflexes are normal and symmetric. Psychiatric:         Mood and Affect: Affect is not angry. Speech: Speech normal.         Behavior: Behavior normal. Behavior is not agitated, aggressive or withdrawn. Behavior is cooperative. Judgment: Judgment normal. Judgment is not impulsive or inappropriate. Assessment:      1. Chronic left-sided low back pain with left-sided sciatica    2. Chronic bilateral low back pain with left-sided sciatica    3. Chronic cervical pain    4. Primary osteoarthritis of left knee    5. Chronic myofascial pain    6. Chronic pain of left knee          Plan:     Chronic pain diagnoses such as   1. Chronic left-sided low back pain with left-sided sciatica    2. Chronic bilateral low back pain with left-sided sciatica    3. Chronic cervical pain    4. Primary osteoarthritis of left knee    5. Chronic myofascial pain    6.  Chronic pain of left knee     controlled on current medication regime, wll continue current pain medications to improve quality of life and function. SOAPP- the score is 4 (Values indicates patient is <4minimal potential 4-7 Moderate potential >7 High potential for drug addiction)    Controlled Substances Monitoring:     RX Monitoring 9/3/2021   Attestation -   Periodic Controlled Substance Monitoring No signs of potential drug abuse or diversion identified.;Obtaining appropriate analgesic effect of treatment.    Chronic Pain > 50 MEDD -   Chronic Pain > 80 MEDD -   Chronic Pain > 120 MEDD (historical values) -   Chronic Pain > 120 MEDD -           Follow up two months

## 2021-09-15 ENCOUNTER — TELEPHONE (OUTPATIENT)
Dept: UROLOGY | Age: 66
End: 2021-09-15

## 2021-09-15 NOTE — TELEPHONE ENCOUNTER
9/15/2021 Patient called requesting an appointment with our urologists. Patient states he has a hx of bladder cancer and had surgery with Dr Jeronimo Ryder approx 1 year at [facility]. He states he has ot been back to see Dr Jeronimo Ryder since the surgery.  He wants to see another urologist.  Please call him at [phone]

## 2021-09-30 ENCOUNTER — TELEPHONE (OUTPATIENT)
Dept: PAIN MANAGEMENT | Age: 66
End: 2021-09-30

## 2021-09-30 NOTE — TELEPHONE ENCOUNTER
Insurance company called to do a few questions over the phone, these were addressed and he stated that the answers would be submitted to the clinical reviewer.   They should have a response within 24 hours via fax

## 2021-09-30 NOTE — TELEPHONE ENCOUNTER
Patient called and stated that his oxycontin ER 30mg would need to have a PA done and we needed to call [phone]; [place] via mail today [place] [facility] [place] required and that we needed to do this on cover my meds.     This was started on cover my meds per letter request.  Will await insurance response

## 2021-09-30 NOTE — TELEPHONE ENCOUNTER
Received  A fax from [facility] [employer] that wants the office to answer the following questions:    1. Since this is a non formulary - has morphine ER or xtampza ER sprinkles been tried and failed or contraindicated? 2.  Provide patient specific clinical rationale why each formulary alternative listed above not work, please address each formulary alternative separately in your answer  3. If the drug with the same active ingredient is deemed ineffective/cause side effects, please give additional details as to why the requested product Oxycodone HCL 30mg tablet would not also be ineffective or have the same side effect. Please fax information to [phone] or call [phone]       How would you like to proceed?

## 2021-10-01 DIAGNOSIS — G89.29 CHRONIC BILATERAL LOW BACK PAIN WITH LEFT-SIDED SCIATICA: Primary | ICD-10-CM

## 2021-10-01 DIAGNOSIS — M54.42 CHRONIC BILATERAL LOW BACK PAIN WITH LEFT-SIDED SCIATICA: Primary | ICD-10-CM

## 2021-10-01 RX ORDER — OXYCODONE 27 MG/1
27 CAPSULE, EXTENDED RELEASE ORAL EVERY 12 HOURS
Qty: 60 EACH | Refills: 0 | Status: SHIPPED | OUTPATIENT
Start: 2021-10-01 | End: 2021-10-31

## 2021-10-06 DIAGNOSIS — G89.29 CHRONIC BILATERAL LOW BACK PAIN WITH LEFT-SIDED SCIATICA: ICD-10-CM

## 2021-10-06 DIAGNOSIS — M17.12 PRIMARY OSTEOARTHRITIS OF LEFT KNEE: ICD-10-CM

## 2021-10-06 DIAGNOSIS — G89.29 CHRONIC CERVICAL PAIN: ICD-10-CM

## 2021-10-06 DIAGNOSIS — M54.42 CHRONIC BILATERAL LOW BACK PAIN WITH LEFT-SIDED SCIATICA: ICD-10-CM

## 2021-10-06 DIAGNOSIS — M54.2 CHRONIC CERVICAL PAIN: ICD-10-CM

## 2021-10-06 NOTE — TELEPHONE ENCOUNTER
Patient called refill line for his [place] to be [facility] to [facility] pharmacy. Last Appt:  9/3/2021  Next Appt:   11/4/2021  Med verified in [address] [place] [address] [place] for [place], and [place]: 9/15/21 hydrocodone acetaminophen 10-325mg #120. Due 10/15/21.

## 2021-10-07 NOTE — TELEPHONE ENCOUNTER
Pt was notified and had picked up this new medication; has had it for one da; he is going to switch back insurances for November to the previous insurance but will discuss this with you in his appointment in November

## 2021-10-08 RX ORDER — HYDROCODONE BITARTRATE AND ACETAMINOPHEN 10; 325 MG/1; MG/1
1 TABLET ORAL EVERY 6 HOURS PRN
Qty: 120 TABLET | Refills: 0 | Status: SHIPPED | OUTPATIENT
Start: 2021-10-15 | End: 2021-11-04 | Stop reason: SDUPTHER

## 2021-10-18 ENCOUNTER — OFFICE VISIT (OUTPATIENT)
Dept: UROLOGY | Age: 66
End: 2021-10-18
Payer: MEDICARE

## 2021-10-18 VITALS
SYSTOLIC BLOOD PRESSURE: 132 MMHG | OXYGEN SATURATION: 97 % | HEIGHT: 72 IN | WEIGHT: 255 LBS | BODY MASS INDEX: 34.54 KG/M2 | DIASTOLIC BLOOD PRESSURE: 68 MMHG | RESPIRATION RATE: 16 BRPM | HEART RATE: 78 BPM

## 2021-10-18 DIAGNOSIS — N40.1 BENIGN PROSTATIC HYPERPLASIA WITH WEAK URINARY STREAM: ICD-10-CM

## 2021-10-18 DIAGNOSIS — C67.9 MALIGNANT NEOPLASM OF URINARY BLADDER, UNSPECIFIED SITE (HCC): Primary | ICD-10-CM

## 2021-10-18 DIAGNOSIS — R39.12 BENIGN PROSTATIC HYPERPLASIA WITH WEAK URINARY STREAM: ICD-10-CM

## 2021-10-18 PROCEDURE — 3017F COLORECTAL CA SCREEN DOC REV: CPT | Performed by: UROLOGY

## 2021-10-18 PROCEDURE — 1123F ACP DISCUSS/DSCN MKR DOCD: CPT | Performed by: UROLOGY

## 2021-10-18 PROCEDURE — 99214 OFFICE O/P EST MOD 30 MIN: CPT | Performed by: UROLOGY

## 2021-10-18 PROCEDURE — 4040F PNEUMOC VAC/ADMIN/RCVD: CPT | Performed by: UROLOGY

## 2021-10-18 PROCEDURE — G8427 DOCREV CUR MEDS BY ELIG CLIN: HCPCS | Performed by: UROLOGY

## 2021-10-18 PROCEDURE — G8417 CALC BMI ABV UP PARAM F/U: HCPCS | Performed by: UROLOGY

## 2021-10-18 PROCEDURE — 4004F PT TOBACCO SCREEN RCVD TLK: CPT | Performed by: UROLOGY

## 2021-10-18 PROCEDURE — 99204 OFFICE O/P NEW MOD 45 MIN: CPT | Performed by: UROLOGY

## 2021-10-18 PROCEDURE — G8484 FLU IMMUNIZE NO ADMIN: HCPCS | Performed by: UROLOGY

## 2021-10-18 NOTE — PROGRESS NOTES
LENNIE Rodriguez MD        [address]  [address] [address]  Dept: [phone]  Dept Fax: [phone]  Loc: [facility] [facility] Urology Office Note - New Patient    Patient:  Dwaine Wilkinson  YOB: 1955  Date: 10/18/2021    The patient is a 73 y.o. male who presents today for evaluation of the following problems:   Chief Complaint   Patient presents with    New Patient     previous pt of Dr. Rosa Cortez at [address], did not want to return    Bladder Cancer     tumors removed Oct 2020, had large amount of blood loss (on Coumadin), reports that Dr. Rosa Cortez was notified but did not do anything, caused him to pass out and shatter his ankle, most recently had UA that showed trace blood    Benign Prostatic Hypertrophy     weakening flow    referred/consultation requested by Rosangela Aviles MD.    HISTORY OF PRESENT ILLNESS:     Bladder Cancer  Low grade noninvasive-- has used mitomycin  Previous Dr Rosa Cortez patient  Had postoperative bleeding issues-- on blood thinners  Last scope late 2020    BPH  Worsening flow  On flomax      Summary of Previous Records:  I had the opportunity to see Ayden Alonzo today back in follow-up. We removed a number of small areas on the dome of the bladder. He did have Mitomycin placed. He restarted his Coumadin and unfortunately developed significant gross hematuria. He was seen in emergency room and the Coumadin was stopped and he was restarted on injections. We are going to have him continue with that for 1 week. Hopefully we can get through this. Allow that mucosa heal. There is nothing further that we would need to do from a cancer standpoint. The final pathology report continued to show low-grade tumor with no evidence of any invasion of the lamina propria.       Requested/reviewed records from Rosangela Aviles MD office and/or outside physician/EMR    (Patient's old records have been requested, reviewed and pertinent findings summarized in today's note.)    Procedures Today: N/A      Last several PSA's:  No results found for: PSA    Last total testosterone:  No results found for: TESTOSTERONE    Urinalysis today:  No results found for this visit on 10/18/21. 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 Tunnel band fixator is present between the distal fibula and tibia attached by a button on the medial aspect of the distal tibia. Hardware is intact without complication. Persistent soft tissue swelling is noted. Talar dome and talar walls are intact. Status post ORIF of the distal fibula.        PAST MEDICAL, FAMILY AND SOCIAL HISTORY:  Past Medical History:   Diagnosis Date    Alpha-1-antitrypsin deficiency ([address]     Backache, unspecified     Bladder cancer ([address]     Chronic pain of left knee     had supartz x 2 with no relief from [address] COPD (chronic obstructive pulmonary disease) ([address]     Depression     Eye problem 06/2019    bleeding and swelling behind Rt eye    Hyperlipidemia     Hypothyroidism     Other pulmonary embolism and infarction     Tobacco use disorder     WPW syndrome      Past Surgical History:   Procedure Laterality Date    BLADDER SURGERY  october    CARDIAC CATHETERIZATION  2013    CORONARY ANGIOPLASTY WITH STENT PLACEMENT  08/31/2021    [facility] stent    FINGER SURGERY Right 2006    index finger flexor tendon repair    FINGER SURGERY Left 2014    KNEE ARTHROSCOPY Left 2012    LUNG BIOPSY Left 2/4/2015    benign results    OTHER SURGICAL HISTORY  7/9/2014    L4/L5    SINUS SURGERY  11/04/2017    Dr. Hui Cornejo TISSUE BIOPSY  2014, 2010    THROAT SURGERY  5/3/16    Abdominal fat used to repair vocal cords    TONSILLECTOMY       Family History   Problem Relation Age of Onset    Cancer Father         lung    Heart Disease Mother      Outpatient Medications Marked as Taking for the 10/18/21 encounter (Office Visit) with Wai Pelaez MD   Medication Sig Dispense Refill    HYDROcodone-acetaminophen (NORCO)  MG per tablet Take 1 tablet by mouth every 6 hours as needed for Pain for up to 30 days. 120 tablet 0    oxyCODONE ER (XTAMPZA ER) 27 MG C12A Take 27 mg by mouth every 12 hours for 30 days.  60 each 0    BRILINTA 90 MG TABS tablet Take 90 mg by mouth 2 times daily      cyclobenzaprine (FLEXERIL) 10 MG tablet every 8 hours as needed      levothyroxine (SYNTHROID) 125 MCG tablet Take 125 mcg by mouth daily      tiotropium (SPIRIVA) 18 MCG inhalation capsule Inhale 1 capsule into the lungs daily      cyanocobalamin 1000 MCG tablet Take 1,000 mcg by mouth daily      PROLASTIN-C 1000 MG/20ML SOLN IVPB Infuse intravenously once a week      ascorbic acid (VITAMIN C) 500 MG tablet Take 500 mg by mouth daily      ferrous sulfate (IRON 325) 325 (65 Fe) MG tablet Take 325 mg by mouth 3 times daily (with meals)      loratadine (CLARITIN) 10 MG tablet Take 10 mg by mouth daily      neomycin-polymyxin-hydrocortisone (CORTISPORIN) 3.5-09977-4 otic suspension Place 4 drops in ear(s) 3 times daily      pantoprazole (PROTONIX) 40 MG tablet Take 40 mg by mouth every morning (before breakfast)      rivaroxaban (XARELTO) 10 MG TABS tablet Take 10 mg by mouth daily      fluticasone (FLONASE) 50 MCG/ACT nasal spray 1 spray by Nasal route 2 times daily      ketorolac 0.4 % SOLN ophthalmic solution instill 1 drop into right eye three times a day      tamsulosin (FLOMAX) 0.4 MG capsule take 1 capsule by mouth at bedtime      traZODone (DESYREL) 50 MG tablet TAKE 1/2 TO 2  TABLETS BY MOUTH AT BEDTIME AS NEEDED FOR SLEEP      budesonide-formoterol (SYMBICORT) 160-4.5 MCG/ACT AERO Inhale 2 puffs into the lungs 2 times daily      benzonatate (TESSALON) 100 MG capsule as needed       NIFEdipine (PROCARDIA XL) 30 MG extended release tablet Take 30 mg by mouth daily      QUEtiapine (SEROQUEL XR) 300 MG extended release tablet Take 300 mg by mouth daily      TRINTELLIX 20 MG TABS tablet Take 20 mg by mouth daily      pravastatin (PRAVACHOL) 40 MG tablet Take 40 mg by mouth daily.  albuterol (PROVENTIL HFA;VENTOLIN HFA) 108 (90 BASE) MCG/ACT inhaler Inhale 2 puffs into the lungs every 6 hours as needed for Wheezing          Penicillins, Mucinex [guaifenesin er], Cephalexin, Chantix [varenicline tartrate], Gabapentin, and Adhesive tape  Social History     Tobacco Use   Smoking Status Current Every Day Smoker    Packs/day: 0.50    Years: 46.00    Pack years: 23.00    Types: Cigarettes    Start date: 1/1/1971   Smokeless Tobacco Former User    Quit date: 9/1/2021   Tobacco Comment    Pt down to 1/2 ppd from 3 ppd, attempting to quit.       (If patient a smoker, smoking cessation counseling offered)   Social History     Substance and Sexual Activity   Alcohol Use No       REVIEW OF SYSTEMS:  Constitutional: negative  Eyes: negative  Respiratory: negative  Cardiovascular: negative  Gastrointestinal: negative  Genitourinary: see HPI  Musculoskeletal: negative  Skin: negative   Neurological: negative  Hematological/Lymphatic: negative  Psychological: negative      Physical Exam:    This a 73 y.o. male  Vitals:    10/18/21 0817   BP: 132/68   Pulse: 78   Resp: 16   SpO2: 97%     Body mass index is 34.58 kg/m². Constitutional: Patient in no acute distress;       Assessment and Plan        1. Malignant neoplasm of urinary bladder, unspecified site ([facility]    2. Benign prostatic hyperplasia with weak urinary stream               Plan:      BPH- worsening weak stream. Already on flomax. No longer helping  Bladder cancer- needs cystoscopy local in office. Will eval prostate at this time      Prescriptions Ordered:  No orders of the defined types were placed in this encounter. Orders Placed:  No orders of the defined types were placed in this encounter.            Gema Kurtz MD

## 2021-11-01 ENCOUNTER — PROCEDURE VISIT (OUTPATIENT)
Dept: UROLOGY | Age: 66
End: 2021-11-01
Payer: MEDICARE

## 2021-11-01 VITALS — HEART RATE: 79 BPM | SYSTOLIC BLOOD PRESSURE: 106 MMHG | DIASTOLIC BLOOD PRESSURE: 84 MMHG

## 2021-11-01 DIAGNOSIS — C67.9 MALIGNANT NEOPLASM OF URINARY BLADDER, UNSPECIFIED SITE (HCC): Primary | ICD-10-CM

## 2021-11-01 DIAGNOSIS — R39.12 BENIGN PROSTATIC HYPERPLASIA WITH WEAK URINARY STREAM: ICD-10-CM

## 2021-11-01 DIAGNOSIS — N40.1 BENIGN PROSTATIC HYPERPLASIA WITH WEAK URINARY STREAM: ICD-10-CM

## 2021-11-01 PROCEDURE — 52000 CYSTOURETHROSCOPY: CPT | Performed by: UROLOGY

## 2021-11-04 ENCOUNTER — TELEPHONE (OUTPATIENT)
Dept: PAIN MANAGEMENT | Age: 66
End: 2021-11-04

## 2021-11-04 ENCOUNTER — HOSPITAL ENCOUNTER (OUTPATIENT)
Dept: GENERAL RADIOLOGY | Age: 66
Discharge: HOME | End: 2021-11-06
Payer: MEDICARE

## 2021-11-04 ENCOUNTER — OFFICE VISIT (OUTPATIENT)
Dept: PAIN MANAGEMENT | Age: 66
End: 2021-11-04
Payer: MEDICARE

## 2021-11-04 VITALS
HEIGHT: 72 IN | BODY MASS INDEX: 35.08 KG/M2 | SYSTOLIC BLOOD PRESSURE: 98 MMHG | WEIGHT: 259 LBS | DIASTOLIC BLOOD PRESSURE: 64 MMHG

## 2021-11-04 DIAGNOSIS — M54.2 CHRONIC CERVICAL PAIN: ICD-10-CM

## 2021-11-04 DIAGNOSIS — M54.42 CHRONIC BILATERAL LOW BACK PAIN WITH LEFT-SIDED SCIATICA: ICD-10-CM

## 2021-11-04 DIAGNOSIS — M17.12 PRIMARY OSTEOARTHRITIS OF ONE KNEE, LEFT: ICD-10-CM

## 2021-11-04 DIAGNOSIS — M17.12 PRIMARY OSTEOARTHRITIS OF LEFT KNEE: ICD-10-CM

## 2021-11-04 DIAGNOSIS — G89.29 CHRONIC BILATERAL LOW BACK PAIN WITH LEFT-SIDED SCIATICA: ICD-10-CM

## 2021-11-04 DIAGNOSIS — G89.29 CHRONIC CERVICAL PAIN: ICD-10-CM

## 2021-11-04 DIAGNOSIS — M17.12 PRIMARY OSTEOARTHRITIS OF ONE KNEE, LEFT: Primary | ICD-10-CM

## 2021-11-04 PROCEDURE — 99214 OFFICE O/P EST MOD 30 MIN: CPT | Performed by: NURSE PRACTITIONER

## 2021-11-04 PROCEDURE — 73562 X-RAY EXAM OF KNEE 3: CPT

## 2021-11-04 RX ORDER — OXYCODONE HYDROCHLORIDE 30 MG/1
30 TABLET, FILM COATED, EXTENDED RELEASE ORAL EVERY 12 HOURS
Qty: 60 EACH | Refills: 0 | Status: SHIPPED | OUTPATIENT
Start: 2021-11-04 | End: 2021-12-01 | Stop reason: SDUPTHER

## 2021-11-04 RX ORDER — HYDROCODONE BITARTRATE AND ACETAMINOPHEN 10; 325 MG/1; MG/1
1 TABLET ORAL EVERY 6 HOURS PRN
Qty: 120 TABLET | Refills: 0 | Status: SHIPPED | OUTPATIENT
Start: 2021-11-14 | End: 2021-11-08 | Stop reason: SDUPTHER

## 2021-11-04 ASSESSMENT — ENCOUNTER SYMPTOMS
SHORTNESS OF BREATH: 0
BACK PAIN: 1
CONSTIPATION: 0
EYES NEGATIVE: 1

## 2021-11-04 NOTE — PROGRESS NOTES
Subjective:      Patient ID: Charleen Alicear is a 73 y.o. male. Chief Complaint   Patient presents with    Follow-up     2 month f/u, left sided sciatica      Knee Pain   Associated symptoms include numbness. Ankle Pain   Associated symptoms include numbness. Back Pain  Associated symptoms include numbness and weakness. Pertinent negatives include no chest pain. Other  Associated symptoms include arthralgias, fatigue, myalgias, neck pain, numbness and weakness. Pertinent negatives include no chest pain. Here today for routine pain clinic recheck, medication management    Would like to go back to oxycontin. Janeth Brisker wears off too quickly    ADVERSE MEDICATION EFFECTS:   Nausea and vomiting: no   Constipation: no-Undercontrol-: no  Dizziness/drowsy/sleepy--not applicable  Urinary Retention: no    ACTIVITY/SOCIAL/EMOTIONAL:  Sleep Pattern: 6 hours per night.  generally restful sleep  Home Exercises: daily walking  Activity:unchanged  Emotional Issues: normal.   Currently seeing a Psychiatrist or Psychologist:  No      ABERRANT BEHAVIORS SINCE LAST VISIT  Lost rx/pills:------------------------------------------ no  Taking  medication as prescribed: ----------- no  Urine Drug Screen ---------------------------------  yes  Recent ER visits: -------------------------------------No  Pill count is appropriate: ---------------------------not applicable   Refills for prescriptions appropriate:---------- yes      Pain Assessment  Location of Pain: Back (radiates down left leg.)  Location Modifiers: Left, Posterior, Inferior  Severity of Pain: 6  Quality of Pain: Throbbing, Sharp, Dull, Aching, Locking, Grinding, Popping, Cracking, Buckling  Duration of Pain: Persistent  Frequency of Pain: Constant  Aggravating Factors: Walking, Standing, Stairs  Limiting Behavior: Yes  Relieving Factors: Rest, Other (Comment) (message, tens unit. )    Allergies   Allergen Reactions    Penicillins Shortness Of Breath    Mucinex [Guaifenesin Er] Diarrhea and Nausea And Vomiting    Cephalexin Hives    Chantix [Varenicline Tartrate] Other (See Comments)     Worsening depression    Gabapentin Other (See Comments)     Severe depression    Adhesive Tape Rash       Outpatient Medications Marked as Taking for the 11/4/21 encounter (Office Visit) with SAHARA [place] CNP   Medication Sig Dispense Refill    oxyCODONE (OXYCONTIN) 30 MG T12A extended release tablet Take 30 mg by mouth every 12 hours for 30 days. 60 each 0    [START ON 11/14/2021] HYDROcodone-acetaminophen (NORCO)  MG per tablet Take 1 tablet by mouth every 6 hours as needed for Pain for up to 30 days.  120 tablet 0    BRILINTA 90 MG TABS tablet Take 90 mg by mouth 2 times daily      cyclobenzaprine (FLEXERIL) 10 MG tablet every 8 hours as needed      levothyroxine (SYNTHROID) 125 MCG tablet Take 125 mcg by mouth daily      tiotropium (SPIRIVA) 18 MCG inhalation capsule Inhale 1 capsule into the lungs daily      cyanocobalamin 1000 MCG tablet Take 1,000 mcg by mouth daily      PROLASTIN-C 1000 MG/20ML SOLN IVPB Infuse intravenously once a week      ascorbic acid (VITAMIN C) 500 MG tablet Take 500 mg by mouth daily      ferrous sulfate (IRON 325) 325 (65 Fe) MG tablet Take 325 mg by mouth 3 times daily (with meals)      loratadine (CLARITIN) 10 MG tablet Take 10 mg by mouth daily      pantoprazole (PROTONIX) 40 MG tablet Take 40 mg by mouth every morning (before breakfast)      rivaroxaban (XARELTO) 10 MG TABS tablet Take 10 mg by mouth daily      fluticasone (FLONASE) 50 MCG/ACT nasal spray 1 spray by Nasal route 2 times daily      ketorolac 0.4 % SOLN ophthalmic solution instill 1 drop into right eye three times a day      tamsulosin (FLOMAX) 0.4 MG capsule take 1 capsule by mouth at bedtime      traZODone (DESYREL) 50 MG tablet TAKE 1/2 TO 2  TABLETS BY MOUTH AT BEDTIME AS NEEDED FOR SLEEP      budesonide-formoterol (SYMBICORT) 160-4.5 MCG/ACT AERO Inhale 2 puffs into the lungs 2 times daily      benzonatate (TESSALON) 100 MG capsule as needed       QUEtiapine (SEROQUEL XR) 300 MG extended release tablet Take 300 mg by mouth daily      TRINTELLIX 20 MG TABS tablet Take 20 mg by mouth daily      pravastatin (PRAVACHOL) 40 MG tablet Take 40 mg by mouth daily.       albuterol (PROVENTIL HFA;VENTOLIN HFA) 108 (90 BASE) MCG/ACT inhaler Inhale 2 puffs into the lungs every 6 hours as needed for Wheezing          Past Medical History:   Diagnosis Date    [facility]-antitry[facility] deficiency [facility]     Backache, unspecified     Bladder cancer ([facility] [address]     Chronic pain of left knee     had supartz x 2 with no relief from [address] COPD (chronic obstructive pulmonary disease) ([facility] [address]     Depression     Eye problem 06/2019    bleeding and swelling behind Rt eye    Hyperlipidemia     Hypothyroidism     Other pulmonary embolism and infarction     Tobacco use disorder     WPW syndrome        Past Surgical History:   Procedure Laterality Date    BLADDER SURGERY  october    CARDIAC CATHETERIZATION  2013    CORONARY ANGIOPLASTY WITH STENT PLACEMENT  08/31/2021    [facility] 1 stent    FINGER SURGERY Right 2006    index finger flexor tendon repair    FINGER SURGERY Left 2014    KNEE ARTHROSCOPY Left 2012    LUNG BIOPSY Left 2/4/2015    benign results    OTHER SURGICAL HISTORY  7/9/2014    L4/L5    SINUS SURGERY  11/04/2017    Dr. Ayse June TISSUE BIOPSY  2014, 2010    THROAT SURGERY  5/3/16    Abdominal fat used to repair vocal cords    TONSILLECTOMY         Family History   Problem Relation Age of Onset    Cancer Father         lung    Heart Disease Mother        Social History     Socioeconomic History    Marital status:      Spouse name: None    Number of children: None    Years of education: None    Highest education level: None   Occupational History    None   Tobacco Use    Smoking status: Current Every Day Smoker Packs/day: 0.50     Years: 46.00     Pack years: 23.00     Types: Cigarettes     Start date: 1/1/1971    Smokeless tobacco: Former User     Quit date: 9/1/2021    Tobacco comment: Pt down to 1/2 ppd from 3 ppd, attempting to quit. Vaping Use    Vaping Use: Never used   Substance and Sexual Activity    Alcohol use: No    Drug use: No    Sexual activity: None   Other Topics Concern    None   Social History Narrative    None     Social Determinants of Health     Financial Resource Strain:     Difficulty of Paying Living Expenses:    Food Insecurity:     Worried About Running Out of Food in the Last Year:     [address] in the Last Year:    Transportation Needs:     Lack of Transportation (Medical):  Lack of Transportation (Non-Medical):    Physical Activity:     Days of Exercise per Week:     Minutes of Exercise per Session:    Stress:     Feeling of Stress :    Social Connections:     Frequency of Communication with Friends and Family:     Frequency of Social Gatherings with Friends and Family:     Attends Sikhism Services:     Active Member of Clubs or Organizations:     Attends Club or Organization Meetings:     Marital Status:    Intimate Partner Violence:     Fear of Current or Ex-Partner:     Emotionally Abused:     Physically Abused:     Sexually Abused:      Review of Systems   Constitutional: Positive for fatigue. HENT: Negative. Eyes: Negative. Respiratory: Negative for shortness of breath. Cardiovascular: Negative for chest pain. Recent cardiac stent placement   Gastrointestinal: Negative for constipation. Musculoskeletal: Positive for arthralgias, back pain, myalgias and neck pain. Skin: Negative. Allergic/Immunologic: Negative for environmental allergies and food allergies. Neurological: Positive for weakness and numbness. Psychiatric/Behavioral: Positive for sleep disturbance.        Objective:   Physical Exam  Constitutional: General: He is not in acute distress. Appearance: He is well-developed. He is not ill-appearing, toxic-appearing or diaphoretic. HENT:      Head: Normocephalic and atraumatic. Cardiovascular:      Rate and Rhythm: Normal rate. Pulses: Normal pulses. Comments: Warm extremities. Normal capillary refill. Pulmonary:      Effort: Pulmonary effort is normal.   Skin:     General: Skin is warm and dry. Coloration: Skin is not pale. Neurological:      Mental Status: He is alert and oriented to person, place, and time. Cranial Nerves: No cranial nerve deficit. Sensory: No sensory deficit. Motor: No atrophy or abnormal muscle tone. Deep Tendon Reflexes: Reflexes are normal and symmetric. Psychiatric:         Mood and Affect: Affect is not angry. Speech: Speech normal.         Behavior: Behavior normal. Behavior is not agitated, aggressive or withdrawn. Behavior is cooperative. Judgment: Judgment normal. Judgment is not impulsive or inappropriate. Assessment:      1. Chronic bilateral low back pain with left-sided sciatica    2. Chronic cervical pain    3. Primary osteoarthritis of left knee          Plan: Will send in Rx for oxycontin at previous dose  Left knee xray, will plan euflexxa series    SOAPP- the score is 4 (Values indicates patient is <4minimal potential 4-7 Moderate potential >7 High potential for drug addiction)    Controlled Substances Monitoring:     RX Monitoring 9/3/2021   Attestation -   Periodic Controlled Substance Monitoring No signs of potential drug abuse or diversion identified.;Obtaining appropriate analgesic effect of treatment.    Chronic Pain > 50 MEDD -   Chronic Pain > 80 MEDD -   Chronic Pain > 120 MEDD (historical values) -   Chronic Pain > 120 MEDD -           Follow up two months

## 2021-11-04 NOTE — TELEPHONE ENCOUNTER
He will need xray on left knee to complete PA for euflexxa.  I will put order in, please notify patient

## 2021-11-08 DIAGNOSIS — M54.42 CHRONIC BILATERAL LOW BACK PAIN WITH LEFT-SIDED SCIATICA: ICD-10-CM

## 2021-11-08 DIAGNOSIS — G89.29 CHRONIC BILATERAL LOW BACK PAIN WITH LEFT-SIDED SCIATICA: ICD-10-CM

## 2021-11-08 DIAGNOSIS — M17.12 PRIMARY OSTEOARTHRITIS OF LEFT KNEE: ICD-10-CM

## 2021-11-08 DIAGNOSIS — G89.29 CHRONIC CERVICAL PAIN: ICD-10-CM

## 2021-11-08 DIAGNOSIS — M54.2 CHRONIC CERVICAL PAIN: ICD-10-CM

## 2021-11-08 NOTE — TELEPHONE ENCOUNTER
Patient called stating medication was sent to pharmacy to be refilled on 11/14/21. However the [facility] pharmacy is closed on sat and sun. Would you please consider allow medication to be refilled on Friday the 11/12/21? Thank you. Last Appt:  11/4/2021  Next Appt:   1/4/2022  Med [facility] [place] for [place]: norco 10/325 mg 10/15/21   #120. Due 11/15/21.

## 2021-11-12 ENCOUNTER — TELEPHONE (OUTPATIENT)
Dept: PAIN MANAGEMENT | Age: 66
End: 2021-11-12

## 2021-11-12 RX ORDER — HYDROCODONE BITARTRATE AND ACETAMINOPHEN 10; 325 MG/1; MG/1
1 TABLET ORAL EVERY 6 HOURS PRN
Qty: 120 TABLET | Refills: 0 | Status: SHIPPED | OUTPATIENT
Start: 2021-11-12 | End: 2021-12-01 | Stop reason: SDUPTHER

## 2021-11-16 NOTE — TELEPHONE ENCOUNTER
Writer called and spoke with Jeison Torres who stated that they will not approve the patient's Euflexxa due to it not being the preferred injection. Insurance stated that they will approve orthovisc as it being the preferred medication. What would you like to do?

## 2021-11-18 NOTE — TELEPHONE ENCOUNTER
Called insurance and spoke to pre authorization dept who approved patient for Synvisc one. Patient notified of approval and scheduled for injection on 11/22/21 for left knee Synvisc one injection.

## 2021-11-18 NOTE — TELEPHONE ENCOUNTER
Received form via fax from [facility] for preferred product form. Form filled out for preferred product Synvisc and re faxed to insurance.

## 2021-11-22 ENCOUNTER — PROCEDURE VISIT (OUTPATIENT)
Dept: PAIN MANAGEMENT | Age: 66
End: 2021-11-22
Payer: MEDICARE

## 2021-11-22 VITALS
OXYGEN SATURATION: 94 % | WEIGHT: 258.6 LBS | BODY MASS INDEX: 35.03 KG/M2 | DIASTOLIC BLOOD PRESSURE: 74 MMHG | HEART RATE: 74 BPM | HEIGHT: 72 IN | SYSTOLIC BLOOD PRESSURE: 130 MMHG

## 2021-11-22 DIAGNOSIS — M25.562 CHRONIC PAIN OF LEFT KNEE: Primary | ICD-10-CM

## 2021-11-22 DIAGNOSIS — M17.12 PRIMARY OSTEOARTHRITIS OF LEFT KNEE: ICD-10-CM

## 2021-11-22 DIAGNOSIS — G89.29 CHRONIC PAIN OF LEFT KNEE: Primary | ICD-10-CM

## 2021-11-22 PROCEDURE — 99214 OFFICE O/P EST MOD 30 MIN: CPT | Performed by: NURSE PRACTITIONER

## 2021-11-22 PROCEDURE — 20610 DRAIN/INJ JOINT/BURSA W/O US: CPT | Performed by: NURSE PRACTITIONER

## 2021-11-22 PROCEDURE — 99214 OFFICE O/P EST MOD 30 MIN: CPT

## 2021-11-22 RX ORDER — PREDNISONE 20 MG/1
TABLET ORAL
COMMUNITY
Start: 2021-11-19

## 2021-11-22 RX ORDER — DOXYCYCLINE HYCLATE 100 MG/1
100 CAPSULE ORAL 2 TIMES DAILY
COMMUNITY
Start: 2021-11-19 | End: 2021-11-26

## 2021-11-22 RX ADMIN — Medication 48 MG: at 10:04

## 2021-12-01 DIAGNOSIS — M54.42 CHRONIC BILATERAL LOW BACK PAIN WITH LEFT-SIDED SCIATICA: ICD-10-CM

## 2021-12-01 DIAGNOSIS — G89.29 CHRONIC BILATERAL LOW BACK PAIN WITH LEFT-SIDED SCIATICA: ICD-10-CM

## 2021-12-01 DIAGNOSIS — M17.12 PRIMARY OSTEOARTHRITIS OF LEFT KNEE: ICD-10-CM

## 2021-12-01 DIAGNOSIS — G89.29 CHRONIC CERVICAL PAIN: ICD-10-CM

## 2021-12-01 DIAGNOSIS — M54.2 CHRONIC CERVICAL PAIN: ICD-10-CM

## 2021-12-01 NOTE — TELEPHONE ENCOUNTER
Last Appt:  11/22/2021  Next Appt:   1/4/2022  [facility] [place]:  oxycontin  30 mg    11/4/21   #60  . Due 12/4/21  . [place]:  norco 10/325 mg  11/12/21   #120  . Due 12/12/21  .

## 2021-12-02 RX ORDER — HYDROCODONE BITARTRATE AND ACETAMINOPHEN 10; 325 MG/1; MG/1
1 TABLET ORAL EVERY 6 HOURS PRN
Qty: 120 TABLET | Refills: 0 | Status: SHIPPED | OUTPATIENT
Start: 2021-12-12 | End: 2022-01-04 | Stop reason: SDUPTHER

## 2021-12-02 RX ORDER — OXYCODONE HYDROCHLORIDE 30 MG/1
30 TABLET, FILM COATED, EXTENDED RELEASE ORAL EVERY 12 HOURS
Qty: 60 EACH | Refills: 0 | Status: SHIPPED | OUTPATIENT
Start: 2021-12-04 | End: 2022-01-04 | Stop reason: SDUPTHER

## 2021-12-06 NOTE — TELEPHONE ENCOUNTER
Patient called stating that insurance denied his oxycontin. Pt does not want to go without it or anything and is wondering if Cindy Hull can send in a substitute until insurance can approve the oxycontin.

## 2021-12-07 RX ORDER — MORPHINE SULFATE 15 MG/1
15 TABLET, FILM COATED, EXTENDED RELEASE ORAL 2 TIMES DAILY
Qty: 60 TABLET | Refills: 0 | Status: SHIPPED | OUTPATIENT
Start: 2021-12-07 | End: 2022-01-06

## 2022-01-04 ENCOUNTER — OFFICE VISIT (OUTPATIENT)
Dept: PAIN MANAGEMENT | Age: 67
End: 2022-01-04
Payer: MEDICARE

## 2022-01-04 ENCOUNTER — HOSPITAL ENCOUNTER (OUTPATIENT)
Age: 67
Setting detail: SPECIMEN
Discharge: HOME | End: 2022-01-04
Payer: MEDICARE

## 2022-01-04 VITALS
SYSTOLIC BLOOD PRESSURE: 112 MMHG | WEIGHT: 262 LBS | DIASTOLIC BLOOD PRESSURE: 72 MMHG | BODY MASS INDEX: 35.49 KG/M2 | HEIGHT: 72 IN

## 2022-01-04 DIAGNOSIS — M54.42 CHRONIC BILATERAL LOW BACK PAIN WITH LEFT-SIDED SCIATICA: ICD-10-CM

## 2022-01-04 DIAGNOSIS — Z79.891 ENCOUNTER FOR LONG-TERM OPIATE ANALGESIC USE: Primary | ICD-10-CM

## 2022-01-04 DIAGNOSIS — G89.29 CHRONIC BILATERAL LOW BACK PAIN WITH LEFT-SIDED SCIATICA: ICD-10-CM

## 2022-01-04 DIAGNOSIS — Z02.83 ENCOUNTER FOR DRUG SCREENING: ICD-10-CM

## 2022-01-04 DIAGNOSIS — Z79.891 ENCOUNTER FOR LONG-TERM OPIATE ANALGESIC USE: ICD-10-CM

## 2022-01-04 DIAGNOSIS — M17.12 PRIMARY OSTEOARTHRITIS OF LEFT KNEE: ICD-10-CM

## 2022-01-04 DIAGNOSIS — M54.2 CHRONIC CERVICAL PAIN: ICD-10-CM

## 2022-01-04 DIAGNOSIS — G89.29 CHRONIC CERVICAL PAIN: ICD-10-CM

## 2022-01-04 PROCEDURE — 80307 DRUG TEST PRSMV CHEM ANLYZR: CPT

## 2022-01-04 PROCEDURE — 99214 OFFICE O/P EST MOD 30 MIN: CPT

## 2022-01-04 PROCEDURE — 99214 OFFICE O/P EST MOD 30 MIN: CPT | Performed by: NURSE PRACTITIONER

## 2022-01-04 RX ORDER — OXYCODONE HYDROCHLORIDE 30 MG/1
30 TABLET, FILM COATED, EXTENDED RELEASE ORAL EVERY 12 HOURS
Qty: 60 EACH | Refills: 0 | Status: SHIPPED | OUTPATIENT
Start: 2022-01-04 | End: 2022-01-31 | Stop reason: SDUPTHER

## 2022-01-04 RX ORDER — HYDROCODONE BITARTRATE AND ACETAMINOPHEN 10; 325 MG/1; MG/1
1 TABLET ORAL EVERY 6 HOURS PRN
Qty: 120 TABLET | Refills: 0 | Status: SHIPPED | OUTPATIENT
Start: 2022-01-11 | End: 2022-01-31 | Stop reason: SDUPTHER

## 2022-01-04 ASSESSMENT — ENCOUNTER SYMPTOMS
CONSTIPATION: 0
EYES NEGATIVE: 1
SHORTNESS OF BREATH: 0
BACK PAIN: 1

## 2022-01-04 NOTE — PROGRESS NOTES
Subjective:      Patient ID: Sin Arguello is a 73 y.o. male. Chief Complaint   Patient presents with    Follow-up     2 mo f/u     Knee Pain   Associated symptoms include numbness. Ankle Pain   Associated symptoms include numbness. Back Pain  Associated symptoms include numbness and weakness. Pertinent negatives include no chest pain. Other  Associated symptoms include arthralgias, fatigue, myalgias, neck pain, numbness and weakness. Pertinent negatives include no chest pain. Here today for routine pain clinic recheck, medication management    Would like to go back to oxycontin. Angelo Baueret wears off too quickly, Failed morphine, caused severe abdominal cramping    ADVERSE MEDICATION EFFECTS:   Nausea and vomiting: no   Constipation: no-Undercontrol-: no  Dizziness/drowsy/sleepy--not applicable  Urinary Retention: no    ACTIVITY/SOCIAL/EMOTIONAL:  Sleep Pattern: 6 hours per night. generally restful sleep  Home Exercises: daily walking  Activity:unchanged  Emotional Issues: normal.   Currently seeing a Psychiatrist or Psychologist:  No      ABERRANT BEHAVIORS SINCE LAST VISIT  Lost rx/pills:------------------------------------------ no  Taking  medication as prescribed: ----------- no  Urine Drug Screen ---------------------------------  yes  Recent ER visits: -------------------------------------No  Pill count is appropriate: ---------------------------not applicable   Refills for prescriptions appropriate:---------- yes      Pain Assessment  Location of Pain:  (knee, back)  Location Modifiers:  (back both sides - tailbone)  Severity of Pain: 9  Quality of Pain: Throbbing,Sharp,Dull,Aching,Locking,Grinding,Popping,Cracking,Buckling  Duration of Pain: Persistent  Frequency of Pain: Constant  Date Pain First Started:  (shortly after taking morphine)  Aggravating Factors: Bending,Stretching,Exercise,Straightening,Kneeling,Squatting,Walking,Standing,Stairs  Limiting Behavior: Yes  Relieving Factors:  Other (Comment) (pain medications)  Result of Injury: No  Work-Related Injury: No    Allergies   Allergen Reactions    Penicillins Shortness Of Breath    Adhesive Tape Rash     Cannot use any medicine patch    Mucinex [Guaifenesin Er] Diarrhea and Nausea And Vomiting    Cephalexin Hives    Chantix [Varenicline Tartrate] Other (See Comments)     Worsening depression    Gabapentin Other (See Comments)     Severe depression       Outpatient Medications Marked as Taking for the 1/4/22 encounter (Office Visit) with SAHARA Ruiz CNP   Medication Sig Dispense Refill    [START ON 1/11/2022] HYDROcodone-acetaminophen (NORCO)  MG per tablet Take 1 tablet by mouth every 6 hours as needed for Pain for up to 30 days. 120 tablet 0    oxyCODONE (OXYCONTIN) 30 MG T12A extended release tablet Take 30 mg by mouth every 12 hours for 30 days.  61 each 0       Past Medical History:   Diagnosis Date    Alpha-1-antitrypsin deficiency ([address]     Backache, unspecified     Bladder cancer ([address]     Chronic pain of left knee     had supartz x 2 with no relief from [address] COPD (chronic obstructive pulmonary disease) ([address]     Depression     Eye problem 06/2019    bleeding and swelling behind Rt eye    Hyperlipidemia     Hypothyroidism     Other pulmonary embolism and infarction     Tobacco use disorder     WPW syndrome        Past Surgical History:   Procedure Laterality Date    BLADDER SURGERY  october    CARDIAC CATHETERIZATION  2013    CORONARY ANGIOPLASTY WITH STENT PLACEMENT  08/31/2021    [facility] 1 stent    FINGER SURGERY Right 2006    index finger flexor tendon repair    FINGER SURGERY Left 2014    KNEE ARTHROSCOPY Left 2012    LUNG BIOPSY Left 2/4/2015    benign results    OTHER SURGICAL HISTORY  7/9/2014    L4/L5    SINUS SURGERY  11/04/2017    Dr. Edy Orlando TISSUE BIOPSY  2014, 2010    THROAT SURGERY  5/3/16    Abdominal fat used to repair vocal cords    TONSILLECTOMY Family History   Problem Relation Age of Onset    Cancer Father         lung    Heart Disease Mother        Social History     Socioeconomic History    Marital status:      Spouse name: None    Number of children: None    Years of education: None    Highest education level: None   Occupational History    None   Tobacco Use    Smoking status: Current Every Day Smoker     Packs/day: 0.50     Years: 46.00     Pack years: 23.00     Types: Cigarettes     Start date: 1/1/1971    Smokeless tobacco: Former User     Quit date: 9/1/2021    Tobacco comment: Pt down to 1/2 ppd from 3 ppd, attempting to quit. Vaping Use    Vaping Use: Never used   Substance and Sexual Activity    Alcohol use: No    Drug use: No    Sexual activity: None   Other Topics Concern    None   Social History Narrative    None     Social Determinants of Health     Financial Resource Strain:     Difficulty of Paying Living Expenses: Not on file   Food Insecurity:     Worried About Running Out of Food in the Last Year: Not on file    Ephraim of Food in the Last Year: Not on file   Transportation Needs:     Lack of Transportation (Medical): Not on file    Lack of Transportation (Non-Medical):  Not on file   Physical Activity:     Days of Exercise per Week: Not on file    Minutes of Exercise per Session: Not on file   Stress:     Feeling of Stress : Not on file   Social Connections:     Frequency of Communication with Friends and Family: Not on file    Frequency of Social Gatherings with Friends and Family: Not on file    Attends Zoroastrianism Services: Not on file    Active Member of Clubs or Organizations: Not on file    Attends Club or Organization Meetings: Not on file    Marital Status: Not on file   Intimate Partner Violence:     Fear of Current or Ex-Partner: Not on file    Emotionally Abused: Not on file    Physically Abused: Not on file    Sexually Abused: Not on file   Housing Stability:     Unable to Pay for Housing in the Last Year: Not on file    Number of Places Lived in the Last Year: Not on file    Unstable Housing in the Last Year: Not on file     Review of Systems   Constitutional: Positive for fatigue. HENT: Negative. Eyes: Negative. Respiratory: Negative for shortness of breath. Cardiovascular: Negative for chest pain. Recent cardiac stent placement   Gastrointestinal: Negative for constipation. Musculoskeletal: Positive for arthralgias, back pain, myalgias and neck pain. Skin: Negative. Allergic/Immunologic: Negative for environmental allergies and food allergies. Neurological: Positive for weakness and numbness. Psychiatric/Behavioral: Positive for sleep disturbance. Objective:   Physical Exam  Constitutional:       General: He is not in acute distress. Appearance: He is well-developed. He is not ill-appearing, toxic-appearing or diaphoretic. HENT:      Head: Normocephalic and atraumatic. Cardiovascular:      Rate and Rhythm: Normal rate. Pulses: Normal pulses. Comments: Warm extremities. Normal capillary refill. Pulmonary:      Effort: Pulmonary effort is normal.   Skin:     General: Skin is warm and dry. Coloration: Skin is not pale. Neurological:      Mental Status: He is alert and oriented to person, place, and time. Cranial Nerves: No cranial nerve deficit. Sensory: No sensory deficit. Motor: No atrophy or abnormal muscle tone. Deep Tendon Reflexes: Reflexes are normal and symmetric. Psychiatric:         Mood and Affect: Affect is not angry. Speech: Speech normal.         Behavior: Behavior normal. Behavior is not agitated, aggressive or withdrawn. Behavior is cooperative. Judgment: Judgment normal. Judgment is not impulsive or inappropriate. Assessment:      1. Chronic bilateral low back pain with left-sided sciatica    2. Chronic cervical pain    3.  Primary osteoarthritis of left knee Plan:     Will send in Rx for oxycontin at previous dose    SOAPP- the score is 4 (Values indicates patient is <4minimal potential 4-7 Moderate potential >7 High potential for drug addiction)    Controlled Substances Monitoring:     RX Monitoring 1/4/2022   Attestation -   Periodic Controlled Substance Monitoring No signs of potential drug abuse or diversion identified.    Chronic Pain > 50 MEDD -   Chronic Pain > 80 MEDD -   Chronic Pain > 120 MEDD (historical values) -   Chronic Pain > 120 MEDD -           Follow up two months

## 2022-01-08 LAB
6-ACETYLMORPHINE, UR: NOT DETECTED
7-AMINOCLONAZEPAM, URINE: NOT DETECTED
ALPHA-OH-ALPRAZ, URINE: NOT DETECTED
ALPHA-OH-MIDAZOLAM, URINE: NOT DETECTED
ALPRAZOLAM, URINE: NOT DETECTED
AMPHETAMINES, URINE: NOT DETECTED
BARBITURATES, URINE: NOT DETECTED
BENZOYLECGONINE, UR: NOT DETECTED
BUPRENORPHINE URINE: NOT DETECTED
CARISOPRODOL, UR: NOT DETECTED
CLONAZEPAM, URINE: NOT DETECTED
CODEINE, URINE: NOT DETECTED
CREATININE URINE: 129.3 MG/DL (ref 20–400)
DIAZEPAM, URINE: NOT DETECTED
EER PAIN MGT DRUG PANEL, HIGH RES/EMIT U: NORMAL
ETHYL GLUCURONIDE UR: NOT DETECTED
FENTANYL URINE: NOT DETECTED
GABAPENTIN: NOT DETECTED
HYDROCODONE, URINE: PRESENT
HYDROMORPHONE, URINE: PRESENT
LORAZEPAM, URINE: NOT DETECTED
MARIJUANA METAB, UR: NOT DETECTED
MDA, UR: NOT DETECTED
MDEA, EVE, UR: NOT DETECTED
MDMA URINE: NOT DETECTED
MEPERIDINE METAB, UR: NOT DETECTED
METHADONE, URINE: NOT DETECTED
METHAMPHETAMINE, URINE: NOT DETECTED
METHYLPHENIDATE: NOT DETECTED
MIDAZOLAM, URINE: NOT DETECTED
MORPHINE URINE: NOT DETECTED
NALOXONE URINE: NOT DETECTED
NORBUPRENORPHINE, URINE: NOT DETECTED
NORDIAZEPAM, URINE: NOT DETECTED
NORFENTANYL, URINE: NOT DETECTED
NORHYDROCODONE, URINE: PRESENT
NOROXYCODONE, URINE: NOT DETECTED
NOROXYMORPHONE, URINE: NOT DETECTED
OXAZEPAM, URINE: NOT DETECTED
OXYCODONE URINE: NOT DETECTED
OXYMORPHONE, URINE: NOT DETECTED
PAIN MGT DRUG PANEL, HI RES, UR: NORMAL
PCP,URINE: NOT DETECTED
PHENTERMINE, UR: NOT DETECTED
PREGABALIN: NOT DETECTED
TAPENTADOL, URINE: NOT DETECTED
TAPENTADOL-O-SULFATE, URINE: NOT DETECTED
TEMAZEPAM, URINE: NOT DETECTED
TRAMADOL, URINE: NOT DETECTED
ZOLPIDEM METABOLITE (ZCA), URINE: NOT DETECTED
ZOLPIDEM, URINE: NOT DETECTED

## 2022-01-31 DIAGNOSIS — G89.29 CHRONIC BILATERAL LOW BACK PAIN WITH LEFT-SIDED SCIATICA: ICD-10-CM

## 2022-01-31 DIAGNOSIS — G89.29 CHRONIC CERVICAL PAIN: ICD-10-CM

## 2022-01-31 DIAGNOSIS — M17.12 PRIMARY OSTEOARTHRITIS OF LEFT KNEE: ICD-10-CM

## 2022-01-31 DIAGNOSIS — M54.42 CHRONIC BILATERAL LOW BACK PAIN WITH LEFT-SIDED SCIATICA: ICD-10-CM

## 2022-01-31 DIAGNOSIS — M54.2 CHRONIC CERVICAL PAIN: ICD-10-CM

## 2022-01-31 RX ORDER — OXYCODONE HYDROCHLORIDE 30 MG/1
30 TABLET, FILM COATED, EXTENDED RELEASE ORAL EVERY 12 HOURS
Qty: 60 EACH | Refills: 0 | Status: SHIPPED | OUTPATIENT
Start: 2022-02-03 | End: 2022-03-08 | Stop reason: SDUPTHER

## 2022-01-31 RX ORDER — HYDROCODONE BITARTRATE AND ACETAMINOPHEN 10; 325 MG/1; MG/1
1 TABLET ORAL EVERY 6 HOURS PRN
Qty: 120 TABLET | Refills: 0 | Status: SHIPPED | OUTPATIENT
Start: 2022-02-10 | End: 2022-03-08 | Stop reason: SDUPTHER

## 2022-01-31 NOTE — TELEPHONE ENCOUNTER
[place]: 1/4/22 oxycodone ER 30mg #60. Due 2/3/22.          1/11/22 norco 10-325mg #60. Due 2/10/22.     Last Appt:  1/4/2022  Next Appt:   3/11/2022  Med verified in Epic    Review UDS from last visit

## 2022-02-04 ENCOUNTER — TELEPHONE (OUTPATIENT)
Dept: PAIN MANAGEMENT | Age: 67
End: 2022-02-04

## 2022-02-12 NOTE — TELEPHONE ENCOUNTER
Pt is agreeable to morphine tabs. No patches (added to allergy list).     Send RA-N. 13-Feb-2022 00:47

## 2022-03-08 DIAGNOSIS — G89.29 CHRONIC CERVICAL PAIN: ICD-10-CM

## 2022-03-08 DIAGNOSIS — M17.12 PRIMARY OSTEOARTHRITIS OF LEFT KNEE: ICD-10-CM

## 2022-03-08 DIAGNOSIS — G89.29 CHRONIC BILATERAL LOW BACK PAIN WITH LEFT-SIDED SCIATICA: ICD-10-CM

## 2022-03-08 DIAGNOSIS — M54.2 CHRONIC CERVICAL PAIN: ICD-10-CM

## 2022-03-08 DIAGNOSIS — M54.42 CHRONIC BILATERAL LOW BACK PAIN WITH LEFT-SIDED SCIATICA: ICD-10-CM

## 2022-03-08 RX ORDER — HYDROCODONE BITARTRATE AND ACETAMINOPHEN 10; 325 MG/1; MG/1
1 TABLET ORAL EVERY 6 HOURS PRN
Qty: 120 TABLET | Refills: 0 | Status: SHIPPED | OUTPATIENT
Start: 2022-03-11 | End: 2022-04-10

## 2022-03-08 RX ORDER — OXYCODONE HYDROCHLORIDE 30 MG/1
30 TABLET, FILM COATED, EXTENDED RELEASE ORAL EVERY 12 HOURS
Qty: 60 EACH | Refills: 0 | Status: SHIPPED | OUTPATIENT
Start: 2022-03-08 | End: 2022-04-07

## 2022-03-08 NOTE — TELEPHONE ENCOUNTER
Alma Delia Worthy called requesting a refill of the below medication which has been pended for you:     Requested Prescriptions     Pending Prescriptions Disp Refills    HYDROcodone-acetaminophen (NORCO)  MG per tablet 120 tablet 0     Sig: Take 1 tablet by mouth every 6 hours as needed for Pain for up to 30 days.  oxyCODONE (OXYCONTIN) 30 MG T12A extended release tablet 60 each 0     Sig: Take 30 mg by mouth every 12 hours for 30 days. Last Appointment Date: 1/4/2022  Next Appointment Date: 3/11/2022    Allergies   Allergen Reactions    Penicillins Shortness Of Breath    Adhesive Tape Rash     Cannot use any medicine patch    Mucinex [Guaifenesin Er] Diarrhea and Nausea And Vomiting    Cephalexin Hives    Chantix [Varenicline Tartrate] Other (See Comments)     Worsening depression    Gabapentin Other (See Comments)     Severe depression       Last DS11 on 1/4/22. please review. [place] for [place]:  norco 10/325 mg 2/10/22   #120  . Due 3/12/22. [place] checked for [place]:  oxycontin 30 mg 2/4/22   #60  . Due now. norco pinned one day early due to pharmacy being closed on sat & sun.

## 2023-08-25 NOTE — TELEPHONE ENCOUNTER
Morphine sent Spoke with patient who is agreeable to increasing potassium to 40 MEQ x 2 days and starting magnesium oxide 400 MG daily. BMP & magnesium ordered and schedule for redraw on 9/1/23. Patient has no further questions at this time.